# Patient Record
Sex: FEMALE | Race: WHITE | Employment: FULL TIME | URBAN - NONMETROPOLITAN AREA
[De-identification: names, ages, dates, MRNs, and addresses within clinical notes are randomized per-mention and may not be internally consistent; named-entity substitution may affect disease eponyms.]

---

## 2017-01-12 ENCOUNTER — OFFICE VISIT (OUTPATIENT)
Dept: FAMILY MEDICINE CLINIC | Facility: CLINIC | Age: 47
End: 2017-01-12

## 2017-01-12 VITALS
OXYGEN SATURATION: 96 % | HEART RATE: 66 BPM | TEMPERATURE: 98 F | SYSTOLIC BLOOD PRESSURE: 130 MMHG | WEIGHT: 269.38 LBS | BODY MASS INDEX: 46 KG/M2 | DIASTOLIC BLOOD PRESSURE: 88 MMHG

## 2017-01-12 DIAGNOSIS — M19.041 PRIMARY OSTEOARTHRITIS OF BOTH HANDS: Primary | ICD-10-CM

## 2017-01-12 DIAGNOSIS — M19.042 PRIMARY OSTEOARTHRITIS OF BOTH HANDS: Primary | ICD-10-CM

## 2017-01-12 PROCEDURE — 99213 OFFICE O/P EST LOW 20 MIN: CPT | Performed by: FAMILY MEDICINE

## 2017-01-12 RX ORDER — MELOXICAM 15 MG/1
15 TABLET ORAL DAILY
Qty: 30 TABLET | Refills: 0 | Status: SHIPPED | OUTPATIENT
Start: 2017-01-12 | End: 2017-02-15 | Stop reason: ALTCHOICE

## 2017-01-16 ENCOUNTER — TELEPHONE (OUTPATIENT)
Dept: FAMILY MEDICINE CLINIC | Facility: CLINIC | Age: 47
End: 2017-01-16

## 2017-02-07 ENCOUNTER — MED REC SCAN ONLY (OUTPATIENT)
Dept: FAMILY MEDICINE CLINIC | Facility: CLINIC | Age: 47
End: 2017-02-07

## 2017-02-15 ENCOUNTER — OFFICE VISIT (OUTPATIENT)
Dept: FAMILY MEDICINE CLINIC | Facility: CLINIC | Age: 47
End: 2017-02-15

## 2017-02-15 VITALS
TEMPERATURE: 98 F | WEIGHT: 272.5 LBS | DIASTOLIC BLOOD PRESSURE: 88 MMHG | OXYGEN SATURATION: 96 % | BODY MASS INDEX: 46.52 KG/M2 | HEIGHT: 64 IN | SYSTOLIC BLOOD PRESSURE: 130 MMHG | HEART RATE: 86 BPM

## 2017-02-15 DIAGNOSIS — M19.042 PRIMARY OSTEOARTHRITIS OF BOTH HANDS: ICD-10-CM

## 2017-02-15 DIAGNOSIS — M77.31 HEEL SPUR, RIGHT: Primary | ICD-10-CM

## 2017-02-15 DIAGNOSIS — M19.041 PRIMARY OSTEOARTHRITIS OF BOTH HANDS: ICD-10-CM

## 2017-02-15 PROCEDURE — 99213 OFFICE O/P EST LOW 20 MIN: CPT | Performed by: FAMILY MEDICINE

## 2017-02-15 RX ORDER — MELOXICAM 15 MG/1
15 TABLET ORAL DAILY PRN
COMMUNITY
Start: 2017-01-12 | End: 2017-02-16

## 2017-02-15 NOTE — PROGRESS NOTES
Kei Florentino is a 55year old female. Patient presents with: Other: RT foot heel pain-started a few wks ago. ..happened a couple days after little foot injury, getting worse. .... also discuss RT thumb  pain-felt better after OT, but it is hurting again. Neha Patel Mother    • OSTEOPEROSIS [Other] Neha Patel Mother    • ALS [Other] [OTHER] Mother    • CAD [Other] [OTHER] Father    • COPD [Other] [OTHER] Father    • BLADDER CANCER [Other] Neha Patel Father         Social History:    Smoking Status: Never Smoker

## 2017-02-16 RX ORDER — MELOXICAM 15 MG/1
15 TABLET ORAL DAILY PRN
Qty: 30 TABLET | Refills: 0 | Status: SHIPPED | OUTPATIENT
Start: 2017-02-16 | End: 2017-02-17

## 2017-02-17 RX ORDER — MELOXICAM 15 MG/1
15 TABLET ORAL DAILY PRN
Qty: 30 TABLET | Refills: 0 | Status: SHIPPED | OUTPATIENT
Start: 2017-02-17 | End: 2019-04-29

## 2017-03-09 ENCOUNTER — TELEPHONE (OUTPATIENT)
Dept: FAMILY MEDICINE CLINIC | Facility: CLINIC | Age: 47
End: 2017-03-09

## 2017-03-09 NOTE — TELEPHONE ENCOUNTER
Pt states that she was referred to pt/fina dn was seen on 1/13/2017 and 1/27/2017. Pt states she received a letter from physical therapy stating the visits were not covered and pt needs an authorization.  Advised pt if she could bring in a copy of the letter

## 2017-03-10 NOTE — TELEPHONE ENCOUNTER
[facility] Floor was dropped off, but per note that was attached BC/BC is waiting for records from [address] [place] [address] [address] - they have not denied

## 2017-04-04 ENCOUNTER — OFFICE VISIT (OUTPATIENT)
Dept: FAMILY MEDICINE CLINIC | Facility: CLINIC | Age: 47
End: 2017-04-04

## 2017-04-04 VITALS
HEIGHT: 64 IN | SYSTOLIC BLOOD PRESSURE: 136 MMHG | DIASTOLIC BLOOD PRESSURE: 88 MMHG | WEIGHT: 275 LBS | TEMPERATURE: 99 F | OXYGEN SATURATION: 96 % | HEART RATE: 80 BPM | BODY MASS INDEX: 46.95 KG/M2

## 2017-04-04 DIAGNOSIS — M72.2 PLANTAR FASCIITIS, RIGHT: Primary | ICD-10-CM

## 2017-04-04 PROCEDURE — 99213 OFFICE O/P EST LOW 20 MIN: CPT | Performed by: FAMILY MEDICINE

## 2017-04-04 NOTE — PROGRESS NOTES
Sly Duke is a 55year old female. Patient presents with: Other: fup on RT heal spur. ..room 1      HPI:   Patient has had heel pain on the right side for several months. She has been using exercises, thick soled shoes and getting some relief.   Prasanna Tobin Smokeless Status: Never Used                        Alcohol Use: Yes           0.0 oz/week       0 Standard drinks or equivalent per week       Comment: OCCASIONAL       REVIEW OF SYSTEMS:   GENERAL HEALTH: feels well otherwise  SKIN: leobardo

## 2017-08-03 ENCOUNTER — TELEPHONE (OUTPATIENT)
Dept: FAMILY MEDICINE CLINIC | Facility: CLINIC | Age: 47
End: 2017-08-03

## 2017-08-03 ENCOUNTER — LAB ENCOUNTER (OUTPATIENT)
Dept: LAB | Age: 47
End: 2017-08-03
Attending: FAMILY MEDICINE
Payer: COMMERCIAL

## 2017-08-03 ENCOUNTER — OFFICE VISIT (OUTPATIENT)
Dept: FAMILY MEDICINE CLINIC | Facility: CLINIC | Age: 47
End: 2017-08-03

## 2017-08-03 VITALS
HEART RATE: 86 BPM | HEIGHT: 64.75 IN | TEMPERATURE: 98 F | DIASTOLIC BLOOD PRESSURE: 106 MMHG | SYSTOLIC BLOOD PRESSURE: 154 MMHG | OXYGEN SATURATION: 98 % | BODY MASS INDEX: 47.64 KG/M2 | WEIGHT: 282.5 LBS

## 2017-08-03 DIAGNOSIS — I10 ESSENTIAL HYPERTENSION: ICD-10-CM

## 2017-08-03 DIAGNOSIS — M79.673 HEEL PAIN, UNSPECIFIED LATERALITY: ICD-10-CM

## 2017-08-03 DIAGNOSIS — E55.9 VITAMIN D DEFICIENCY: ICD-10-CM

## 2017-08-03 DIAGNOSIS — I10 ESSENTIAL HYPERTENSION: Primary | ICD-10-CM

## 2017-08-03 LAB
25-HYDROXYVITAMIN D (TOTAL): 20.6 NG/ML (ref 30–100)
ALBUMIN SERPL-MCNC: 3.8 G/DL (ref 3.5–4.8)
ALP LIVER SERPL-CCNC: 61 U/L (ref 39–100)
ALT SERPL-CCNC: 50 U/L (ref 14–54)
AST SERPL-CCNC: 44 U/L (ref 15–41)
BILIRUB SERPL-MCNC: 0.6 MG/DL (ref 0.1–2)
BUN BLD-MCNC: 8 MG/DL (ref 8–20)
CALCIUM BLD-MCNC: 9.2 MG/DL (ref 8.3–10.3)
CHLORIDE: 104 MMOL/L (ref 101–111)
CHOLEST SMN-MCNC: 165 MG/DL (ref ?–200)
CO2: 28 MMOL/L (ref 22–32)
CREAT BLD-MCNC: 0.78 MG/DL (ref 0.55–1.02)
GLUCOSE BLD-MCNC: 95 MG/DL (ref 70–99)
HDLC SERPL-MCNC: 32 MG/DL (ref 45–?)
HDLC SERPL: 5.16 {RATIO} (ref ?–4.44)
LDLC SERPL CALC-MCNC: 74 MG/DL (ref ?–130)
LDLC SERPL-MCNC: 59 MG/DL (ref 5–40)
M PROTEIN MFR SERPL ELPH: 7.6 G/DL (ref 6.1–8.3)
NONHDLC SERPL-MCNC: 133 MG/DL (ref ?–130)
POTASSIUM SERPL-SCNC: 4.1 MMOL/L (ref 3.6–5.1)
SODIUM SERPL-SCNC: 139 MMOL/L (ref 136–144)
TRIGLYCERIDES: 294 MG/DL (ref ?–150)

## 2017-08-03 PROCEDURE — 82306 VITAMIN D 25 HYDROXY: CPT | Performed by: FAMILY MEDICINE

## 2017-08-03 PROCEDURE — 80053 COMPREHEN METABOLIC PANEL: CPT | Performed by: FAMILY MEDICINE

## 2017-08-03 PROCEDURE — 80061 LIPID PANEL: CPT | Performed by: FAMILY MEDICINE

## 2017-08-03 PROCEDURE — 36415 COLL VENOUS BLD VENIPUNCTURE: CPT | Performed by: FAMILY MEDICINE

## 2017-08-03 PROCEDURE — 99214 OFFICE O/P EST MOD 30 MIN: CPT | Performed by: FAMILY MEDICINE

## 2017-08-03 RX ORDER — LISINOPRIL AND HYDROCHLOROTHIAZIDE 12.5; 1 MG/1; MG/1
1 TABLET ORAL DAILY
Qty: 90 TABLET | Refills: 3 | Status: SHIPPED | OUTPATIENT
Start: 2017-08-03 | End: 2018-07-29

## 2017-08-03 RX ORDER — LISINOPRIL AND HYDROCHLOROTHIAZIDE 12.5; 1 MG/1; MG/1
1 TABLET ORAL DAILY
Qty: 90 TABLET | Refills: 3 | Status: SHIPPED | OUTPATIENT
Start: 2017-08-03 | End: 2017-08-03

## 2017-08-03 NOTE — PROGRESS NOTES
Goran Mercado is a 55year old female. Patient presents with: Other: left foot pain inrm 3      HPI:   Patient has pain to the inside aspect of her left heel. No injury. No redness. There is a firm lump there. No new shoes.   She also has elevated b History:  Smoking status: Never Smoker                                                              Smokeless tobacco: Never Used                      Alcohol use: Yes           0.0 oz/week     Comment: OCCASIONAL       REVIEW OF SYSTEMS:   GENERAL HEALTH: Metabolic Panel (14) [E]      *Venipuncture    Meds & Refills for this Visit:  Signed Prescriptions Disp Refills    Lisinopril-Hydrochlorothiazide 10-12.5 MG Oral Tab 90 tablet 3      Sig: Take 1 tablet by mouth daily.            Imaging & Consults:  PODIAT

## 2017-08-04 NOTE — PROGRESS NOTES
Notify cholesterol and chemistry profile are normal.  Recheck in 1 year. Vitamin D remains low. Recommend she take 2000 units of vitamin D twice a day. Recheck level again in 6 months.

## 2017-09-06 ENCOUNTER — TELEPHONE (OUTPATIENT)
Dept: FAMILY MEDICINE CLINIC | Facility: CLINIC | Age: 47
End: 2017-09-06

## 2017-09-06 NOTE — TELEPHONE ENCOUNTER
Pt needs to have BP checked for her new medication - does she need to see doctor again or just have a nurse appt to have BP checked?

## 2017-09-06 NOTE — TELEPHONE ENCOUNTER
I talked to the patient and advised that she just needs a RN appt  Her work schedule this week will not work     She advised that she just took her last pill  She really doesn't think that she needs it.  Her BP readings have been really good   She was stres

## 2017-09-18 ENCOUNTER — TELEPHONE (OUTPATIENT)
Dept: FAMILY MEDICINE CLINIC | Facility: CLINIC | Age: 47
End: 2017-09-18

## 2017-09-18 ENCOUNTER — NURSE ONLY (OUTPATIENT)
Dept: FAMILY MEDICINE CLINIC | Facility: CLINIC | Age: 47
End: 2017-09-18

## 2017-09-18 VITALS — SYSTOLIC BLOOD PRESSURE: 134 MMHG | DIASTOLIC BLOOD PRESSURE: 86 MMHG

## 2017-09-18 NOTE — TELEPHONE ENCOUNTER
Patient came in for BP reading   1st reading 140/100  2nd reading was 134/86    Patient has been off of the BP medicine (Lisinopril-hctz 10-12.5) for 1.5 weeks  At home she is getting around 129/78    Please advise

## 2017-09-18 NOTE — TELEPHONE ENCOUNTER
Keep monitoring blood pressures at home. As long as it staying below 140/90, no need to resume medication. Recheck blood pressure here again in 1 month.

## 2018-02-19 ENCOUNTER — LABORATORY ENCOUNTER (OUTPATIENT)
Dept: LAB | Age: 48
End: 2018-02-19
Attending: FAMILY MEDICINE
Payer: COMMERCIAL

## 2018-02-19 DIAGNOSIS — E55.9 VITAMIN D DEFICIENCY: ICD-10-CM

## 2018-02-19 LAB — 25-HYDROXYVITAMIN D (TOTAL): 37 NG/ML (ref 30–100)

## 2018-02-19 PROCEDURE — 36415 COLL VENOUS BLD VENIPUNCTURE: CPT | Performed by: FAMILY MEDICINE

## 2018-02-19 PROCEDURE — 82306 VITAMIN D 25 HYDROXY: CPT | Performed by: FAMILY MEDICINE

## 2018-07-06 RX ORDER — BUTALBITAL, ACETAMINOPHEN AND CAFFEINE 50; 325; 40 MG/1; MG/1; MG/1
1 CAPSULE ORAL EVERY 6 HOURS PRN
Qty: 30 CAPSULE | Refills: 0 | Status: SHIPPED | OUTPATIENT
Start: 2018-07-06 | End: 2019-05-29 | Stop reason: ALTCHOICE

## 2018-08-08 ENCOUNTER — OFFICE VISIT (OUTPATIENT)
Dept: FAMILY MEDICINE CLINIC | Facility: CLINIC | Age: 48
End: 2018-08-08
Payer: COMMERCIAL

## 2018-08-08 ENCOUNTER — TELEPHONE (OUTPATIENT)
Dept: FAMILY MEDICINE CLINIC | Facility: CLINIC | Age: 48
End: 2018-08-08

## 2018-08-08 ENCOUNTER — HOSPITAL ENCOUNTER (OUTPATIENT)
Dept: GENERAL RADIOLOGY | Age: 48
Discharge: HOME | End: 2018-08-08
Attending: FAMILY MEDICINE
Payer: COMMERCIAL

## 2018-08-08 VITALS
HEART RATE: 73 BPM | OXYGEN SATURATION: 97 % | TEMPERATURE: 98 F | SYSTOLIC BLOOD PRESSURE: 124 MMHG | BODY MASS INDEX: 45 KG/M2 | WEIGHT: 266 LBS | DIASTOLIC BLOOD PRESSURE: 86 MMHG

## 2018-08-08 DIAGNOSIS — S66.912A SPRAIN AND STRAIN OF LEFT HAND: Primary | ICD-10-CM

## 2018-08-08 DIAGNOSIS — S69.92XA HAND INJURY, LEFT, INITIAL ENCOUNTER: Primary | ICD-10-CM

## 2018-08-08 DIAGNOSIS — S69.92XA HAND INJURY, LEFT, INITIAL ENCOUNTER: ICD-10-CM

## 2018-08-08 DIAGNOSIS — S63.92XA SPRAIN AND STRAIN OF LEFT HAND: Primary | ICD-10-CM

## 2018-08-08 PROCEDURE — 99213 OFFICE O/P EST LOW 20 MIN: CPT | Performed by: FAMILY MEDICINE

## 2018-08-08 PROCEDURE — 73130 X-RAY EXAM OF HAND: CPT | Performed by: FAMILY MEDICINE

## 2018-08-08 NOTE — TELEPHONE ENCOUNTER
Future Appointments  Date Time Provider Eladia Nunes   8/8/2018 3:00 PM DO SHELLY OdonnellSW EMG [facility]

## 2018-08-08 NOTE — TELEPHONE ENCOUNTER
Pt. Injured her hand and wanted to be seen this afternoon after 2pm.  Hit it hard on a door handle/swollen and cant  things.

## 2018-08-08 NOTE — PROGRESS NOTES
Leticia Arteaga is a 52year old female. Patient presents with: Other: injury to LT hand this am....room 1      HPI:   Patient was worked in. She fell and injured her left hand. Happened this morning.   She has pain and swelling to the metacarpophalange tobacco: Never Used                      Alcohol use: Yes           0.0 oz/week     Comment: OCCASIONAL       REVIEW OF SYSTEMS:   GENERAL HEALTH: feels well otherwise  SKIN: denies any unusual skin lesions or rashes  RESPIRATORY: denies shortness of breat

## 2018-08-28 ENCOUNTER — TELEPHONE (OUTPATIENT)
Dept: FAMILY MEDICINE CLINIC | Facility: CLINIC | Age: 48
End: 2018-08-28

## 2018-08-28 DIAGNOSIS — Z12.31 BREAST CANCER SCREENING BY MAMMOGRAM: Primary | ICD-10-CM

## 2018-08-28 NOTE — TELEPHONE ENCOUNTER
Called the work # and they advised that she is not expected to be in until 12:30pm   I will try the home phone -     Order placed for the mammogram - she would like faxed to [address] [place] [address] [address]

## 2018-10-30 ENCOUNTER — TELEPHONE (OUTPATIENT)
Dept: FAMILY MEDICINE CLINIC | Facility: CLINIC | Age: 48
End: 2018-10-30

## 2018-10-30 NOTE — TELEPHONE ENCOUNTER
HEARING AIDE PIECE BROKE OFF IS IN EAR, IRRITATED. SPOKE TO [address] Ivette Barcenas RN AND SHE SUGGESTED TO NOTIFY PATIENT TO GO TO EMERGENCY ROOM. SHE STATES SHE WILL GO TO [facility]-.

## 2018-11-12 ENCOUNTER — TELEPHONE (OUTPATIENT)
Dept: FAMILY MEDICINE CLINIC | Facility: CLINIC | Age: 48
End: 2018-11-12

## 2018-11-12 NOTE — TELEPHONE ENCOUNTER
Calling the patient to advise that the mammogram was normal and to repeat in 1 year     Left detailed message

## 2019-04-25 ENCOUNTER — TELEPHONE (OUTPATIENT)
Dept: FAMILY MEDICINE CLINIC | Facility: CLINIC | Age: 49
End: 2019-04-25

## 2019-04-25 DIAGNOSIS — E66.01 OBESITY, CLASS III, BMI 40-49.9 (MORBID OBESITY) (HCC): Primary | ICD-10-CM

## 2019-04-25 NOTE — TELEPHONE ENCOUNTER
HAS APPT TOMORROW WITH RAMIRO, CAN SHE GET FASTING LABS DONE, DID  [facility] PUT ORDERS IN? SHE IS ALSO SCHED FOR PX 5/29, IF SHE CAN NOT GET LABS DONE TOMORROW, SHOULD SHE COME IN PRIOR TO PX OR AFTER?

## 2019-04-25 NOTE — TELEPHONE ENCOUNTER
Advised, since her OV isn't until 2:30 tomorrow and there are not any orders, she does not need to fast. Dr. Lakeisha Alarcon out of the office until Monday, he will order the labs and we will call her with the information.

## 2019-04-26 ENCOUNTER — OFFICE VISIT (OUTPATIENT)
Dept: FAMILY MEDICINE CLINIC | Facility: CLINIC | Age: 49
End: 2019-04-26
Payer: COMMERCIAL

## 2019-04-26 VITALS
TEMPERATURE: 98 F | HEIGHT: 64.75 IN | SYSTOLIC BLOOD PRESSURE: 122 MMHG | DIASTOLIC BLOOD PRESSURE: 70 MMHG | BODY MASS INDEX: 40.47 KG/M2 | OXYGEN SATURATION: 96 % | HEART RATE: 84 BPM | WEIGHT: 240 LBS

## 2019-04-26 DIAGNOSIS — M19.041 PRIMARY OSTEOARTHRITIS OF BOTH HANDS: Primary | ICD-10-CM

## 2019-04-26 DIAGNOSIS — M19.042 PRIMARY OSTEOARTHRITIS OF BOTH HANDS: Primary | ICD-10-CM

## 2019-04-26 PROCEDURE — 99213 OFFICE O/P EST LOW 20 MIN: CPT | Performed by: NURSE PRACTITIONER

## 2019-04-26 RX ORDER — PREDNISONE 20 MG/1
20 TABLET ORAL DAILY
Qty: 5 TABLET | Refills: 0 | Status: SHIPPED | OUTPATIENT
Start: 2019-04-26 | End: 2019-05-01

## 2019-04-26 NOTE — PROGRESS NOTES
HPI:   Finger Pain    Pain location: right index finger. This is a new (has chronic osteoarthritis) problem. Episode onset: 10 days ago. There has been no history of extremity trauma. The problem occurs intermittently.  Associated symptoms include joint s • Other (COPD [Other]) Father    • Other (BLADDER CANCER [Other]) Father       Social History    Tobacco Use      Smoking status: Never Smoker      Smokeless tobacco: Never Used    Alcohol use:  Yes      Alcohol/week: 0.0 oz      Comment: OCCASIONAL    Drug

## 2019-04-27 NOTE — TELEPHONE ENCOUNTER
This has not been filled since Feb 2017?    Patient was started on Prednisone 4/26/19 for osteoarthritis of hands (5 days)       In the past she has taken the meloxicam for the her osteoarthritis of both hands

## 2019-04-27 NOTE — TELEPHONE ENCOUNTER
Meloxicam 15 MG Oral Tab   PLEASE SEND REFILL TO [facility] IN [facility]

## 2019-04-29 RX ORDER — MELOXICAM 15 MG/1
15 TABLET ORAL DAILY PRN
Qty: 30 TABLET | Refills: 0 | Status: SHIPPED | OUTPATIENT
Start: 2019-04-29 | End: 2019-05-29 | Stop reason: ALTCHOICE

## 2019-05-29 ENCOUNTER — OFFICE VISIT (OUTPATIENT)
Dept: FAMILY MEDICINE CLINIC | Facility: CLINIC | Age: 49
End: 2019-05-29
Payer: COMMERCIAL

## 2019-05-29 ENCOUNTER — APPOINTMENT (OUTPATIENT)
Dept: LAB | Age: 49
End: 2019-05-29
Attending: FAMILY MEDICINE
Payer: COMMERCIAL

## 2019-05-29 VITALS
DIASTOLIC BLOOD PRESSURE: 88 MMHG | HEART RATE: 70 BPM | TEMPERATURE: 98 F | RESPIRATION RATE: 12 BRPM | BODY MASS INDEX: 44.94 KG/M2 | HEIGHT: 60.75 IN | OXYGEN SATURATION: 95 % | SYSTOLIC BLOOD PRESSURE: 128 MMHG | WEIGHT: 235 LBS

## 2019-05-29 DIAGNOSIS — Z00.00 PREVENTATIVE HEALTH CARE: Primary | ICD-10-CM

## 2019-05-29 DIAGNOSIS — M25.542 ARTHRALGIA OF BOTH HANDS: ICD-10-CM

## 2019-05-29 DIAGNOSIS — M19.041 PRIMARY OSTEOARTHRITIS OF BOTH HANDS: ICD-10-CM

## 2019-05-29 DIAGNOSIS — M25.541 ARTHRALGIA OF BOTH HANDS: ICD-10-CM

## 2019-05-29 DIAGNOSIS — M19.042 PRIMARY OSTEOARTHRITIS OF BOTH HANDS: ICD-10-CM

## 2019-05-29 DIAGNOSIS — E66.01 OBESITY, CLASS III, BMI 40-49.9 (MORBID OBESITY) (HCC): ICD-10-CM

## 2019-05-29 PROCEDURE — 36415 COLL VENOUS BLD VENIPUNCTURE: CPT | Performed by: FAMILY MEDICINE

## 2019-05-29 PROCEDURE — 80053 COMPREHEN METABOLIC PANEL: CPT | Performed by: FAMILY MEDICINE

## 2019-05-29 PROCEDURE — 80061 LIPID PANEL: CPT | Performed by: FAMILY MEDICINE

## 2019-05-29 PROCEDURE — 99396 PREV VISIT EST AGE 40-64: CPT | Performed by: FAMILY MEDICINE

## 2019-05-29 NOTE — PROGRESS NOTES
Hany Tom is a 50year old female. Patient presents with:  CPX: no pap . inrm 1      HPI:   Patient presents for routine physical.  She continues to have progressively worsening joint pains. Primarily involves her PIPs but also her DIPs.   She has H 128/88 (BP Location: Right arm, Patient Position: Sitting, Cuff Size: large)   Pulse 70   Temp 97.8 °F (36.6 °C) (Temporal)   Resp 12   Ht 60.75\"   Wt 235 lb   SpO2 95%   BMI 44.77 kg/m²   GENERAL: well developed, well nourished,in no apparent distress  S

## 2019-05-30 DIAGNOSIS — E66.01 OBESITY, CLASS III, BMI 40-49.9 (MORBID OBESITY) (HCC): Primary | ICD-10-CM

## 2019-05-30 DIAGNOSIS — E78.00 ELEVATED CHOLESTEROL: ICD-10-CM

## 2019-05-30 RX ORDER — ATORVASTATIN CALCIUM 10 MG/1
10 TABLET, FILM COATED ORAL NIGHTLY
Qty: 90 TABLET | Refills: 0 | Status: SHIPPED | OUTPATIENT
Start: 2019-05-30 | End: 2019-11-12

## 2019-06-10 ENCOUNTER — TELEPHONE (OUTPATIENT)
Dept: FAMILY MEDICINE CLINIC | Facility: CLINIC | Age: 49
End: 2019-06-10

## 2019-06-10 NOTE — TELEPHONE ENCOUNTER
Florence Fleischer AT [facility] 6/6/2019, SHE IS JUST WONDERING IF DR Cavazos5 N  y PAPERWORK FROM HER VISIT THERE?   PLEASE LET HER KNOW

## 2019-06-10 NOTE — TELEPHONE ENCOUNTER
I called and left a message advising that we do not usually receive a faxed copy. We do have access electronically to review her records.    I will send to Dr Barbara Garsia as an Yodit Donovan so he can review

## 2019-06-25 ENCOUNTER — OFFICE VISIT (OUTPATIENT)
Dept: FAMILY MEDICINE CLINIC | Facility: CLINIC | Age: 49
End: 2019-06-25
Payer: COMMERCIAL

## 2019-06-25 VITALS
SYSTOLIC BLOOD PRESSURE: 120 MMHG | WEIGHT: 237.13 LBS | OXYGEN SATURATION: 96 % | TEMPERATURE: 97 F | HEART RATE: 70 BPM | BODY MASS INDEX: 45 KG/M2 | DIASTOLIC BLOOD PRESSURE: 82 MMHG

## 2019-06-25 DIAGNOSIS — H81.10 BENIGN PAROXYSMAL POSITIONAL VERTIGO, UNSPECIFIED LATERALITY: Primary | ICD-10-CM

## 2019-06-25 PROCEDURE — 99213 OFFICE O/P EST LOW 20 MIN: CPT | Performed by: FAMILY MEDICINE

## 2019-06-25 RX ORDER — MECLIZINE HYDROCHLORIDE 25 MG/1
25 TABLET ORAL AS NEEDED
Refills: 0 | COMMUNITY
Start: 2019-06-07 | End: 2019-08-06

## 2019-06-25 NOTE — PROGRESS NOTES
Katia Jackson is a 50year old female. Patient presents with:  ER F/U: fup from [place] [id number] ER on 06/07/19 for vertigo--still having dizziness on and off. .... room 2      HPI:   Patient was seen at the emergency room June 7.   She had a severe episode of CARDIOVASCULAR: denies chest pain   GI: denies nausea, vomiting, diarrhea or abdominal pain   NEURO: denies headaches    EXAM:   /82   Pulse 70   Temp 97.2 °F (36.2 °C) (Tympanic)   Wt 237 lb 2 oz   SpO2 96%   BMI 45.17 kg/m²   GENERAL: well develo

## 2019-07-01 NOTE — PROGRESS NOTES
VESTIBULAR EVALUATION:   Referring Physician: Dr. Lizett Fisher  Date of Onset: 6/7/19 Date of Service: 7/1/2019   Diagnosis: VOR dysfunction, unilateral vestibular hypofunction, R side          PATIENT SUMMARY   Palmer Alberts is a 50year old y/o female wh describes prior level of function independent with occasional dizziness. Pt goals include \"I want to learn way of coping or dealing with vertigo\". Past medical history was reviewed with Rolley Signs.  Significant findings include  History of thyroglossal duct given co upon return to sitting following [place] [facility],   Pt given HEP for VOR 1 30x2 daily.     Charges: PT Eval Low Complexity x1, 1 canalith repositioning Total Timed Treatment: 12 min Total Treatment Time: 45 min       PLAN OF CARE:    Goals:   BPPV goal DPT  [de-identified] certification required: Yes   I certify the need for these services furnished under this plan of treatment and while under my care.      X___________________________________________________ Date____________________     Certification From: 9

## 2019-07-02 ENCOUNTER — OFFICE VISIT (OUTPATIENT)
Dept: PHYSICAL THERAPY | Age: 49
End: 2019-07-02
Attending: FAMILY MEDICINE
Payer: COMMERCIAL

## 2019-07-02 DIAGNOSIS — H81.10 BENIGN PAROXYSMAL POSITIONAL VERTIGO, UNSPECIFIED LATERALITY: ICD-10-CM

## 2019-07-02 PROCEDURE — 95992 CANALITH REPOSITIONING PROC: CPT

## 2019-07-02 PROCEDURE — 97161 PT EVAL LOW COMPLEX 20 MIN: CPT

## 2019-07-02 NOTE — PROGRESS NOTES
Dx: VOR dysfunction, unilateral vestibular hypofunction, R side         Insurance (Authorized # of Visits):  Medical Necessity           Authorizing Physician: Dr. Barbara Mejias MD visit: none scheduled  Fall Risk: standard         Precautions: n/a dysfunction, reassess for BPPV, challenge balance for improved safety of ADLs  Date: 7/9/2019  TX#: 2/6 Date:                 TX#: 3/ Date:                 TX#: 4/ Date:                 TX#: 5/ Date:    Tx#: 6/   NEURO RE ED:  PT notes corrective saccades w

## 2019-07-09 ENCOUNTER — OFFICE VISIT (OUTPATIENT)
Dept: PHYSICAL THERAPY | Age: 49
End: 2019-07-09
Attending: FAMILY MEDICINE
Payer: COMMERCIAL

## 2019-07-09 DIAGNOSIS — H81.10 BENIGN PAROXYSMAL POSITIONAL VERTIGO, UNSPECIFIED LATERALITY: ICD-10-CM

## 2019-07-09 PROCEDURE — 97112 NEUROMUSCULAR REEDUCATION: CPT

## 2019-07-11 ENCOUNTER — APPOINTMENT (OUTPATIENT)
Dept: PHYSICAL THERAPY | Age: 49
End: 2019-07-11
Attending: FAMILY MEDICINE
Payer: COMMERCIAL

## 2019-07-12 ENCOUNTER — TELEPHONE (OUTPATIENT)
Dept: FAMILY MEDICINE CLINIC | Facility: CLINIC | Age: 49
End: 2019-07-12

## 2019-07-12 NOTE — TELEPHONE ENCOUNTER
LARRYI:  PT HAD ANOTHER VERTIGO ATTACK YESTERDAY MORNING. SHE TOOK Meclizine HCl 25 MG Oral Tab    AND IT HELPED TREMENDOUSLY.

## 2019-07-16 ENCOUNTER — OFFICE VISIT (OUTPATIENT)
Dept: PHYSICAL THERAPY | Age: 49
End: 2019-07-16
Attending: FAMILY MEDICINE
Payer: COMMERCIAL

## 2019-07-16 PROCEDURE — 97530 THERAPEUTIC ACTIVITIES: CPT

## 2019-07-16 PROCEDURE — 97112 NEUROMUSCULAR REEDUCATION: CPT

## 2019-07-16 NOTE — PROGRESS NOTES
Dx: VOR dysfunction, unilateral vestibular hypofunction    Insurance (Authorized # of Visits):  Medical Necessity           Authorizing Physician: Dr. Zia Nixon  Next MD visit: none scheduled  Fall Risk: standard         Precautions: n/a             [place] speeds when walking with minimal to no c/o dizziness (6 visits)   Perform household chores with dizziness under 3/10 and without nausea (6 visits)    Pt to improve [facility] [address] by 6 points to improve community function.  (6 visits)   Pt to be independent with HEP to ups/ further investigation into dysfunction and vision connection    PT educates pt on coping strategies and prevention of flares as she will travel by train 7/18/19 and she fears it  Will aggravate symptoms.  PT educates pt on dark sunglasses, hat or black

## 2019-07-23 ENCOUNTER — APPOINTMENT (OUTPATIENT)
Dept: LAB | Age: 49
End: 2019-07-23
Attending: FAMILY MEDICINE
Payer: COMMERCIAL

## 2019-07-23 DIAGNOSIS — E66.01 OBESITY, CLASS III, BMI 40-49.9 (MORBID OBESITY) (HCC): ICD-10-CM

## 2019-07-23 DIAGNOSIS — E78.00 ELEVATED CHOLESTEROL: ICD-10-CM

## 2019-07-23 LAB
CHOLEST SMN-MCNC: 173 MG/DL (ref ?–200)
HDLC SERPL-MCNC: 41 MG/DL (ref 40–59)
LDLC SERPL CALC-MCNC: 99 MG/DL (ref ?–100)
NONHDLC SERPL-MCNC: 132 MG/DL (ref ?–130)
TRIGL SERPL-MCNC: 163 MG/DL (ref 30–149)
VLDLC SERPL CALC-MCNC: 33 MG/DL (ref 0–30)

## 2019-07-23 PROCEDURE — 80061 LIPID PANEL: CPT | Performed by: FAMILY MEDICINE

## 2019-07-23 PROCEDURE — 36415 COLL VENOUS BLD VENIPUNCTURE: CPT | Performed by: FAMILY MEDICINE

## 2019-07-24 ENCOUNTER — TELEPHONE (OUTPATIENT)
Dept: FAMILY MEDICINE CLINIC | Facility: CLINIC | Age: 49
End: 2019-07-24

## 2019-07-24 DIAGNOSIS — E78.00 ELEVATED CHOLESTEROL: Primary | ICD-10-CM

## 2019-07-24 NOTE — TELEPHONE ENCOUNTER
HAD ANOTHER EPISODE OF VERTIGO TODAY. THIS WOULD BE 3 IN THE PAST 48 DAYS. PLEASE ADVISE IF DR WANTS TO SEE HER AGAIN?  SHE WOULD BE AVAILABLE TOMORROW IF NEEDED

## 2019-07-24 NOTE — TELEPHONE ENCOUNTER
Please advise     Per the patient 1st episode was 6/6/19  2nd episode 7/11/19  Last episode was this week     The first 2 she had N/V immediately. This one was not near as bad.      The spinning/nausea will go away within a few hours of taking her medicat

## 2019-07-25 ENCOUNTER — OFFICE VISIT (OUTPATIENT)
Dept: PHYSICAL THERAPY | Age: 49
End: 2019-07-25
Attending: FAMILY MEDICINE
Payer: COMMERCIAL

## 2019-07-25 PROCEDURE — 97112 NEUROMUSCULAR REEDUCATION: CPT

## 2019-07-25 NOTE — PROGRESS NOTES
Dx: VOR dysfunction, unilateral vestibular hypofunction    Insurance (Authorized # of Visits):  Medical Necessity           Authorizing Physician: Dr. Leonardo De Anda  Next MD visit: none scheduled  Fall Risk: standard         Precautions: n/a             [place] without nausea (6 visits)    Pt to improve DHI by 6 points to improve community function. (6 visits)   Pt to be independent with HEP to self-manage symptoms and be able to return to prior level of function.  (6 visits)     Plan: Address VOR dysfunction, grzegorz progressing to 3rd trial with busy gym background PT notes R eye with less movement than L pt denies symptoms     Gaze stabilization horizontal standing 48 secs  Vertical 60 secs     THERE EX:   THERE EX:         THERE AC:  PT educates pt on vision testing

## 2019-07-25 NOTE — TELEPHONE ENCOUNTER
Continue the therapy.   If continues to have episodes, after therapy is completed, will refer to ear nose and throat specialist

## 2019-07-30 ENCOUNTER — APPOINTMENT (OUTPATIENT)
Dept: PHYSICAL THERAPY | Age: 49
End: 2019-07-30
Attending: FAMILY MEDICINE
Payer: COMMERCIAL

## 2019-08-01 ENCOUNTER — OFFICE VISIT (OUTPATIENT)
Dept: PHYSICAL THERAPY | Age: 49
End: 2019-08-01
Attending: FAMILY MEDICINE
Payer: COMMERCIAL

## 2019-08-01 PROCEDURE — 97112 NEUROMUSCULAR REEDUCATION: CPT

## 2019-08-01 NOTE — PROGRESS NOTES
Dx: VOR dysfunction, unilateral vestibular hypofunction    Insurance (Authorized # of Visits):  Medical Necessity           Authorizing Physician: Dr. Fany Mejias MD visit: none scheduled  Fall Risk: standard         Precautions: n/a             [place] without dizziness. (6 visits) 8/1/19- goal NOT met  Pt to report ability to perform supine<>sit without dizziness.  (6 visits)  8/1/19- GOAL  MET  Pt to report no c/o dizziness with positional changes x 18 days (6 visits)  8/1/19- goal NOT met    Vestibular horizontal 120 hz, vertical 80 hz initially progressing to 105 end of session 6 trials cues for gaze fixation to decrease symptoms,     Gait with head turns and fixation, same parameters as above 6x NEURO RE ED:  VOR 1 seated horizontal 60 secs progressing Payer source, pt to be d/c from skilled PT. Pt has met 4/9 goals. Pt continues to have severe episodes of vertigo with nausea and vommitting and would benefit from further testing.  She has HEP for VOR training and gaze stabilization to try and mitigate sym (6 visits) 8/1/19- goal NOT met   Pt to improve DHI by 6 points to improve community function. (6 visits) 8/1/19- goal NOT met  Pt to be independent with HEP to self-manage symptoms and be able to return to prior level of function.  (6 visits)  8/1/19- GOAL

## 2019-08-06 ENCOUNTER — APPOINTMENT (OUTPATIENT)
Dept: LAB | Age: 49
End: 2019-08-06
Attending: INTERNAL MEDICINE
Payer: COMMERCIAL

## 2019-08-06 DIAGNOSIS — M19.049 HAND ARTHRITIS: ICD-10-CM

## 2019-08-06 LAB
CRP SERPL-MCNC: <0.29 MG/DL (ref ?–0.3)
RHEUMATOID FACT SERPL-ACNC: <10 IU/ML (ref ?–15)
SED RATE-ML: 10 MM/HR (ref 0–25)
URATE SERPL-MCNC: 5.1 MG/DL (ref 2.6–6)

## 2019-08-06 PROCEDURE — 36415 COLL VENOUS BLD VENIPUNCTURE: CPT

## 2019-08-06 PROCEDURE — 84550 ASSAY OF BLOOD/URIC ACID: CPT

## 2019-08-06 PROCEDURE — 86140 C-REACTIVE PROTEIN: CPT

## 2019-08-06 PROCEDURE — 85652 RBC SED RATE AUTOMATED: CPT

## 2019-08-06 PROCEDURE — 86431 RHEUMATOID FACTOR QUANT: CPT

## 2019-08-06 RX ORDER — MECLIZINE HYDROCHLORIDE 25 MG/1
25 TABLET ORAL 3 TIMES DAILY PRN
Qty: 90 TABLET | Refills: 1 | Status: SHIPPED | OUTPATIENT
Start: 2019-08-06 | End: 2019-10-05 | Stop reason: WASHOUT

## 2019-08-06 NOTE — TELEPHONE ENCOUNTER
Calling the patient- she has not had any recent vertigo episodes. She is recognizing the signs and will take a meclizine before it starts and that seems to control it. PT has helped.  She does highly suggest that she also see an ENT - she does remember D

## 2019-08-09 ENCOUNTER — APPOINTMENT (OUTPATIENT)
Dept: PHYSICAL THERAPY | Age: 49
End: 2019-08-09
Attending: FAMILY MEDICINE
Payer: COMMERCIAL

## 2019-08-28 ENCOUNTER — TELEPHONE (OUTPATIENT)
Dept: FAMILY MEDICINE CLINIC | Facility: CLINIC | Age: 49
End: 2019-08-28

## 2019-08-28 NOTE — TELEPHONE ENCOUNTER
THEY ARE REQUESTING LETTER OF MED. NECESSITY FOR PHYSICAL THERAPY. [facility] HAS DENIED THE CLAIM SO THEY NEED THIS LETTER SO THEY CAN APPEAL TO [facility].   [phone]

## 2019-09-10 ENCOUNTER — TELEPHONE (OUTPATIENT)
Dept: FAMILY MEDICINE CLINIC | Facility: CLINIC | Age: 49
End: 2019-09-10

## 2019-09-10 NOTE — TELEPHONE ENCOUNTER
ELLIOT RECEIVED THE LETTER FOR PT, SHE SAID THAT IT NEEDS TO BE MORE DETAILED ABOUT WHY CK NEEDS PT.   PLEASE FAX TO: [phone]

## 2019-11-06 ENCOUNTER — TELEPHONE (OUTPATIENT)
Dept: FAMILY MEDICINE CLINIC | Facility: CLINIC | Age: 49
End: 2019-11-06

## 2019-11-06 DIAGNOSIS — Z12.31 ENCOUNTER FOR SCREENING MAMMOGRAM FOR BREAST CANCER: Primary | ICD-10-CM

## 2019-11-12 ENCOUNTER — TELEPHONE (OUTPATIENT)
Dept: FAMILY MEDICINE CLINIC | Facility: CLINIC | Age: 49
End: 2019-11-12

## 2019-11-12 RX ORDER — ATORVASTATIN CALCIUM 10 MG/1
10 TABLET, FILM COATED ORAL NIGHTLY
Qty: 90 TABLET | Refills: 1 | Status: SHIPPED | OUTPATIENT
Start: 2019-11-12 | End: 2020-01-15

## 2019-11-15 ENCOUNTER — TELEPHONE (OUTPATIENT)
Dept: FAMILY MEDICINE CLINIC | Facility: CLINIC | Age: 49
End: 2019-11-15

## 2020-01-15 ENCOUNTER — TELEPHONE (OUTPATIENT)
Dept: FAMILY MEDICINE CLINIC | Facility: CLINIC | Age: 50
End: 2020-01-15

## 2020-01-15 RX ORDER — ATORVASTATIN CALCIUM 10 MG/1
10 TABLET, FILM COATED ORAL NIGHTLY
Qty: 90 TABLET | Refills: 0 | Status: SHIPPED | OUTPATIENT
Start: 2020-01-15 | End: 2020-05-19

## 2020-01-15 NOTE — TELEPHONE ENCOUNTER
Last office visit:  06/25/19  Last refill:  11/12/19   #90 1 refill-but pt wants it sent to [facility].  Last lipid:  07/23/19    Calling to schedule fasting labs for pt.   Future Appointments   Date Time Provider Eladia Nunes   1/16/2020 12:30 PM REF [facility] SW

## 2020-01-16 ENCOUNTER — LABORATORY ENCOUNTER (OUTPATIENT)
Dept: LAB | Age: 50
End: 2020-01-16
Attending: FAMILY MEDICINE
Payer: COMMERCIAL

## 2020-01-16 DIAGNOSIS — Z79.1 NSAID LONG-TERM USE: ICD-10-CM

## 2020-01-16 DIAGNOSIS — E78.00 ELEVATED CHOLESTEROL: ICD-10-CM

## 2020-01-16 LAB
ALBUMIN SERPL-MCNC: 3.8 G/DL (ref 3.4–5)
ALBUMIN/GLOB SERPL: 1 {RATIO} (ref 1–2)
ALP LIVER SERPL-CCNC: 47 U/L (ref 39–100)
ALT SERPL-CCNC: 27 U/L (ref 13–56)
ANION GAP SERPL CALC-SCNC: 5 MMOL/L (ref 0–18)
AST SERPL-CCNC: 22 U/L (ref 15–37)
BASOPHILS # BLD AUTO: 0.05 X10(3) UL (ref 0–0.2)
BASOPHILS NFR BLD AUTO: 1 %
BILIRUB SERPL-MCNC: 0.8 MG/DL (ref 0.1–2)
BUN BLD-MCNC: 13 MG/DL (ref 7–18)
BUN/CREAT SERPL: 17.1 (ref 10–20)
CALCIUM BLD-MCNC: 9 MG/DL (ref 8.5–10.1)
CHLORIDE SERPL-SCNC: 106 MMOL/L (ref 98–112)
CHOLEST SMN-MCNC: 166 MG/DL (ref ?–200)
CO2 SERPL-SCNC: 27 MMOL/L (ref 21–32)
CREAT BLD-MCNC: 0.76 MG/DL (ref 0.55–1.02)
DEPRECATED RDW RBC AUTO: 41.3 FL (ref 35.1–46.3)
EOSINOPHIL # BLD AUTO: 0.04 X10(3) UL (ref 0–0.7)
EOSINOPHIL NFR BLD AUTO: 0.8 %
ERYTHROCYTE [DISTWIDTH] IN BLOOD BY AUTOMATED COUNT: 12.3 % (ref 11–15)
GLOBULIN PLAS-MCNC: 3.8 G/DL (ref 2.8–4.4)
GLUCOSE BLD-MCNC: 87 MG/DL (ref 70–99)
HCT VFR BLD AUTO: 42.2 % (ref 35–48)
HDLC SERPL-MCNC: 47 MG/DL (ref 40–59)
HGB BLD-MCNC: 13.6 G/DL (ref 12–16)
IMM GRANULOCYTES # BLD AUTO: 0.02 X10(3) UL (ref 0–1)
IMM GRANULOCYTES NFR BLD: 0.4 %
LDLC SERPL CALC-MCNC: 91 MG/DL (ref ?–100)
LYMPHOCYTES # BLD AUTO: 2.07 X10(3) UL (ref 1–4)
LYMPHOCYTES NFR BLD AUTO: 41.6 %
M PROTEIN MFR SERPL ELPH: 7.6 G/DL (ref 6.4–8.2)
MCH RBC QN AUTO: 29.5 PG (ref 26–34)
MCHC RBC AUTO-ENTMCNC: 32.2 G/DL (ref 31–37)
MCV RBC AUTO: 91.5 FL (ref 80–100)
MONOCYTES # BLD AUTO: 0.37 X10(3) UL (ref 0.1–1)
MONOCYTES NFR BLD AUTO: 7.4 %
NEUTROPHILS # BLD AUTO: 2.42 X10 (3) UL (ref 1.5–7.7)
NEUTROPHILS # BLD AUTO: 2.42 X10(3) UL (ref 1.5–7.7)
NEUTROPHILS NFR BLD AUTO: 48.8 %
NONHDLC SERPL-MCNC: 119 MG/DL (ref ?–130)
OSMOLALITY SERPL CALC.SUM OF ELEC: 285 MOSM/KG (ref 275–295)
PATIENT FASTING Y/N/NP: YES
PATIENT FASTING Y/N/NP: YES
PLATELET # BLD AUTO: 238 10(3)UL (ref 150–450)
POTASSIUM SERPL-SCNC: 4.3 MMOL/L (ref 3.5–5.1)
RBC # BLD AUTO: 4.61 X10(6)UL (ref 3.8–5.3)
SODIUM SERPL-SCNC: 138 MMOL/L (ref 136–145)
TRIGL SERPL-MCNC: 142 MG/DL (ref 30–149)
VLDLC SERPL CALC-MCNC: 28 MG/DL (ref 0–30)
WBC # BLD AUTO: 5 X10(3) UL (ref 4–11)

## 2020-01-16 PROCEDURE — 36415 COLL VENOUS BLD VENIPUNCTURE: CPT | Performed by: FAMILY MEDICINE

## 2020-01-16 PROCEDURE — 80061 LIPID PANEL: CPT | Performed by: FAMILY MEDICINE

## 2020-01-17 DIAGNOSIS — E78.00 ELEVATED CHOLESTEROL: Primary | ICD-10-CM

## 2020-02-06 RX ORDER — BUTALBITAL, ACETAMINOPHEN AND CAFFEINE 50; 325; 40 MG/1; MG/1; MG/1
1 CAPSULE ORAL EVERY 6 HOURS PRN
Qty: 30 CAPSULE | Refills: 0 | Status: SHIPPED | OUTPATIENT
Start: 2020-02-06 | End: 2021-02-05 | Stop reason: ALTCHOICE

## 2020-02-06 NOTE — TELEPHONE ENCOUNTER
Last office visit: 6/25/19  Last refill: 7/6/18  Labs Due: 7/17/2020  Future Appointments   Date Time Provider Eladia Nunes   3/3/2020 10:00 AM Bautista Wong MD [facility]     Protocol:

## 2020-05-19 ENCOUNTER — TELEPHONE (OUTPATIENT)
Dept: FAMILY MEDICINE CLINIC | Facility: CLINIC | Age: 50
End: 2020-05-19

## 2020-05-19 RX ORDER — ATORVASTATIN CALCIUM 10 MG/1
10 TABLET, FILM COATED ORAL NIGHTLY
Qty: 90 TABLET | Refills: 0 | Status: SHIPPED | OUTPATIENT
Start: 2020-05-19 | End: 2020-09-05

## 2020-05-19 NOTE — TELEPHONE ENCOUNTER
[facility]atin [facility]am [facility] [place]. Pt had labs drawn in jan is that ok? I tried to schedule a doctors appt.

## 2020-05-19 NOTE — TELEPHONE ENCOUNTER
LAST SEEN June 25, 2019  NEXT LIPID DUE July 2020  MELOXICAM ORDERED BY  [facility]    Not Available - VOICEMAIL FULL- NEEDS TO SEE  [facility] END OF JUNE FOR 1 YEAR F/U      By Mathieu Franklin RN

## 2020-05-27 ENCOUNTER — TELEPHONE (OUTPATIENT)
Dept: FAMILY MEDICINE CLINIC | Facility: CLINIC | Age: 50
End: 2020-05-27

## 2020-07-07 ENCOUNTER — OFFICE VISIT (OUTPATIENT)
Dept: FAMILY MEDICINE CLINIC | Facility: CLINIC | Age: 50
End: 2020-07-07
Payer: COMMERCIAL

## 2020-07-07 VITALS
WEIGHT: 264.5 LBS | DIASTOLIC BLOOD PRESSURE: 82 MMHG | BODY MASS INDEX: 45.16 KG/M2 | SYSTOLIC BLOOD PRESSURE: 134 MMHG | HEIGHT: 64 IN | OXYGEN SATURATION: 96 % | TEMPERATURE: 99 F | HEART RATE: 78 BPM

## 2020-07-07 DIAGNOSIS — M60.9 INTERCOSTAL MYOSITIS: Primary | ICD-10-CM

## 2020-07-07 PROCEDURE — 99213 OFFICE O/P EST LOW 20 MIN: CPT | Performed by: FAMILY MEDICINE

## 2020-07-07 RX ORDER — CYCLOBENZAPRINE HCL 10 MG
10 TABLET ORAL NIGHTLY
Qty: 10 TABLET | Refills: 0 | Status: SHIPPED | OUTPATIENT
Start: 2020-07-07 | End: 2020-07-17

## 2020-07-07 RX ORDER — MELOXICAM 15 MG/1
15 TABLET ORAL DAILY
COMMUNITY
End: 2021-03-02

## 2020-07-07 RX ORDER — MECLIZINE HYDROCHLORIDE 25 MG/1
25 TABLET ORAL 3 TIMES DAILY PRN
COMMUNITY

## 2020-07-07 NOTE — PROGRESS NOTES
Freddie Waldron is a 52year old female. Patient presents with:  Back Pain: upper RT side back spasms on and off for approx 1 month--monday it was very bad- throughout the area. ...room 1      HPI:   Patient works at the [employer].   She shilpa History:  Social History    Tobacco Use      Smoking status: Never Smoker      Smokeless tobacco: Never Used    Alcohol use: Not Currently      Alcohol/week: 0.0 standard drinks      Comment: OCCASIONAL    Drug use: No       REVIEW OF SYSTEMS:   GENERAL HE of the defined types were placed in this encounter.       Meds & Refills for this Visit:  Requested Prescriptions     Signed Prescriptions Disp Refills   • cyclobenzaprine 10 MG Oral Tab 10 tablet 0     Sig: Take 1 tablet (10 mg total) by mouth nightly for

## 2020-09-01 PROBLEM — Z79.1 NSAID LONG-TERM USE: Status: ACTIVE | Noted: 2020-09-01

## 2020-09-02 ENCOUNTER — LABORATORY ENCOUNTER (OUTPATIENT)
Dept: LAB | Age: 50
End: 2020-09-02
Attending: FAMILY MEDICINE
Payer: COMMERCIAL

## 2020-09-02 DIAGNOSIS — E78.00 ELEVATED CHOLESTEROL: ICD-10-CM

## 2020-09-02 LAB
CHOLEST SMN-MCNC: 117 MG/DL (ref ?–200)
HDLC SERPL-MCNC: 41 MG/DL (ref 40–59)
LDLC SERPL CALC-MCNC: 40 MG/DL (ref ?–100)
NONHDLC SERPL-MCNC: 76 MG/DL (ref ?–130)
PATIENT FASTING Y/N/NP: YES
TRIGL SERPL-MCNC: 180 MG/DL (ref 30–149)
VLDLC SERPL CALC-MCNC: 36 MG/DL (ref 0–30)

## 2020-09-02 PROCEDURE — 80061 LIPID PANEL: CPT | Performed by: FAMILY MEDICINE

## 2020-09-02 PROCEDURE — 36415 COLL VENOUS BLD VENIPUNCTURE: CPT | Performed by: FAMILY MEDICINE

## 2020-09-05 RX ORDER — ATORVASTATIN CALCIUM 10 MG/1
TABLET, FILM COATED ORAL
Qty: 90 TABLET | Refills: 3 | Status: SHIPPED | OUTPATIENT
Start: 2020-09-05 | End: 2021-11-03

## 2020-10-15 ENCOUNTER — IMMUNIZATION (OUTPATIENT)
Dept: FAMILY MEDICINE CLINIC | Facility: CLINIC | Age: 50
End: 2020-10-15
Payer: COMMERCIAL

## 2020-10-15 DIAGNOSIS — Z23 NEED FOR VACCINATION: ICD-10-CM

## 2020-10-15 PROCEDURE — 90686 IIV4 VACC NO PRSV 0.5 ML IM: CPT | Performed by: FAMILY MEDICINE

## 2020-10-15 PROCEDURE — 90471 IMMUNIZATION ADMIN: CPT | Performed by: FAMILY MEDICINE

## 2020-12-24 ENCOUNTER — TELEPHONE (OUTPATIENT)
Dept: FAMILY MEDICINE CLINIC | Facility: CLINIC | Age: 50
End: 2020-12-24

## 2020-12-24 NOTE — TELEPHONE ENCOUNTER
If she had contact within 6 feet for 15 minutes or more within a 24-hour period and was not wearing a mask, she should quarantine and stay out of work for 2 weeks. If she becomes symptomatic she should be tested.

## 2020-12-24 NOTE — TELEPHONE ENCOUNTER
A [employer] TESTED POSITIVE FOR COVID, PT NOT HAVING SYMPTOMS BUT WANTS TO KNOW IF SHE NEEDS TO GET TESTED OR IF SHE SHOULD QUARANTINE, OR IS IT SAFE FOR HER TO GO TO WORK TODAY?

## 2021-01-22 ENCOUNTER — TELEPHONE (OUTPATIENT)
Dept: FAMILY MEDICINE CLINIC | Facility: CLINIC | Age: 51
End: 2021-01-22

## 2021-01-22 NOTE — TELEPHONE ENCOUNTER
Pt has been having headaches for the last 2 weeks, can she come in to see Nelsy Ma or should she be seen in the respitory clinic?

## 2021-01-22 NOTE — TELEPHONE ENCOUNTER
Reports daily headaches x 2 weeks. Severity varies from mild to severe. History of migraines, takes butalbital. She states she took butalbital today, but medication only took the edge off and did not provide complete relief.      Sometimes wakes up congeste

## 2021-01-23 ENCOUNTER — OFFICE VISIT (OUTPATIENT)
Dept: FAMILY MEDICINE CLINIC | Facility: CLINIC | Age: 51
End: 2021-01-23
Payer: COMMERCIAL

## 2021-01-23 VITALS
DIASTOLIC BLOOD PRESSURE: 86 MMHG | OXYGEN SATURATION: 98 % | BODY MASS INDEX: 45.3 KG/M2 | HEART RATE: 70 BPM | SYSTOLIC BLOOD PRESSURE: 138 MMHG | HEIGHT: 64 IN | TEMPERATURE: 98 F | WEIGHT: 265.38 LBS

## 2021-01-23 DIAGNOSIS — J01.00 ACUTE MAXILLARY SINUSITIS, RECURRENCE NOT SPECIFIED: Primary | ICD-10-CM

## 2021-01-23 DIAGNOSIS — M25.551 HIP PAIN, RIGHT: ICD-10-CM

## 2021-01-23 DIAGNOSIS — G44.219 FREQUENT EPISODIC TENSION-TYPE HEADACHE: ICD-10-CM

## 2021-01-23 PROCEDURE — 3075F SYST BP GE 130 - 139MM HG: CPT | Performed by: FAMILY MEDICINE

## 2021-01-23 PROCEDURE — 99214 OFFICE O/P EST MOD 30 MIN: CPT | Performed by: FAMILY MEDICINE

## 2021-01-23 PROCEDURE — 3079F DIAST BP 80-89 MM HG: CPT | Performed by: FAMILY MEDICINE

## 2021-01-23 PROCEDURE — 3008F BODY MASS INDEX DOCD: CPT | Performed by: FAMILY MEDICINE

## 2021-01-23 RX ORDER — PREDNISONE 20 MG/1
40 TABLET ORAL DAILY
Qty: 10 TABLET | Refills: 0 | Status: SHIPPED | OUTPATIENT
Start: 2021-01-23 | End: 2021-01-28

## 2021-01-23 RX ORDER — AMOXICILLIN AND CLAVULANATE POTASSIUM 875; 125 MG/1; MG/1
1 TABLET, FILM COATED ORAL 2 TIMES DAILY
Qty: 20 TABLET | Refills: 0 | Status: SHIPPED | OUTPATIENT
Start: 2021-01-23 | End: 2021-02-02

## 2021-01-23 NOTE — PROGRESS NOTES
HPI:    Patient ID: Zahida Mckeon is a 48year old female. Hx migraines  Last 1 yr increase headaches  Last 2 wks more headaches  + sinus pressure    HPI    Review of Systems   Constitutional: Negative for chills and fever.    HENT: Positive for congesti Skin is warm and dry. Vitals reviewed. /86   Pulse 70   Temp 98 °F (36.7 °C) (Temporal)   Ht 5' 4\" (1.626 m)   Wt 265 lb 6 oz (120.4 kg)   SpO2 98%   BMI 45.55 kg/m²   333 jaw pattern bilateral  Activations with resolution of symptoms.     Rec

## 2021-01-28 ENCOUNTER — PATIENT MESSAGE (OUTPATIENT)
Dept: FAMILY MEDICINE CLINIC | Facility: CLINIC | Age: 51
End: 2021-01-28

## 2021-01-28 DIAGNOSIS — E78.00 HIGH CHOLESTEROL: ICD-10-CM

## 2021-01-28 DIAGNOSIS — Z79.1 NSAID LONG-TERM USE: Primary | ICD-10-CM

## 2021-01-28 NOTE — TELEPHONE ENCOUNTER
From: Katia Jackson  To: Shiva Fulton DO  Sent: 1/28/2021 1:53 PM CST  Subject: Non-Urgent Medical Question    I am scheduled for my annual checkup next Friday 2/5.  Can I come in on Tuesday or Wednesday morning for bloodwork so we have results to d

## 2021-02-02 ENCOUNTER — LABORATORY ENCOUNTER (OUTPATIENT)
Dept: LAB | Age: 51
End: 2021-02-02
Attending: FAMILY MEDICINE
Payer: COMMERCIAL

## 2021-02-02 DIAGNOSIS — E78.00 HIGH CHOLESTEROL: ICD-10-CM

## 2021-02-02 DIAGNOSIS — Z79.1 NSAID LONG-TERM USE: ICD-10-CM

## 2021-02-02 LAB
ALBUMIN SERPL-MCNC: 3.8 G/DL (ref 3.4–5)
ALBUMIN/GLOB SERPL: 1.1 {RATIO} (ref 1–2)
ALP LIVER SERPL-CCNC: 49 U/L
ALT SERPL-CCNC: 37 U/L
ANION GAP SERPL CALC-SCNC: 2 MMOL/L (ref 0–18)
AST SERPL-CCNC: 25 U/L (ref 15–37)
BILIRUB SERPL-MCNC: 0.6 MG/DL (ref 0.1–2)
BUN BLD-MCNC: 12 MG/DL (ref 7–18)
BUN/CREAT SERPL: 14.5 (ref 10–20)
CALCIUM BLD-MCNC: 8.9 MG/DL (ref 8.5–10.1)
CHLORIDE SERPL-SCNC: 107 MMOL/L (ref 98–112)
CHOLEST SMN-MCNC: 154 MG/DL (ref ?–200)
CO2 SERPL-SCNC: 29 MMOL/L (ref 21–32)
CREAT BLD-MCNC: 0.83 MG/DL
GLOBULIN PLAS-MCNC: 3.4 G/DL (ref 2.8–4.4)
GLUCOSE BLD-MCNC: 96 MG/DL (ref 70–99)
HDLC SERPL-MCNC: 48 MG/DL (ref 40–59)
LDLC SERPL CALC-MCNC: 65 MG/DL (ref ?–100)
M PROTEIN MFR SERPL ELPH: 7.2 G/DL (ref 6.4–8.2)
NONHDLC SERPL-MCNC: 106 MG/DL (ref ?–130)
OSMOLALITY SERPL CALC.SUM OF ELEC: 286 MOSM/KG (ref 275–295)
PATIENT FASTING Y/N/NP: YES
PATIENT FASTING Y/N/NP: YES
POTASSIUM SERPL-SCNC: 4.3 MMOL/L (ref 3.5–5.1)
SODIUM SERPL-SCNC: 138 MMOL/L (ref 136–145)
TRIGL SERPL-MCNC: 206 MG/DL (ref 30–149)
VLDLC SERPL CALC-MCNC: 41 MG/DL (ref 0–30)

## 2021-02-02 PROCEDURE — 36415 COLL VENOUS BLD VENIPUNCTURE: CPT | Performed by: FAMILY MEDICINE

## 2021-02-02 PROCEDURE — 80061 LIPID PANEL: CPT | Performed by: FAMILY MEDICINE

## 2021-02-02 PROCEDURE — 80053 COMPREHEN METABOLIC PANEL: CPT | Performed by: FAMILY MEDICINE

## 2021-02-05 ENCOUNTER — OFFICE VISIT (OUTPATIENT)
Dept: FAMILY MEDICINE CLINIC | Facility: CLINIC | Age: 51
End: 2021-02-05
Payer: COMMERCIAL

## 2021-02-05 VITALS
DIASTOLIC BLOOD PRESSURE: 70 MMHG | HEIGHT: 64 IN | OXYGEN SATURATION: 98 % | WEIGHT: 269 LBS | HEART RATE: 68 BPM | TEMPERATURE: 98 F | SYSTOLIC BLOOD PRESSURE: 108 MMHG | BODY MASS INDEX: 45.93 KG/M2 | RESPIRATION RATE: 16 BRPM

## 2021-02-05 DIAGNOSIS — M25.551 HIP PAIN, RIGHT: ICD-10-CM

## 2021-02-05 DIAGNOSIS — M19.042 PRIMARY OSTEOARTHRITIS OF BOTH HANDS: ICD-10-CM

## 2021-02-05 DIAGNOSIS — Z12.31 ENCOUNTER FOR SCREENING MAMMOGRAM FOR BREAST CANCER: Primary | ICD-10-CM

## 2021-02-05 DIAGNOSIS — E78.5 HYPERLIPIDEMIA, UNSPECIFIED HYPERLIPIDEMIA TYPE: ICD-10-CM

## 2021-02-05 DIAGNOSIS — E66.01 OBESITY, CLASS III, BMI 40-49.9 (MORBID OBESITY) (HCC): ICD-10-CM

## 2021-02-05 DIAGNOSIS — K21.9 GASTROESOPHAGEAL REFLUX DISEASE, UNSPECIFIED WHETHER ESOPHAGITIS PRESENT: ICD-10-CM

## 2021-02-05 DIAGNOSIS — M19.041 PRIMARY OSTEOARTHRITIS OF BOTH HANDS: ICD-10-CM

## 2021-02-05 DIAGNOSIS — Z79.1 NSAID LONG-TERM USE: ICD-10-CM

## 2021-02-05 DIAGNOSIS — Z12.11 COLON CANCER SCREENING: ICD-10-CM

## 2021-02-05 PROCEDURE — 3074F SYST BP LT 130 MM HG: CPT | Performed by: FAMILY MEDICINE

## 2021-02-05 PROCEDURE — 99396 PREV VISIT EST AGE 40-64: CPT | Performed by: FAMILY MEDICINE

## 2021-02-05 PROCEDURE — 3078F DIAST BP <80 MM HG: CPT | Performed by: FAMILY MEDICINE

## 2021-02-05 PROCEDURE — 99213 OFFICE O/P EST LOW 20 MIN: CPT | Performed by: FAMILY MEDICINE

## 2021-02-05 PROCEDURE — 3008F BODY MASS INDEX DOCD: CPT | Performed by: FAMILY MEDICINE

## 2021-02-05 NOTE — PROGRESS NOTES
Leticia Arteaga is a 48year old female. Patient presents with:  Physical: Room 2      HPI:   Patient complains of dyspepsia. She seems to related to eating tomatoes. No vomiting or diarrhea. No melena or hematochezia.   She takes meloxicam every day fo CARDIOVASCULAR: denies chest pain     EXAM:   /70 (BP Location: Right arm, Patient Position: Sitting, Cuff Size: large)   Pulse 68   Temp 98.2 °F (36.8 °C) (Temporal)   Resp 16   Ht 5' 4\" (1.626 m)   Wt 269 lb (122 kg)   SpO2 98%   BMI 46.17 kg/m² mg/dL   High        160-189 mg/dL       Very high >=190 mg/dL       • FASTING 02/02/2021 Yes   Final   • Glucose 02/02/2021 96  70 - 99 mg/dL Final   • Sodium 02/02/2021 138  136 - 145 mmol/L Final   • Potassium 02/02/2021 4.3  3.5 - 5.1 mmol/L Final   • C going to get the COVID-19 shot today. I have  placed an order for a mammogram.  She will schedule at [facility].  I also given her handout about the weight loss program at [facility]. If She decides she wants to pursue it, I will place an order.   We had a lo

## 2021-03-12 DIAGNOSIS — Z23 NEED FOR VACCINATION: ICD-10-CM

## 2021-03-21 ENCOUNTER — LAB ENCOUNTER (OUTPATIENT)
Dept: LAB | Facility: HOSPITAL | Age: 51
End: 2021-03-21
Attending: INTERNAL MEDICINE
Payer: COMMERCIAL

## 2021-03-21 DIAGNOSIS — Z01.818 PRE-OP TESTING: ICD-10-CM

## 2021-03-21 LAB — SARS-COV-2 RNA RESP QL NAA+PROBE: NOT DETECTED

## 2021-03-23 ENCOUNTER — ANESTHESIA (OUTPATIENT)
Dept: ENDOSCOPY | Facility: HOSPITAL | Age: 51
End: 2021-03-23
Payer: COMMERCIAL

## 2021-03-23 ENCOUNTER — ANESTHESIA EVENT (OUTPATIENT)
Dept: ENDOSCOPY | Facility: HOSPITAL | Age: 51
End: 2021-03-23
Payer: COMMERCIAL

## 2021-03-23 ENCOUNTER — HOSPITAL ENCOUNTER (OUTPATIENT)
Facility: HOSPITAL | Age: 51
Setting detail: HOSPITAL OUTPATIENT SURGERY
Discharge: HOME | End: 2021-03-23
Attending: INTERNAL MEDICINE | Admitting: INTERNAL MEDICINE
Payer: COMMERCIAL

## 2021-03-23 VITALS
RESPIRATION RATE: 18 BRPM | HEART RATE: 71 BPM | BODY MASS INDEX: 46.1 KG/M2 | OXYGEN SATURATION: 98 % | SYSTOLIC BLOOD PRESSURE: 134 MMHG | TEMPERATURE: 97 F | WEIGHT: 270 LBS | DIASTOLIC BLOOD PRESSURE: 78 MMHG | HEIGHT: 64 IN

## 2021-03-23 DIAGNOSIS — Z01.818 PRE-OP TESTING: Primary | ICD-10-CM

## 2021-03-23 DIAGNOSIS — Z12.11 SCREENING FOR MALIGNANT NEOPLASM OF COLON: ICD-10-CM

## 2021-03-23 DIAGNOSIS — K21.9 GASTROESOPHAGEAL REFLUX DISEASE, UNSPECIFIED WHETHER ESOPHAGITIS PRESENT: ICD-10-CM

## 2021-03-23 PROCEDURE — 0DB98ZX EXCISION OF DUODENUM, VIA NATURAL OR ARTIFICIAL OPENING ENDOSCOPIC, DIAGNOSTIC: ICD-10-PCS | Performed by: INTERNAL MEDICINE

## 2021-03-23 PROCEDURE — 88305 TISSUE EXAM BY PATHOLOGIST: CPT | Performed by: INTERNAL MEDICINE

## 2021-03-23 PROCEDURE — 0DB58ZX EXCISION OF ESOPHAGUS, VIA NATURAL OR ARTIFICIAL OPENING ENDOSCOPIC, DIAGNOSTIC: ICD-10-PCS | Performed by: INTERNAL MEDICINE

## 2021-03-23 PROCEDURE — 0DB78ZX EXCISION OF STOMACH, PYLORUS, VIA NATURAL OR ARTIFICIAL OPENING ENDOSCOPIC, DIAGNOSTIC: ICD-10-PCS | Performed by: INTERNAL MEDICINE

## 2021-03-23 PROCEDURE — 0DBN8ZX EXCISION OF SIGMOID COLON, VIA NATURAL OR ARTIFICIAL OPENING ENDOSCOPIC, DIAGNOSTIC: ICD-10-PCS | Performed by: INTERNAL MEDICINE

## 2021-03-23 PROCEDURE — 0DBH8ZX EXCISION OF CECUM, VIA NATURAL OR ARTIFICIAL OPENING ENDOSCOPIC, DIAGNOSTIC: ICD-10-PCS | Performed by: INTERNAL MEDICINE

## 2021-03-23 RX ORDER — NALOXONE HYDROCHLORIDE 0.4 MG/ML
80 INJECTION, SOLUTION INTRAMUSCULAR; INTRAVENOUS; SUBCUTANEOUS AS NEEDED
Status: DISCONTINUED | OUTPATIENT
Start: 2021-03-23 | End: 2021-03-23

## 2021-03-23 RX ORDER — OMEPRAZOLE 40 MG/1
40 CAPSULE, DELAYED RELEASE ORAL DAILY
Qty: 90 CAPSULE | Refills: 3 | Status: SHIPPED | OUTPATIENT
Start: 2021-03-23 | End: 2022-03-18

## 2021-03-23 RX ORDER — SODIUM CHLORIDE, SODIUM LACTATE, POTASSIUM CHLORIDE, CALCIUM CHLORIDE 600; 310; 30; 20 MG/100ML; MG/100ML; MG/100ML; MG/100ML
INJECTION, SOLUTION INTRAVENOUS CONTINUOUS
Status: DISCONTINUED | OUTPATIENT
Start: 2021-03-23 | End: 2021-03-23

## 2021-03-23 RX ORDER — LIDOCAINE HYDROCHLORIDE 10 MG/ML
INJECTION, SOLUTION EPIDURAL; INFILTRATION; INTRACAUDAL; PERINEURAL AS NEEDED
Status: DISCONTINUED | OUTPATIENT
Start: 2021-03-23 | End: 2021-03-23 | Stop reason: SURG

## 2021-03-23 RX ORDER — FEXOFENADINE HCL 180 MG/1
180 TABLET ORAL DAILY
COMMUNITY

## 2021-03-23 RX ADMIN — LIDOCAINE HYDROCHLORIDE 50 MG: 10 INJECTION, SOLUTION EPIDURAL; INFILTRATION; INTRACAUDAL; PERINEURAL at 08:35:00

## 2021-03-23 RX ADMIN — SODIUM CHLORIDE, SODIUM LACTATE, POTASSIUM CHLORIDE, CALCIUM CHLORIDE: 600; 310; 30; 20 INJECTION, SOLUTION INTRAVENOUS at 09:00:00

## 2021-03-23 NOTE — ANESTHESIA PREPROCEDURE EVALUATION
PRE-OP EVALUATION    Patient Name: Jennifer Perkins    Admit Diagnosis: Screening for malignant neoplasm of colon [Z12.11]  Gastroesophageal reflux disease, unspecified whether esophagitis present [K21.9]    Pre-op Diagnosis: Screening for malignant neoplas repair early 2000's   • OTHER SURGICAL HISTORY      thyroglossal duct cyst   • TOTAL ABDOM HYSTERECTOMY  1/8/2008     Social History    Tobacco Use      Smoking status: Never Smoker      Smokeless tobacco: Never Used    Alcohol use:  Yes      Alcohol/week:

## 2021-03-23 NOTE — ANESTHESIA POSTPROCEDURE EVALUATION
[id number] [facility] Patient Status:  Hospital Outpatient Surgery   Age/Gender 48year old female MRN [id number]   Location [address] [facility]. Attending Anderson Meyers MD   [facility] Day # 0 PCP Hyacinth Adhikari DO       Anesthesia Pos

## 2021-03-23 NOTE — OPERATIVE REPORT
[address] [facility] Patient Status:  Hospital Outpatient Surgery    1970 MRN [id number]   [facility] ENDOSCOPY Attending Tonio Lujan MD   Date 3/23/2021 PCP Jl Correa DO     PREOPERATIVE DIAGNOSIS/INDICATION: Screening  P years.    Eliseo Gum  3/23/2021  8:57 AM

## 2021-03-23 NOTE — H&P
[address] [place] [address],[address] Patient Status:  [facility] Outpatient Surgery    1970 MRN [id number]   [facility] ENDOSCOPY Attending Sarina Adamson MD   Date 3/23/2021 PCP Yunior Rizvi DO     CC:   Harjit Surgical History:   Procedure Laterality Date   •       x3   • HYSTERECTOMY      benign   • DEMETRIS BIOPSY STEREO NODULE 1 SITE RIGHT (CPT=19081)  2015    fibroadenoma   • OTHER      left achilles tendon repair early    • OTHER SURGICAL HISTOR

## 2021-03-23 NOTE — OPERATIVE REPORT
[address] [facility] Patient Status:  Hospital Outpatient Surgery    1970 MRN [id number]   [facility] ENDOSCOPY Attending Ambar Bañuelos MD   Date 3/23/2021 PCP Edna Tovar DO     PREOPERATIVE DIAGNOSIS/INDICATION: GERD  POSTOP

## 2021-03-27 NOTE — PROGRESS NOTES
Date: 3/26/2021    To: Frankey   : 1970     I hope this letter finds you doing well. I am writing to inform you of the following:      The biopsies obtained at the time of your recent upper endoscopic procedure were benign and showed no evid

## 2021-06-30 ENCOUNTER — PATIENT MESSAGE (OUTPATIENT)
Dept: FAMILY MEDICINE CLINIC | Facility: CLINIC | Age: 51
End: 2021-06-30

## 2021-06-30 NOTE — TELEPHONE ENCOUNTER
From: Saima Waters  To: Nicole Cramer DO  Sent: 6/30/2021 9:53 AM CDT  Subject: Non-Urgent Medical Question    I am planning a trip out of state in August. I have had vertigo in the past but have not had an attack in more than a year.  My  is

## 2021-06-30 NOTE — TELEPHONE ENCOUNTER
Unfortunately, it is impossible to predict. If driving around town has not been a problem, I would assume it would be okay. May be helpful to bring the meclizine with.

## 2021-09-29 ENCOUNTER — OFFICE VISIT (OUTPATIENT)
Dept: FAMILY MEDICINE CLINIC | Facility: CLINIC | Age: 51
End: 2021-09-29
Payer: COMMERCIAL

## 2021-09-29 ENCOUNTER — HOSPITAL ENCOUNTER (OUTPATIENT)
Dept: GENERAL RADIOLOGY | Age: 51
Discharge: HOME | End: 2021-09-29
Attending: FAMILY MEDICINE
Payer: COMMERCIAL

## 2021-09-29 VITALS
HEART RATE: 122 BPM | WEIGHT: 284 LBS | BODY MASS INDEX: 48.49 KG/M2 | HEIGHT: 64 IN | OXYGEN SATURATION: 97 % | DIASTOLIC BLOOD PRESSURE: 80 MMHG | SYSTOLIC BLOOD PRESSURE: 148 MMHG | TEMPERATURE: 97 F

## 2021-09-29 DIAGNOSIS — Z23 NEED FOR VACCINATION: ICD-10-CM

## 2021-09-29 DIAGNOSIS — M79.672 PAIN IN LEFT FOOT: Primary | ICD-10-CM

## 2021-09-29 DIAGNOSIS — M79.672 PAIN IN LEFT FOOT: ICD-10-CM

## 2021-09-29 PROCEDURE — 90471 IMMUNIZATION ADMIN: CPT | Performed by: FAMILY MEDICINE

## 2021-09-29 PROCEDURE — 3079F DIAST BP 80-89 MM HG: CPT | Performed by: FAMILY MEDICINE

## 2021-09-29 PROCEDURE — 90686 IIV4 VACC NO PRSV 0.5 ML IM: CPT | Performed by: FAMILY MEDICINE

## 2021-09-29 PROCEDURE — 3077F SYST BP >= 140 MM HG: CPT | Performed by: FAMILY MEDICINE

## 2021-09-29 PROCEDURE — 3008F BODY MASS INDEX DOCD: CPT | Performed by: FAMILY MEDICINE

## 2021-09-29 PROCEDURE — 99214 OFFICE O/P EST MOD 30 MIN: CPT | Performed by: FAMILY MEDICINE

## 2021-09-29 PROCEDURE — 73630 X-RAY EXAM OF FOOT: CPT | Performed by: FAMILY MEDICINE

## 2021-09-29 PROCEDURE — L3260 AMBULATORY SURGICAL BOOT EAC: HCPCS | Performed by: FAMILY MEDICINE

## 2021-09-29 NOTE — PROGRESS NOTES
Subjective:   Hany Tom is a 46year old female who presents for Foot Injury (inrm.  5)     Pain in the left foot  Seems like arthritis  Seems more painful than prior  As on the feet more it feels like  Stabbing    No known injury    ?stumbled on the

## 2021-09-30 ENCOUNTER — MOBILE ENCOUNTER (OUTPATIENT)
Dept: FAMILY MEDICINE CLINIC | Facility: CLINIC | Age: 51
End: 2021-09-30

## 2021-10-18 ENCOUNTER — TELEPHONE (OUTPATIENT)
Dept: FAMILY MEDICINE CLINIC | Facility: CLINIC | Age: 51
End: 2021-10-18

## 2021-10-18 NOTE — TELEPHONE ENCOUNTER
Julio العلي states she is having vertigo episodes about every 2 weeks. They have become worse. Meclizine helps, but it takes >24hrs for sx to resolve. Usually when she has an episode she is in bed for 24 hrs because she is very dizzy.  She has to hold on to

## 2021-10-20 ENCOUNTER — OFFICE VISIT (OUTPATIENT)
Dept: FAMILY MEDICINE CLINIC | Facility: CLINIC | Age: 51
End: 2021-10-20
Payer: COMMERCIAL

## 2021-10-20 VITALS
HEART RATE: 78 BPM | BODY MASS INDEX: 48.17 KG/M2 | RESPIRATION RATE: 20 BRPM | DIASTOLIC BLOOD PRESSURE: 62 MMHG | HEIGHT: 64 IN | TEMPERATURE: 98 F | SYSTOLIC BLOOD PRESSURE: 122 MMHG | WEIGHT: 282.13 LBS | OXYGEN SATURATION: 97 %

## 2021-10-20 DIAGNOSIS — M25.551 BILATERAL HIP PAIN: ICD-10-CM

## 2021-10-20 DIAGNOSIS — H81.11 BENIGN PAROXYSMAL POSITIONAL VERTIGO OF RIGHT EAR: Primary | ICD-10-CM

## 2021-10-20 DIAGNOSIS — M25.552 BILATERAL HIP PAIN: ICD-10-CM

## 2021-10-20 DIAGNOSIS — M54.50 ACUTE BILATERAL LOW BACK PAIN WITHOUT SCIATICA: ICD-10-CM

## 2021-10-20 PROCEDURE — 3008F BODY MASS INDEX DOCD: CPT | Performed by: FAMILY MEDICINE

## 2021-10-20 PROCEDURE — 99214 OFFICE O/P EST MOD 30 MIN: CPT | Performed by: FAMILY MEDICINE

## 2021-10-20 PROCEDURE — 3074F SYST BP LT 130 MM HG: CPT | Performed by: FAMILY MEDICINE

## 2021-10-20 PROCEDURE — 3078F DIAST BP <80 MM HG: CPT | Performed by: FAMILY MEDICINE

## 2021-10-20 NOTE — PROGRESS NOTES
Scott Byrd is a 46year old female. Patient presents with:  Vertigo: has had 4 vertigo attacks in the last 2 months-pt states he is down for 24 hours when this happens. ...room 1  Hip Pain: bilateral hip pain-comes from lower back per pt-has gotten wo headaches almost daily per dr has to do with   • Hearing loss Around 2005 2006    Hearing aid in right ear   • Hearing loss in right ear    • Heavy menses Unknown    Had hysterectomy in 2008   • High cholesterol 2019    Been on atorvastatin and it is contr use: No       REVIEW OF SYSTEMS:   GENERAL HEALTH: feels well otherwise  SKIN: denies any unusual skin lesions or rashes  RESPIRATORY: denies shortness of breath   CARDIOVASCULAR: denies chest pain   GI: denies nausea, vomiting, diarrhea or abdominal pain defined types were placed in this encounter.       Meds & Refills for this Visit:  Requested Prescriptions      No prescriptions requested or ordered in this encounter       Imaging & Consults:  ENT - EXTERNAL  OP REFERRAL TO [facility]

## 2021-11-01 ENCOUNTER — MED REC SCAN ONLY (OUTPATIENT)
Dept: FAMILY MEDICINE CLINIC | Facility: CLINIC | Age: 51
End: 2021-11-01

## 2021-11-03 ENCOUNTER — PATIENT MESSAGE (OUTPATIENT)
Dept: FAMILY MEDICINE CLINIC | Facility: CLINIC | Age: 51
End: 2021-11-03

## 2021-11-03 RX ORDER — MECLIZINE HYDROCHLORIDE 25 MG/1
25 TABLET ORAL 3 TIMES DAILY PRN
Refills: 0 | Status: CANCELLED | OUTPATIENT
Start: 2021-11-03

## 2021-11-03 RX ORDER — ATORVASTATIN CALCIUM 10 MG/1
10 TABLET, FILM COATED ORAL NIGHTLY
Qty: 90 TABLET | Refills: 0 | Status: SHIPPED | OUTPATIENT
Start: 2021-11-03 | End: 2022-02-01

## 2021-11-03 NOTE — TELEPHONE ENCOUNTER
LOV: 02/05/21    LAST LAB: 02/02/21    LAST RX: 08/06/19 - meclizine  09/05/20 - atorvastatin     Next OV:   Future Appointments   Date Time Provider Eladia Nunes   11/6/2021  9:40 AM [address] [facility]   12/29/2021  9:20 AM Nathaly Rand

## 2021-11-03 NOTE — TELEPHONE ENCOUNTER
From: Scott Byrd  To: Mikey Aase, DO  Sent: 11/3/2021 10:25 AM CDT  Subject: Medication refills    I need refills for Meclizine and for Atorvastatin. Please call them in to [facility] in [place].  And do I need to have bloodwork done before refills o

## 2021-11-06 ENCOUNTER — HOSPITAL ENCOUNTER (OUTPATIENT)
Dept: MAMMOGRAPHY | Age: 51
Discharge: HOME | End: 2021-11-06
Attending: FAMILY MEDICINE
Payer: COMMERCIAL

## 2021-11-06 DIAGNOSIS — Z12.31 ENCOUNTER FOR SCREENING MAMMOGRAM FOR BREAST CANCER: ICD-10-CM

## 2021-11-06 PROCEDURE — 77063 BREAST TOMOSYNTHESIS BI: CPT | Performed by: FAMILY MEDICINE

## 2021-11-06 PROCEDURE — 77067 SCR MAMMO BI INCL CAD: CPT | Performed by: FAMILY MEDICINE

## 2021-12-02 ENCOUNTER — MED REC SCAN ONLY (OUTPATIENT)
Dept: FAMILY MEDICINE CLINIC | Facility: CLINIC | Age: 51
End: 2021-12-02

## 2022-01-05 ENCOUNTER — OFFICE VISIT (OUTPATIENT)
Dept: INTERNAL MEDICINE CLINIC | Facility: CLINIC | Age: 52
End: 2022-01-05
Payer: COMMERCIAL

## 2022-01-05 VITALS
HEART RATE: 80 BPM | BODY MASS INDEX: 49.17 KG/M2 | DIASTOLIC BLOOD PRESSURE: 80 MMHG | HEIGHT: 63.5 IN | RESPIRATION RATE: 16 BRPM | WEIGHT: 281 LBS | SYSTOLIC BLOOD PRESSURE: 142 MMHG

## 2022-01-05 DIAGNOSIS — M19.042 PRIMARY OSTEOARTHRITIS OF BOTH HANDS: ICD-10-CM

## 2022-01-05 DIAGNOSIS — R06.81 APNEA: ICD-10-CM

## 2022-01-05 DIAGNOSIS — E55.9 VITAMIN D DEFICIENCY: ICD-10-CM

## 2022-01-05 DIAGNOSIS — K21.9 GASTROESOPHAGEAL REFLUX DISEASE, UNSPECIFIED WHETHER ESOPHAGITIS PRESENT: ICD-10-CM

## 2022-01-05 DIAGNOSIS — Z51.81 THERAPEUTIC DRUG MONITORING: Primary | ICD-10-CM

## 2022-01-05 DIAGNOSIS — R06.83 SNORING: ICD-10-CM

## 2022-01-05 DIAGNOSIS — M19.041 PRIMARY OSTEOARTHRITIS OF BOTH HANDS: ICD-10-CM

## 2022-01-05 DIAGNOSIS — E66.01 OBESITY, CLASS III, BMI 40-49.9 (MORBID OBESITY) (HCC): ICD-10-CM

## 2022-01-05 PROCEDURE — 3077F SYST BP >= 140 MM HG: CPT | Performed by: INTERNAL MEDICINE

## 2022-01-05 PROCEDURE — 3008F BODY MASS INDEX DOCD: CPT | Performed by: INTERNAL MEDICINE

## 2022-01-05 PROCEDURE — 99204 OFFICE O/P NEW MOD 45 MIN: CPT | Performed by: INTERNAL MEDICINE

## 2022-01-05 PROCEDURE — 3079F DIAST BP 80-89 MM HG: CPT | Performed by: INTERNAL MEDICINE

## 2022-01-05 RX ORDER — SPIRONOLACTONE 25 MG/1
TABLET ORAL
COMMUNITY
Start: 2021-11-11

## 2022-01-05 RX ORDER — FLUTICASONE PROPIONATE 50 MCG
SPRAY, SUSPENSION (ML) NASAL
COMMUNITY
Start: 2021-11-11

## 2022-01-05 NOTE — PROGRESS NOTES
HISTORY OF PRESENT ILLNESS  Patient presents with:  Weight Problem: ref by PCP,tried keto, counting carbs       Ragini Mt is a 46year old female new to our office today for initiation of medical weight loss program.  Patient presents today with c/o bariatric surgery: negative     [address] reviewed: obesity in parent/s or sibling: YES     REVIEW OF SYSTEMS  GENERAL: feels well otherwise,   NECK: denies thickening   LUNGS: denies shortness of breath with exertion, ++ apnea   CARDIOVASCULAR: denies chest sheba  02/02/2021    CO2 29.0 02/02/2021     No results found for: EAG, A1C  Lab Results   Component Value Date    CHOLEST 154 02/02/2021    TRIG 206 (H) 02/02/2021    HDL 48 02/02/2021    LDL 65 02/02/2021    VLDL 41 (H) 02/02/2021    TCHDLRATIO 5.16 (H) TO DIETITIAN [facility] WLC (WLC USE ONLY)  -     OP REFERRAL TO DIAGNOSTIC SLEEP STUDY  -     [facility]    Primary osteoarthritis of both hands  -     B12 AND FOLATE; Future  -     CBC WITH DIFFERENTIAL WITH PLATELET;  Future  -     COMP METABOLIC P the role of sleep and stress in weight management. · Labs orders as above. · Counseled on comprehensive weight loss plan including attention to nutrition, exercise and behavior/stress management for success.  See patient instruction below for more details [employer] blog for healthy recipe ideas  5. DietBabyList. Exp[employer] Beyond for low carb resources    HIGH PROTEIN SNACK IDEAS  -cottage cheese  -plain yogurt  -kefir  -hard-boiled eggs  -natural cheeses  -nuts (measure portion size)   -unsweetened nut butters  -dried ed

## 2022-01-05 NOTE — PATIENT INSTRUCTIONS
Plan:  Continue with medications:   Go to the lab for blood work   Follow up with me in 1 month  Schedule follow up appointments: Jerri Arias (dietitian) or Henok Crowe (presurgery dietitian)   Check for insurance coverage for dietitian and labwork pr for sodium issues)   -hummus with vegetables  -bean dip with vegetables    FRUIT  Low carb fruit options   Raspberries: Half a cup (60 grams) contains 3 grams of carbs. Blackberries: Half a cup (70 grams) contains 4 grams of carbs.   Strawberries: Half a c

## 2022-01-07 ENCOUNTER — LAB ENCOUNTER (OUTPATIENT)
Dept: LAB | Age: 52
End: 2022-01-07
Attending: INTERNAL MEDICINE
Payer: COMMERCIAL

## 2022-01-07 DIAGNOSIS — M19.042 PRIMARY OSTEOARTHRITIS OF BOTH HANDS: ICD-10-CM

## 2022-01-07 DIAGNOSIS — E66.01 OBESITY, CLASS III, BMI 40-49.9 (MORBID OBESITY) (HCC): ICD-10-CM

## 2022-01-07 DIAGNOSIS — Z51.81 THERAPEUTIC DRUG MONITORING: ICD-10-CM

## 2022-01-07 DIAGNOSIS — E55.9 VITAMIN D DEFICIENCY: ICD-10-CM

## 2022-01-07 DIAGNOSIS — M19.041 PRIMARY OSTEOARTHRITIS OF BOTH HANDS: ICD-10-CM

## 2022-01-07 DIAGNOSIS — K21.9 GASTROESOPHAGEAL REFLUX DISEASE, UNSPECIFIED WHETHER ESOPHAGITIS PRESENT: ICD-10-CM

## 2022-01-07 LAB
ALBUMIN SERPL-MCNC: 3.9 G/DL (ref 3.4–5)
ALBUMIN/GLOB SERPL: 1.1 {RATIO} (ref 1–2)
ALP LIVER SERPL-CCNC: 70 U/L
ALT SERPL-CCNC: 42 U/L
ANION GAP SERPL CALC-SCNC: 5 MMOL/L (ref 0–18)
AST SERPL-CCNC: 34 U/L (ref 15–37)
BASOPHILS # BLD AUTO: 0.06 X10(3) UL (ref 0–0.2)
BASOPHILS NFR BLD AUTO: 0.9 %
BILIRUB SERPL-MCNC: 0.8 MG/DL (ref 0.1–2)
BUN BLD-MCNC: 16 MG/DL (ref 7–18)
CALCIUM BLD-MCNC: 9.4 MG/DL (ref 8.5–10.1)
CHLORIDE SERPL-SCNC: 103 MMOL/L (ref 98–112)
CHOLEST SERPL-MCNC: 155 MG/DL (ref ?–200)
CO2 SERPL-SCNC: 29 MMOL/L (ref 21–32)
CREAT BLD-MCNC: 0.76 MG/DL
EOSINOPHIL # BLD AUTO: 0.1 X10(3) UL (ref 0–0.7)
EOSINOPHIL NFR BLD AUTO: 1.5 %
ERYTHROCYTE [DISTWIDTH] IN BLOOD BY AUTOMATED COUNT: 13.8 %
EST. AVERAGE GLUCOSE BLD GHB EST-MCNC: 117 MG/DL (ref 68–126)
FASTING PATIENT LIPID ANSWER: YES
FASTING STATUS PATIENT QL REPORTED: YES
FOLATE SERPL-MCNC: 10.1 NG/ML (ref 8.7–?)
GLOBULIN PLAS-MCNC: 3.5 G/DL (ref 2.8–4.4)
GLUCOSE BLD-MCNC: 108 MG/DL (ref 70–99)
HBA1C MFR BLD: 5.7 % (ref ?–5.7)
HCT VFR BLD AUTO: 38.8 %
HDLC SERPL-MCNC: 44 MG/DL (ref 40–59)
HGB BLD-MCNC: 12.3 G/DL
IMM GRANULOCYTES # BLD AUTO: 0.08 X10(3) UL (ref 0–1)
IMM GRANULOCYTES NFR BLD: 1.2 %
LDLC SERPL CALC-MCNC: 83 MG/DL (ref ?–100)
LYMPHOCYTES # BLD AUTO: 1.7 X10(3) UL (ref 1–4)
LYMPHOCYTES NFR BLD AUTO: 25.1 %
MCH RBC QN AUTO: 27.8 PG (ref 26–34)
MCHC RBC AUTO-ENTMCNC: 31.7 G/DL (ref 31–37)
MCV RBC AUTO: 87.8 FL
MONOCYTES # BLD AUTO: 0.58 X10(3) UL (ref 0.1–1)
MONOCYTES NFR BLD AUTO: 8.6 %
NEUTROPHILS # BLD AUTO: 4.25 X10 (3) UL (ref 1.5–7.7)
NEUTROPHILS # BLD AUTO: 4.25 X10(3) UL (ref 1.5–7.7)
NEUTROPHILS NFR BLD AUTO: 62.7 %
NONHDLC SERPL-MCNC: 111 MG/DL (ref ?–130)
OSMOLALITY SERPL CALC.SUM OF ELEC: 286 MOSM/KG (ref 275–295)
PLATELET # BLD AUTO: 266 10(3)UL (ref 150–450)
POTASSIUM SERPL-SCNC: 4.7 MMOL/L (ref 3.5–5.1)
PROT SERPL-MCNC: 7.4 G/DL (ref 6.4–8.2)
RBC # BLD AUTO: 4.42 X10(6)UL
SODIUM SERPL-SCNC: 137 MMOL/L (ref 136–145)
T4 FREE SERPL-MCNC: 1.1 NG/DL (ref 0.8–1.7)
TRIGL SERPL-MCNC: 161 MG/DL (ref 30–149)
TSI SER-ACNC: 2.1 MIU/ML (ref 0.36–3.74)
VIT B12 SERPL-MCNC: 247 PG/ML (ref 193–986)
VIT D+METAB SERPL-MCNC: 15.7 NG/ML (ref 30–100)
VLDLC SERPL CALC-MCNC: 26 MG/DL (ref 0–30)
WBC # BLD AUTO: 6.8 X10(3) UL (ref 4–11)

## 2022-01-07 PROCEDURE — 82746 ASSAY OF FOLIC ACID SERUM: CPT | Performed by: INTERNAL MEDICINE

## 2022-01-07 PROCEDURE — 82306 VITAMIN D 25 HYDROXY: CPT | Performed by: INTERNAL MEDICINE

## 2022-01-07 PROCEDURE — 84439 ASSAY OF FREE THYROXINE: CPT | Performed by: INTERNAL MEDICINE

## 2022-01-07 PROCEDURE — 82607 VITAMIN B-12: CPT | Performed by: INTERNAL MEDICINE

## 2022-01-07 PROCEDURE — 80050 GENERAL HEALTH PANEL: CPT | Performed by: INTERNAL MEDICINE

## 2022-01-07 PROCEDURE — 80061 LIPID PANEL: CPT | Performed by: INTERNAL MEDICINE

## 2022-01-07 PROCEDURE — 83036 HEMOGLOBIN GLYCOSYLATED A1C: CPT | Performed by: INTERNAL MEDICINE

## 2022-01-11 ENCOUNTER — ORDER TRANSCRIPTION (OUTPATIENT)
Dept: SLEEP CENTER | Age: 52
End: 2022-01-11

## 2022-01-11 DIAGNOSIS — Z01.818 PREOP EXAMINATION: Primary | ICD-10-CM

## 2022-01-11 DIAGNOSIS — Z11.59 SCREENING FOR VIRAL DISEASE: ICD-10-CM

## 2022-01-13 ENCOUNTER — MED REC SCAN ONLY (OUTPATIENT)
Dept: FAMILY MEDICINE CLINIC | Facility: CLINIC | Age: 52
End: 2022-01-13

## 2022-01-18 ENCOUNTER — OFFICE VISIT (OUTPATIENT)
Dept: INTERNAL MEDICINE CLINIC | Facility: CLINIC | Age: 52
End: 2022-01-18
Payer: COMMERCIAL

## 2022-01-18 VITALS — BODY MASS INDEX: 49 KG/M2 | WEIGHT: 281 LBS

## 2022-01-18 DIAGNOSIS — K21.00 GASTROESOPHAGEAL REFLUX DISEASE WITH ESOPHAGITIS WITHOUT HEMORRHAGE: ICD-10-CM

## 2022-01-18 DIAGNOSIS — E66.01 OBESITY, CLASS III, BMI 40-49.9 (MORBID OBESITY) (HCC): ICD-10-CM

## 2022-01-18 PROCEDURE — 97802 MEDICAL NUTRITION INDIV IN: CPT | Performed by: DIETITIAN, REGISTERED

## 2022-01-18 NOTE — PROGRESS NOTES
INITIAL OUTPATIENT NUTRITION CONSULTATION    Nutrition Assessment    Medical Diagnosis: Obesity and GERD, IBS    Physical Findings: joint pain, impaired sleep and Diarrhea/constipation when IBS is active    Client Age and Gender: 46year old female kg)  01/07/22 : 281 lb 12.8 oz (127.8 kg)      BMI Readings from Last 1 Encounters:  01/18/22 : 49.00 kg/m²    Diet/Weight History: Max weight of 284 lbs in Sept 2021. Weighed this previously.   Lost 37 lb in 3 months in work weight loss challenge at work small serving per meal was recommended. Limiting fruit to 2 servings daily was recommended. Pt set calorie goal to attainable limit. Prefers to attempt weight loss without medication at this time. Family is obese, sons are 360 lbs.   Pt is hoping to [employer]

## 2022-01-24 ENCOUNTER — LAB ENCOUNTER (OUTPATIENT)
Dept: LAB | Age: 52
End: 2022-01-24
Attending: INTERNAL MEDICINE
Payer: COMMERCIAL

## 2022-01-24 DIAGNOSIS — Z11.59 SCREENING FOR VIRAL DISEASE: ICD-10-CM

## 2022-01-24 DIAGNOSIS — Z01.818 PREOP EXAMINATION: ICD-10-CM

## 2022-01-25 LAB — SARS-COV-2 RNA RESP QL NAA+PROBE: NOT DETECTED

## 2022-01-26 ENCOUNTER — OFFICE VISIT (OUTPATIENT)
Dept: SLEEP CENTER | Age: 52
End: 2022-01-26
Attending: INTERNAL MEDICINE
Payer: COMMERCIAL

## 2022-01-26 DIAGNOSIS — M19.042 PRIMARY OSTEOARTHRITIS OF BOTH HANDS: ICD-10-CM

## 2022-01-26 DIAGNOSIS — E66.01 OBESITY, CLASS III, BMI 40-49.9 (MORBID OBESITY) (HCC): ICD-10-CM

## 2022-01-26 DIAGNOSIS — K21.9 GASTROESOPHAGEAL REFLUX DISEASE, UNSPECIFIED WHETHER ESOPHAGITIS PRESENT: ICD-10-CM

## 2022-01-26 DIAGNOSIS — R06.83 SNORING: ICD-10-CM

## 2022-01-26 DIAGNOSIS — M19.041 PRIMARY OSTEOARTHRITIS OF BOTH HANDS: ICD-10-CM

## 2022-01-26 DIAGNOSIS — R06.81 APNEA: ICD-10-CM

## 2022-01-26 DIAGNOSIS — Z51.81 THERAPEUTIC DRUG MONITORING: ICD-10-CM

## 2022-01-26 DIAGNOSIS — E55.9 VITAMIN D DEFICIENCY: ICD-10-CM

## 2022-01-26 PROCEDURE — 95810 POLYSOM 6/> YRS 4/> PARAM: CPT

## 2022-02-08 RX ORDER — ATORVASTATIN CALCIUM 10 MG/1
TABLET, FILM COATED ORAL
Qty: 90 TABLET | Refills: 0 | Status: SHIPPED | OUTPATIENT
Start: 2022-02-08

## 2022-02-22 ENCOUNTER — OFFICE VISIT (OUTPATIENT)
Dept: INTERNAL MEDICINE CLINIC | Facility: CLINIC | Age: 52
End: 2022-02-22
Payer: COMMERCIAL

## 2022-02-22 VITALS — WEIGHT: 274 LBS | BODY MASS INDEX: 48 KG/M2

## 2022-02-22 DIAGNOSIS — E66.01 OBESITY, CLASS III, BMI 40-49.9 (MORBID OBESITY) (HCC): ICD-10-CM

## 2022-02-22 PROCEDURE — 97803 MED NUTRITION INDIV SUBSEQ: CPT | Performed by: DIETITIAN, REGISTERED

## 2022-02-24 ENCOUNTER — OFFICE VISIT (OUTPATIENT)
Dept: FAMILY MEDICINE CLINIC | Facility: CLINIC | Age: 52
End: 2022-02-24
Payer: COMMERCIAL

## 2022-02-24 VITALS
WEIGHT: 273.13 LBS | BODY MASS INDEX: 47.8 KG/M2 | SYSTOLIC BLOOD PRESSURE: 126 MMHG | DIASTOLIC BLOOD PRESSURE: 76 MMHG | OXYGEN SATURATION: 98 % | TEMPERATURE: 98 F | HEART RATE: 60 BPM | HEIGHT: 63.5 IN | RESPIRATION RATE: 18 BRPM

## 2022-02-24 DIAGNOSIS — L60.0 INGROWN TOENAIL: ICD-10-CM

## 2022-02-24 DIAGNOSIS — E55.9 VITAMIN D DEFICIENCY: ICD-10-CM

## 2022-02-24 DIAGNOSIS — Z00.00 PREVENTATIVE HEALTH CARE: Primary | ICD-10-CM

## 2022-02-24 PROCEDURE — 99396 PREV VISIT EST AGE 40-64: CPT | Performed by: FAMILY MEDICINE

## 2022-02-24 PROCEDURE — 3008F BODY MASS INDEX DOCD: CPT | Performed by: FAMILY MEDICINE

## 2022-02-24 PROCEDURE — 3078F DIAST BP <80 MM HG: CPT | Performed by: FAMILY MEDICINE

## 2022-02-24 PROCEDURE — 3074F SYST BP LT 130 MM HG: CPT | Performed by: FAMILY MEDICINE

## 2022-02-24 RX ORDER — AZELASTINE HYDROCHLORIDE 137 UG/1
2 SPRAY, METERED NASAL DAILY
COMMUNITY
Start: 2022-02-15

## 2022-03-01 ENCOUNTER — OFFICE VISIT (OUTPATIENT)
Dept: INTERNAL MEDICINE CLINIC | Facility: CLINIC | Age: 52
End: 2022-03-01
Payer: COMMERCIAL

## 2022-03-01 VITALS
WEIGHT: 273 LBS | OXYGEN SATURATION: 97 % | RESPIRATION RATE: 18 BRPM | BODY MASS INDEX: 47.77 KG/M2 | SYSTOLIC BLOOD PRESSURE: 134 MMHG | HEIGHT: 63.5 IN | DIASTOLIC BLOOD PRESSURE: 90 MMHG | HEART RATE: 65 BPM

## 2022-03-01 DIAGNOSIS — E53.8 B12 DEFICIENCY: ICD-10-CM

## 2022-03-01 DIAGNOSIS — K21.00 GASTROESOPHAGEAL REFLUX DISEASE WITH ESOPHAGITIS WITHOUT HEMORRHAGE: ICD-10-CM

## 2022-03-01 DIAGNOSIS — Z51.81 THERAPEUTIC DRUG MONITORING: Primary | ICD-10-CM

## 2022-03-01 DIAGNOSIS — R73.03 PREDIABETES: ICD-10-CM

## 2022-03-01 DIAGNOSIS — E55.9 VITAMIN D DEFICIENCY: ICD-10-CM

## 2022-03-01 PROCEDURE — 96372 THER/PROPH/DIAG INJ SC/IM: CPT | Performed by: INTERNAL MEDICINE

## 2022-03-01 PROCEDURE — 3008F BODY MASS INDEX DOCD: CPT | Performed by: INTERNAL MEDICINE

## 2022-03-01 PROCEDURE — 3080F DIAST BP >= 90 MM HG: CPT | Performed by: INTERNAL MEDICINE

## 2022-03-01 PROCEDURE — 3075F SYST BP GE 130 - 139MM HG: CPT | Performed by: INTERNAL MEDICINE

## 2022-03-01 PROCEDURE — 99214 OFFICE O/P EST MOD 30 MIN: CPT | Performed by: INTERNAL MEDICINE

## 2022-03-01 RX ORDER — CYANOCOBALAMIN 1000 UG/ML
1000 INJECTION INTRAMUSCULAR; SUBCUTANEOUS ONCE
Status: COMPLETED | OUTPATIENT
Start: 2022-03-01 | End: 2022-03-01

## 2022-03-01 RX ADMIN — CYANOCOBALAMIN 1000 MCG: 1000 INJECTION INTRAMUSCULAR; SUBCUTANEOUS at 09:17:00

## 2022-03-14 ENCOUNTER — LAB ENCOUNTER (OUTPATIENT)
Dept: LAB | Age: 52
End: 2022-03-14
Attending: EMERGENCY MEDICINE
Payer: COMMERCIAL

## 2022-03-14 DIAGNOSIS — Z11.59 SCREENING FOR VIRAL DISEASE: ICD-10-CM

## 2022-03-14 DIAGNOSIS — Z01.818 PREOP EXAMINATION: ICD-10-CM

## 2022-03-15 LAB — SARS-COV-2 RNA RESP QL NAA+PROBE: NOT DETECTED

## 2022-03-17 ENCOUNTER — OFFICE VISIT (OUTPATIENT)
Dept: SLEEP CENTER | Age: 52
End: 2022-03-17
Attending: INTERNAL MEDICINE
Payer: COMMERCIAL

## 2022-03-17 DIAGNOSIS — G47.33 OSA (OBSTRUCTIVE SLEEP APNEA): ICD-10-CM

## 2022-03-17 PROCEDURE — 95811 POLYSOM 6/>YRS CPAP 4/> PARM: CPT

## 2022-04-03 RX ORDER — ERGOCALCIFEROL 1.25 MG/1
CAPSULE ORAL
Qty: 12 CAPSULE | Refills: 0 | OUTPATIENT
Start: 2022-04-03

## 2022-04-03 NOTE — TELEPHONE ENCOUNTER
Requesting Vitamin D  LOV: 1/5/22  RTC: one month  Last Relevant Labs: 1/7/22  Filled: 1/11/22 #12 with 0 refills    5/10/2022  8:20 AM Kimmy Chi MD [facility]     Should be on OTC Vitamin D daily now - RX denied as therapy completed

## 2022-04-05 ENCOUNTER — NURSE ONLY (OUTPATIENT)
Dept: INTERNAL MEDICINE CLINIC | Facility: CLINIC | Age: 52
End: 2022-04-05
Payer: COMMERCIAL

## 2022-04-05 DIAGNOSIS — E53.8 B12 DEFICIENCY: Primary | ICD-10-CM

## 2022-04-05 PROCEDURE — 96372 THER/PROPH/DIAG INJ SC/IM: CPT | Performed by: INTERNAL MEDICINE

## 2022-04-05 RX ORDER — CYANOCOBALAMIN 1000 UG/ML
1000 INJECTION INTRAMUSCULAR; SUBCUTANEOUS ONCE
Status: COMPLETED | OUTPATIENT
Start: 2022-04-05 | End: 2022-04-05

## 2022-04-05 RX ADMIN — CYANOCOBALAMIN 1000 MCG: 1000 INJECTION INTRAMUSCULAR; SUBCUTANEOUS at 14:02:00

## 2022-04-20 RX ORDER — ATORVASTATIN CALCIUM 10 MG/1
TABLET, FILM COATED ORAL
Qty: 90 TABLET | Refills: 0 | Status: SHIPPED | OUTPATIENT
Start: 2022-04-20

## 2022-04-20 NOTE — TELEPHONE ENCOUNTER
LOV: 02/24/22    LAST LAB: 01/07/22    LAST RX: 02/08/22    Next OV:   Future Appointments   Date Time Provider Eladia Nunes   5/3/2022  9:00 AM Lew Monroy RD [facility]   5/10/2022  8:20 AM Kimmy Chi MD [facility]       PROTOCOL  Cholesterol Medication Protocol Passed 04/20/2022 11:33 AM   Protocol Details  ALT < 80    ALT resulted within past year    Lipid panel within past 12 months    Appointment within past 12 or next 3 months

## 2022-05-03 ENCOUNTER — OFFICE VISIT (OUTPATIENT)
Dept: INTERNAL MEDICINE CLINIC | Facility: CLINIC | Age: 52
End: 2022-05-03
Payer: COMMERCIAL

## 2022-05-03 VITALS — WEIGHT: 270 LBS | BODY MASS INDEX: 47 KG/M2

## 2022-05-03 DIAGNOSIS — R73.03 PREDIABETES: ICD-10-CM

## 2022-05-03 DIAGNOSIS — K21.00 GASTROESOPHAGEAL REFLUX DISEASE WITH ESOPHAGITIS, UNSPECIFIED WHETHER HEMORRHAGE: ICD-10-CM

## 2022-05-03 DIAGNOSIS — E66.01 OBESITY, CLASS III, BMI 40-49.9 (MORBID OBESITY) (HCC): ICD-10-CM

## 2022-05-03 PROCEDURE — 97803 MED NUTRITION INDIV SUBSEQ: CPT | Performed by: DIETITIAN, REGISTERED

## 2022-05-10 ENCOUNTER — OFFICE VISIT (OUTPATIENT)
Dept: INTERNAL MEDICINE CLINIC | Facility: CLINIC | Age: 52
End: 2022-05-10
Payer: COMMERCIAL

## 2022-05-10 VITALS
OXYGEN SATURATION: 97 % | SYSTOLIC BLOOD PRESSURE: 122 MMHG | DIASTOLIC BLOOD PRESSURE: 80 MMHG | BODY MASS INDEX: 30.27 KG/M2 | RESPIRATION RATE: 16 BRPM | WEIGHT: 173 LBS | HEIGHT: 63.5 IN | HEART RATE: 67 BPM

## 2022-05-10 DIAGNOSIS — R73.03 PREDIABETES: ICD-10-CM

## 2022-05-10 DIAGNOSIS — E53.8 B12 DEFICIENCY: Primary | ICD-10-CM

## 2022-05-10 DIAGNOSIS — K21.00 GASTROESOPHAGEAL REFLUX DISEASE WITH ESOPHAGITIS, UNSPECIFIED WHETHER HEMORRHAGE: ICD-10-CM

## 2022-05-10 DIAGNOSIS — Z51.81 THERAPEUTIC DRUG MONITORING: ICD-10-CM

## 2022-05-10 DIAGNOSIS — G47.33 OSA (OBSTRUCTIVE SLEEP APNEA): ICD-10-CM

## 2022-05-10 PROCEDURE — 96372 THER/PROPH/DIAG INJ SC/IM: CPT | Performed by: INTERNAL MEDICINE

## 2022-05-10 PROCEDURE — 99214 OFFICE O/P EST MOD 30 MIN: CPT | Performed by: INTERNAL MEDICINE

## 2022-05-10 PROCEDURE — 3008F BODY MASS INDEX DOCD: CPT | Performed by: INTERNAL MEDICINE

## 2022-05-10 PROCEDURE — 3074F SYST BP LT 130 MM HG: CPT | Performed by: INTERNAL MEDICINE

## 2022-05-10 PROCEDURE — 3079F DIAST BP 80-89 MM HG: CPT | Performed by: INTERNAL MEDICINE

## 2022-05-10 RX ORDER — CYANOCOBALAMIN 1000 UG/ML
1000 INJECTION INTRAMUSCULAR; SUBCUTANEOUS ONCE
Status: COMPLETED | OUTPATIENT
Start: 2022-05-10 | End: 2022-05-10

## 2022-05-10 RX ADMIN — CYANOCOBALAMIN 1000 MCG: 1000 INJECTION INTRAMUSCULAR; SUBCUTANEOUS at 10:55:00

## 2022-06-01 ENCOUNTER — TELEMEDICINE (OUTPATIENT)
Dept: FAMILY MEDICINE CLINIC | Facility: CLINIC | Age: 52
End: 2022-06-01
Payer: COMMERCIAL

## 2022-06-01 VITALS — BODY MASS INDEX: 29.22 KG/M2 | HEIGHT: 63.5 IN | WEIGHT: 167 LBS

## 2022-06-01 DIAGNOSIS — U07.1 COVID-19: Primary | ICD-10-CM

## 2022-06-01 PROCEDURE — 99213 OFFICE O/P EST LOW 20 MIN: CPT | Performed by: FAMILY MEDICINE

## 2022-06-01 RX ORDER — MULTIVIT-MIN/IRON/FOLIC ACID/K 18-600-40
CAPSULE ORAL
COMMUNITY

## 2022-06-01 NOTE — PROGRESS NOTES
Virtual/Telephone Check-In    Henrietta Cobos  verbally consents to a [employer] on 6/1/2022 . Patient understands and accepts financial responsibility for any deductible, co-insurance and/or co-pays associated with this service. this was a phone telemed visit    Duration of the service: 6 minutes      Summary of topics discussed:  See below      Problem List Items Addressed This Visit     None      Visit Diagnoses     COVID-19    -  Primary    immunized, but candidate for paxlovid d/t comorbidity obesity and arthritis and pre diabetes    Relevant Medications    nirmatrelvir & ritonavir 20 x 150 MG & 10 x 100MG Oral Tablet Therapy Pack           Patient presents with:  Covid: pt did a home covid test last night and test positive , pt went to [facility] in [facility] and did a pcr test today       Medications allergies and chart reviewed    COVID-19 protocol      HPI:   Henrietta Cobos is a 46year old female who schedules a virtual visit  No fever  Cough 3 days  s-throat  Loss voice  No loss taste smell    No fatigue or headache    No gi symptom    She had pos rapid last pm  Took pcr this am--pending    Needs off work due to [employer] about paxlovid    fully immunized      Allergies:    Sulfa Antibiotics       RASH    has a current medication list which includes the following prescription(s): vitamin d, nirmatrelvir & ritonavir, omeprazole, atorvastatin, meloxicam, azelastine hcl, spironolactone, fexofenadine, and meclizine.        REVIEW OF SYSTEMS:   GENERAL: feels well otherwise  SKIN: no rashes  EYES:denies blurred vision or double vision  HEENT: see HPI   LUNGS: denies shortness of breath with exertion  CARDIOVASCULAR: denies chest pain on exertion  GI: no nausea or abdominal pain  NEURO: denies headaches      EXAM:   VITALS: not available as this is a telemed visit    GENERAL: No specific issues at this time    rest of physical exam unable to perform as this is a telemed visit  Patient alert and oriented and appropriate during examination did not appear to have any conversational dyspnea nor did she have any obvious discomfort or pain  Did have hoarse voice      ASSESSMENT AND PLAN:     1. COVID-19 (Primary)  Comments:  immunized, but candidate for paxlovid d/t comorbidity obesity and arthritis and pre diabetes  Orders:  -     Nirmatrelvir & Ritonavir; Take two nirmatrelvir tablets (300 mg) with one ritonavir tablet (100 mg) together twice daily for 5 days  Dispense: 30 tablet; Refill: 0        The patient indicates understanding of these issues and agrees to the plan. The patient is asked to return if sx's persist or worsen. Patient had an opportunity to ask questions and they were answered to their satisfaction. \"Please note that this visit was completed using two-way, real-time interactive audio and/or video communication. This has been done in good ion to provide continuity of care in the best interest of the provider-patient relationship, due to the ongoing public health crisis/national emergency and because of restrictions of visitation. There are limitations of this visit as no physical exam could be performed. Every conscious effort was taken to allow for sufficient and adequate time. This billing was spent on reviewing labs, medications, radiology tests and decision making. Appropriate medical decision-making and tests are ordered as detailed in the plan of care above. \"      Sagrario Barber M.D., FAAFP

## 2022-06-10 ENCOUNTER — TELEPHONE (OUTPATIENT)
Dept: FAMILY MEDICINE CLINIC | Facility: CLINIC | Age: 52
End: 2022-06-10

## 2022-06-10 RX ORDER — BENZONATATE 200 MG/1
200 CAPSULE ORAL 3 TIMES DAILY PRN
Qty: 30 CAPSULE | Refills: 0 | Status: SHIPPED | OUTPATIENT
Start: 2022-06-10

## 2022-06-10 NOTE — TELEPHONE ENCOUNTER
Patient advised to try Tessalon Perles for cough. To follow up if cough worsens, if SOB to go to ER. Patient does not have pulse oximeter at home.

## 2022-06-10 NOTE — TELEPHONE ENCOUNTER
Patient had televisit with Dr. Jerad Thomas 6/1/22. Feeling better except for cough, mostly dry. No SOB. No fever. Covid positive 5/31/22. .Taking sudafed sinus with some relief. Dr. Carolee Landa, Rx for cough? Tessalon Perles?

## 2022-06-19 ENCOUNTER — HOSPITAL ENCOUNTER (OUTPATIENT)
Age: 52
Discharge: HOME | End: 2022-06-19
Payer: COMMERCIAL

## 2022-06-19 ENCOUNTER — OFFICE VISIT (OUTPATIENT)
Dept: FAMILY MEDICINE CLINIC | Facility: CLINIC | Age: 52
End: 2022-06-19
Payer: COMMERCIAL

## 2022-06-19 ENCOUNTER — APPOINTMENT (OUTPATIENT)
Dept: GENERAL RADIOLOGY | Age: 52
End: 2022-06-19
Attending: NURSE PRACTITIONER
Payer: COMMERCIAL

## 2022-06-19 VITALS
HEIGHT: 63 IN | WEIGHT: 270 LBS | RESPIRATION RATE: 20 BRPM | SYSTOLIC BLOOD PRESSURE: 150 MMHG | HEART RATE: 96 BPM | TEMPERATURE: 98 F | DIASTOLIC BLOOD PRESSURE: 90 MMHG | OXYGEN SATURATION: 98 % | BODY MASS INDEX: 47.84 KG/M2

## 2022-06-19 DIAGNOSIS — S22.41XA CLOSED FRACTURE OF MULTIPLE RIBS OF RIGHT SIDE, INITIAL ENCOUNTER: Primary | ICD-10-CM

## 2022-06-19 DIAGNOSIS — Z02.9 ADMINISTRATIVE ENCOUNTER: Primary | ICD-10-CM

## 2022-06-19 PROCEDURE — 99204 OFFICE O/P NEW MOD 45 MIN: CPT | Performed by: PHYSICIAN ASSISTANT

## 2022-06-19 PROCEDURE — 71101 X-RAY EXAM UNILAT RIBS/CHEST: CPT | Performed by: NURSE PRACTITIONER

## 2022-06-19 RX ORDER — HYDROCODONE BITARTRATE AND ACETAMINOPHEN 5; 325 MG/1; MG/1
1-2 TABLET ORAL NIGHTLY
Qty: 10 TABLET | Refills: 0 | Status: SHIPPED | OUTPATIENT
Start: 2022-06-19 | End: 2022-06-24
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
## 2022-06-27 ENCOUNTER — OFFICE VISIT (OUTPATIENT)
Dept: FAMILY MEDICINE CLINIC | Facility: CLINIC | Age: 52
End: 2022-06-27
Payer: COMMERCIAL

## 2022-06-27 VITALS
WEIGHT: 276 LBS | SYSTOLIC BLOOD PRESSURE: 152 MMHG | BODY MASS INDEX: 48.9 KG/M2 | HEIGHT: 63 IN | DIASTOLIC BLOOD PRESSURE: 98 MMHG | OXYGEN SATURATION: 98 % | RESPIRATION RATE: 18 BRPM | HEART RATE: 85 BPM | TEMPERATURE: 98 F

## 2022-06-27 DIAGNOSIS — S22.41XD CLOSED FRACTURE OF MULTIPLE RIBS OF RIGHT SIDE WITH ROUTINE HEALING, SUBSEQUENT ENCOUNTER: Primary | ICD-10-CM

## 2022-06-27 PROCEDURE — 3080F DIAST BP >= 90 MM HG: CPT | Performed by: FAMILY MEDICINE

## 2022-06-27 PROCEDURE — 99213 OFFICE O/P EST LOW 20 MIN: CPT | Performed by: FAMILY MEDICINE

## 2022-06-27 PROCEDURE — 3077F SYST BP >= 140 MM HG: CPT | Performed by: FAMILY MEDICINE

## 2022-06-27 PROCEDURE — 3008F BODY MASS INDEX DOCD: CPT | Performed by: FAMILY MEDICINE

## 2022-06-27 RX ORDER — HYDROCODONE BITARTRATE AND ACETAMINOPHEN 10; 300 MG/1; MG/1
1 TABLET ORAL DAILY
COMMUNITY
Start: 2022-06-19

## 2022-07-12 ENCOUNTER — OFFICE VISIT (OUTPATIENT)
Dept: INTERNAL MEDICINE CLINIC | Facility: CLINIC | Age: 52
End: 2022-07-12
Payer: COMMERCIAL

## 2022-07-12 VITALS — WEIGHT: 280 LBS | BODY MASS INDEX: 50 KG/M2

## 2022-07-12 DIAGNOSIS — E78.1 HYPERTRIGLYCERIDEMIA: ICD-10-CM

## 2022-07-12 DIAGNOSIS — K21.00 GASTROESOPHAGEAL REFLUX DISEASE WITH ESOPHAGITIS, UNSPECIFIED WHETHER HEMORRHAGE: ICD-10-CM

## 2022-07-12 DIAGNOSIS — R73.03 PREDIABETES: ICD-10-CM

## 2022-07-12 PROCEDURE — 97803 MED NUTRITION INDIV SUBSEQ: CPT | Performed by: DIETITIAN, REGISTERED

## 2022-07-25 NOTE — MR AVS SNAPSHOT
Mike Oscar  [address] [address]  [phone]               Thank you for choosing us for your health care visit with Cathryn Lopez DO.   We are glad to serve you and happy to provide you with this summ Functional Status questions complete?:  Yes    Assoc Dx:  Plantar fasciitis, right [M72.2]          Reason for Today's Visit     Other           Medical Issues Discussed Today     Plantar fasciitis, right    -  Primary      Instructions and Information ab - Generation of cardiovascular treatment plan.  - Coordination of care with primary team.

## 2022-07-26 RX ORDER — ATORVASTATIN CALCIUM 10 MG/1
TABLET, FILM COATED ORAL
Qty: 90 TABLET | Refills: 0 | Status: SHIPPED | OUTPATIENT
Start: 2022-07-26

## 2022-08-02 ENCOUNTER — OFFICE VISIT (OUTPATIENT)
Dept: INTERNAL MEDICINE CLINIC | Facility: CLINIC | Age: 52
End: 2022-08-02
Payer: COMMERCIAL

## 2022-08-02 VITALS
RESPIRATION RATE: 16 BRPM | WEIGHT: 279 LBS | HEIGHT: 63.5 IN | DIASTOLIC BLOOD PRESSURE: 80 MMHG | HEART RATE: 78 BPM | SYSTOLIC BLOOD PRESSURE: 140 MMHG | BODY MASS INDEX: 48.82 KG/M2

## 2022-08-02 DIAGNOSIS — R73.03 PREDIABETES: ICD-10-CM

## 2022-08-02 DIAGNOSIS — E66.01 OBESITY, CLASS III, BMI 40-49.9 (MORBID OBESITY) (HCC): ICD-10-CM

## 2022-08-02 DIAGNOSIS — Z51.81 THERAPEUTIC DRUG MONITORING: ICD-10-CM

## 2022-08-02 DIAGNOSIS — E53.8 B12 DEFICIENCY: Primary | ICD-10-CM

## 2022-08-02 PROCEDURE — 93000 ELECTROCARDIOGRAM COMPLETE: CPT | Performed by: INTERNAL MEDICINE

## 2022-08-02 PROCEDURE — 99214 OFFICE O/P EST MOD 30 MIN: CPT | Performed by: INTERNAL MEDICINE

## 2022-08-02 PROCEDURE — 3077F SYST BP >= 140 MM HG: CPT | Performed by: INTERNAL MEDICINE

## 2022-08-02 PROCEDURE — 3079F DIAST BP 80-89 MM HG: CPT | Performed by: INTERNAL MEDICINE

## 2022-08-02 PROCEDURE — 96372 THER/PROPH/DIAG INJ SC/IM: CPT | Performed by: INTERNAL MEDICINE

## 2022-08-02 PROCEDURE — 3008F BODY MASS INDEX DOCD: CPT | Performed by: INTERNAL MEDICINE

## 2022-08-02 RX ORDER — CYANOCOBALAMIN 1000 UG/ML
1000 INJECTION INTRAMUSCULAR; SUBCUTANEOUS ONCE
Status: COMPLETED | OUTPATIENT
Start: 2022-08-02 | End: 2022-08-02

## 2022-08-02 RX ORDER — CYANOCOBALAMIN 1000 UG/ML
1000 INJECTION INTRAMUSCULAR; SUBCUTANEOUS ONCE
Status: CANCELLED | OUTPATIENT
Start: 2022-08-02 | End: 2022-08-02

## 2022-08-02 RX ORDER — PHENTERMINE HYDROCHLORIDE 15 MG/1
15 CAPSULE ORAL EVERY MORNING
Qty: 30 CAPSULE | Refills: 1 | Status: SHIPPED | OUTPATIENT
Start: 2022-08-02

## 2022-08-02 RX ADMIN — CYANOCOBALAMIN 1000 MCG: 1000 INJECTION INTRAMUSCULAR; SUBCUTANEOUS at 10:53:00

## 2022-08-03 ENCOUNTER — HOSPITAL ENCOUNTER (OUTPATIENT)
Age: 52
Discharge: HOME | End: 2022-08-03
Payer: COMMERCIAL

## 2022-08-03 VITALS
TEMPERATURE: 97 F | RESPIRATION RATE: 20 BRPM | HEIGHT: 64 IN | OXYGEN SATURATION: 98 % | BODY MASS INDEX: 47.63 KG/M2 | HEART RATE: 83 BPM | SYSTOLIC BLOOD PRESSURE: 148 MMHG | DIASTOLIC BLOOD PRESSURE: 82 MMHG | WEIGHT: 279 LBS

## 2022-08-03 DIAGNOSIS — G57.02 PIRIFORMIS SYNDROME OF LEFT SIDE: Primary | ICD-10-CM

## 2022-08-03 PROCEDURE — 99213 OFFICE O/P EST LOW 20 MIN: CPT | Performed by: PHYSICIAN ASSISTANT

## 2022-08-03 RX ORDER — METHOCARBAMOL 750 MG/1
750 TABLET, FILM COATED ORAL 4 TIMES DAILY
Qty: 21 TABLET | Refills: 0 | Status: SHIPPED | OUTPATIENT
Start: 2022-08-03

## 2022-08-03 RX ORDER — LIDOCAINE 4 G/G
1 PATCH TOPICAL EVERY 24 HOURS
Qty: 10 PATCH | Refills: 0 | Status: SHIPPED | OUTPATIENT
Start: 2022-08-03

## 2022-08-03 RX ORDER — PREDNISONE 20 MG/1
40 TABLET ORAL DAILY
Qty: 10 TABLET | Refills: 0 | Status: SHIPPED | OUTPATIENT
Start: 2022-08-03 | End: 2022-08-08

## 2022-08-03 NOTE — ED INITIAL ASSESSMENT (HPI)
Left hip pain for a few months. Past few days worsening pain and now into her buttock and traveling into her thigh. Taking  otc meds with no relief.

## 2022-08-30 ENCOUNTER — TELEPHONE (OUTPATIENT)
Dept: FAMILY MEDICINE CLINIC | Facility: CLINIC | Age: 52
End: 2022-08-30

## 2022-08-30 ENCOUNTER — HOSPITAL ENCOUNTER (OUTPATIENT)
Age: 52
Discharge: HOME | End: 2022-08-30
Payer: COMMERCIAL

## 2022-08-30 VITALS
HEART RATE: 87 BPM | OXYGEN SATURATION: 98 % | TEMPERATURE: 97 F | RESPIRATION RATE: 16 BRPM | SYSTOLIC BLOOD PRESSURE: 159 MMHG | DIASTOLIC BLOOD PRESSURE: 68 MMHG

## 2022-08-30 DIAGNOSIS — Z20.822 ENCOUNTER FOR LABORATORY TESTING FOR COVID-19 VIRUS: Primary | ICD-10-CM

## 2022-08-30 DIAGNOSIS — J01.10 ACUTE NON-RECURRENT FRONTAL SINUSITIS: ICD-10-CM

## 2022-08-30 LAB — SARS-COV-2 RNA RESP QL NAA+PROBE: NOT DETECTED

## 2022-08-30 PROCEDURE — 99203 OFFICE O/P NEW LOW 30 MIN: CPT | Performed by: NURSE PRACTITIONER

## 2022-08-30 PROCEDURE — U0002 COVID-19 LAB TEST NON-CDC: HCPCS | Performed by: NURSE PRACTITIONER

## 2022-08-30 RX ORDER — METHYLPREDNISOLONE 4 MG/1
TABLET ORAL
Qty: 1 EACH | Refills: 0 | Status: SHIPPED | OUTPATIENT
Start: 2022-08-30

## 2022-08-30 RX ORDER — AMOXICILLIN AND CLAVULANATE POTASSIUM 875; 125 MG/1; MG/1
1 TABLET, FILM COATED ORAL 2 TIMES DAILY
Qty: 20 TABLET | Refills: 0 | Status: SHIPPED | OUTPATIENT
Start: 2022-08-30 | End: 2022-09-09

## 2022-08-30 NOTE — TELEPHONE ENCOUNTER
Patient advised to go to [facility] walk in. She said she will go to  in [place].

## 2022-08-30 NOTE — TELEPHONE ENCOUNTER
PT THINKS SHE HAS EAR INF, HAS HAD HEADACHE, HER SON TESTED POSITIVE FOR COVID, BUT SHE TESTED NEGATIVE, NO OPENINGS, CALL PT

## 2022-09-07 ENCOUNTER — OFFICE VISIT (OUTPATIENT)
Dept: INTERNAL MEDICINE CLINIC | Facility: CLINIC | Age: 52
End: 2022-09-07
Payer: COMMERCIAL

## 2022-09-07 VITALS — BODY MASS INDEX: 48 KG/M2 | WEIGHT: 279.38 LBS

## 2022-09-07 DIAGNOSIS — E66.01 OBESITY, CLASS III, BMI 40-49.9 (MORBID OBESITY) (HCC): ICD-10-CM

## 2022-09-07 DIAGNOSIS — R73.03 PREDIABETES: ICD-10-CM

## 2022-09-07 DIAGNOSIS — K21.00 GASTROESOPHAGEAL REFLUX DISEASE WITH ESOPHAGITIS, UNSPECIFIED WHETHER HEMORRHAGE: ICD-10-CM

## 2022-09-07 PROCEDURE — 97803 MED NUTRITION INDIV SUBSEQ: CPT | Performed by: DIETITIAN, REGISTERED

## 2022-10-25 ENCOUNTER — OFFICE VISIT (OUTPATIENT)
Dept: FAMILY MEDICINE CLINIC | Facility: CLINIC | Age: 52
End: 2022-10-25
Payer: COMMERCIAL

## 2022-10-25 VITALS
RESPIRATION RATE: 18 BRPM | BODY MASS INDEX: 48 KG/M2 | WEIGHT: 279.5 LBS | SYSTOLIC BLOOD PRESSURE: 126 MMHG | OXYGEN SATURATION: 98 % | DIASTOLIC BLOOD PRESSURE: 80 MMHG | HEART RATE: 65 BPM | TEMPERATURE: 98 F

## 2022-10-25 DIAGNOSIS — M54.40 LOW BACK PAIN WITH SCIATICA, SCIATICA LATERALITY UNSPECIFIED, UNSPECIFIED BACK PAIN LATERALITY, UNSPECIFIED CHRONICITY: Primary | ICD-10-CM

## 2022-10-25 PROCEDURE — 99214 OFFICE O/P EST MOD 30 MIN: CPT | Performed by: INTERNAL MEDICINE

## 2022-10-25 PROCEDURE — 3074F SYST BP LT 130 MM HG: CPT | Performed by: INTERNAL MEDICINE

## 2022-10-25 PROCEDURE — 3079F DIAST BP 80-89 MM HG: CPT | Performed by: INTERNAL MEDICINE

## 2022-10-25 RX ORDER — FLUTICASONE PROPIONATE 50 MCG
2 SPRAY, SUSPENSION (ML) NASAL DAILY
Qty: 1 EACH | Refills: 0 | Status: SHIPPED | OUTPATIENT
Start: 2022-10-25 | End: 2023-10-20

## 2022-10-25 RX ORDER — LIDOCAINE 50 MG/G
PATCH TOPICAL
COMMUNITY
Start: 2022-09-11

## 2022-10-25 RX ORDER — METHOCARBAMOL 750 MG/1
750 TABLET, FILM COATED ORAL 4 TIMES DAILY
Qty: 90 TABLET | Refills: 0 | Status: SHIPPED | OUTPATIENT
Start: 2022-10-25 | End: 2022-11-24

## 2022-10-26 ENCOUNTER — TELEPHONE (OUTPATIENT)
Dept: FAMILY MEDICINE CLINIC | Facility: CLINIC | Age: 52
End: 2022-10-26

## 2022-10-26 DIAGNOSIS — M54.40 LOW BACK PAIN WITH SCIATICA, SCIATICA LATERALITY UNSPECIFIED, UNSPECIFIED BACK PAIN LATERALITY, UNSPECIFIED CHRONICITY: Primary | ICD-10-CM

## 2022-10-27 NOTE — TELEPHONE ENCOUNTER
Patient advised. She said she wants to go to Physical therapy at [facility] in [place] she said this usually needs prior authorization with her insurance. She said that for imaging she goes through [facility] through her 's union. The referrals need to be sent through [email or web address].

## 2022-10-27 NOTE — TELEPHONE ENCOUNTER
HISTORY: Left lower back pain     FINDINGS: 5 images are submitted. There is slight curvature of the spine convex to the left centered near the thoracic lumbar junction. No fracture or destructive lesion is seen. There is mild L5-S1 disc space narrowing and minimal L4-5 narrowing. Some anterior spurring is seen in the upper to mid lumbar region with facet degenerative changes in the mid to lower lumbar spine. Minimal left greater than right SI joint spurring is seen. IMPRESSION:   1. Mild degenerative changes as above most prominent at L5-S1 where there is some disc space narrowing and facet change. 2. Slight curvature of the spine convex to the left centered in the thoracic lumbar junction. 3. Low-grade SI joint spurring. I recommend that she repeat PT and we can get an MRI to better understand the problem.

## 2022-10-28 NOTE — TELEPHONE ENCOUNTER
I signed the order for MRI, please addend the PT order and fax them to there respective places. Thanks.

## 2022-11-07 ENCOUNTER — TELEPHONE (OUTPATIENT)
Dept: FAMILY MEDICINE CLINIC | Facility: CLINIC | Age: 52
End: 2022-11-07

## 2022-11-07 NOTE — TELEPHONE ENCOUNTER
Patient advised per Josefina Garcia at the referral department the physical therapy does not require a prior authorization. Per Dangelo at [facility] they check eligibility with the union, we are not required to obtain a prior authorization. Patient advised order for MRI was faxed and the order for PT was faxed to AT.

## 2022-11-14 ENCOUNTER — TELEPHONE (OUTPATIENT)
Dept: FAMILY MEDICINE CLINIC | Facility: CLINIC | Age: 52
End: 2022-11-14

## 2022-11-14 NOTE — TELEPHONE ENCOUNTER
"    [facility] EMERGENCY DEPARTMENT ([facility])       6/03/22  History     Chief Complaint   Patient presents with     Alcohol Intoxication     Looking for detox, 10 shots of vodka daily. Last drink 1.5 hours ago. Denies any withdrawal seizures.     The history is provided by the patient and medical records.     Kodi Dumont is a 32 year old female with a past medical history significant for alcohol abuse who presents to the Emergency Department for evaluation of alcohol intoxication and request for detox..  The patient states that she is in alcohol withdrawal.    Patient reports that she went to [facility] today for detox from alcohol but was told to come to the ED today to detox.  She states that she has been drinking about 7 \"nips\" a day.  She says that her last drink was in the bathroom here in the ED.  She adds that she used to live in a sober living facility but no longer lives there anymore.  Patient denies suicidal ideation.  Patient denies history of alcohol withdrawal seizures.  She denies other substance use.  Social: She used to live in sober living.    History reviewed. No pertinent past medical history.    History reviewed. No pertinent surgical history.    History reviewed. No pertinent family history.    Social History     Tobacco Use     Smoking status: Never Smoker     Smokeless tobacco: Never Used   Substance Use Topics     Alcohol use: Yes     Comment: 5 shots of vodka daily       Current Facility-Administered Medications   Medication     folic acid (FOLVITE) tablet 1 mg     LORazepam (ATIVAN) tablet 1-4 mg     multivitamin w/minerals (THERA-VIT-M) tablet 1 tablet     thiamine (B-1) tablet 100 mg     No current outpatient medications on file.      No Known Allergies      I have reviewed the Medications, Allergies, Past Medical and Surgical History, and Social History in the Epic system.    Review of Systems   Constitutional: Negative for chills and fever.   Eyes: Negative.  " PT, I can order it here; she has insurance through the union, so she may know where else she can go.   Respiratory: Negative for cough and shortness of breath.    Cardiovascular: Negative for chest pain.   Gastrointestinal: Negative for abdominal pain, nausea and vomiting.   Genitourinary: Negative for flank pain.   Musculoskeletal: Negative.    Skin: Negative for rash.   Neurological: Negative for headaches.   Psychiatric/Behavioral: Negative.  Negative for suicidal ideas.   All other systems reviewed and are negative.        Physical Exam   BP: 117/78  Pulse: 101  Temp: 98.4  F (36.9  C)  Resp: 16  Height: 152.4 cm (5')  Weight: 47.6 kg (105 lb)  SpO2: 95 %      Physical Exam  Physical Exam   Constitutional:   well nourished, well developed, appears intoxicated  HENT:   Head: Normocephalic and atraumatic.   Eyes: Conjunctivae are normal. Pupils are equal, round, and reactive to light.   Cardiovascular: regular rate and rhythm without murmurs or gallops  Pulmonary/Chest: Clear to auscultation bilaterally, with no wheezes or retractions. No respiratory distress.  GI: Soft with good bowel sounds.  Non-tender, non-distended  Back:  No bony or CVA tenderness   Musculoskeletal:  no edema  Skin: Skin is warm and dry.   Neurological: alert and oriented to person, place, and time. Nonfocal exam, slightly slurred speech, appears intoxicated  Psychiatric:  Speech is slightly slurred, judgment and thought content impaired mood appears flat. Patient is not agitated, not aggressive, not hyperactive, not actively hallucinating and not combative. Thought content is not paranoid and not delusional. Cognition and memory are normal. No homicidal and no suicidal ideation.   ED Course     At 2:07 PM the patient was seen and examined by Roxana Kidd MD in Room EDHW02.        Procedures            The medical record was reviewed and interpreted.  Current labs reviewed and interpreted.         Results for orders placed or performed during the hospital encounter of 06/03/22 (from the past 24 hour(s))   Alcohol breath test POCT    Result Value Ref Range    Alcohol Breath Test 0.412 (A) 0.00 - 0.01   Urine Drugs of Abuse Screen    Narrative    The following orders were created for panel order Urine Drugs of Abuse Screen.  Procedure                               Abnormality         Status                     ---------                               -----------         ------                     Drug abuse screen 1 urin...[[id number]]                      In process                   Please view results for these tests on the individual orders.   CBC with platelets differential    Narrative    The following orders were created for panel order CBC with platelets differential.  Procedure                               Abnormality         Status                     ---------                               -----------         ------                     CBC with platelets and d...[[id number]]                      Final result                 Please view results for these tests on the individual orders.   CBC with platelets and differential   Result Value Ref Range    WBC Count 8.8 4.0 - 11.0 10e3/uL    RBC Count 3.95 3.80 - 5.20 10e6/uL    Hemoglobin 12.4 11.7 - 15.7 g/dL    Hematocrit 35.7 35.0 - 47.0 %    MCV 90 78 - 100 fL    MCH 31.4 26.5 - 33.0 pg    MCHC 34.7 31.5 - 36.5 g/dL    RDW 11.9 10.0 - 15.0 %    Platelet Count 307 150 - 450 10e3/uL    % Neutrophils 72 %    % Lymphocytes 21 %    % Monocytes 5 %    % Eosinophils 0 %    % Basophils 1 %    % Immature Granulocytes 1 %    NRBCs per 100 WBC 0 <1 /100    Absolute Neutrophils 6.4 1.6 - 8.3 10e3/uL    Absolute Lymphocytes 1.8 0.8 - 5.3 10e3/uL    Absolute Monocytes 0.4 0.0 - 1.3 10e3/uL    Absolute Eosinophils 0.0 0.0 - 0.7 10e3/uL    Absolute Basophils 0.1 0.0 - 0.2 10e3/uL    Absolute Immature Granulocytes 0.0 <=0.4 10e3/uL    Absolute NRBCs 0.0 10e3/uL     Medications   LORazepam (ATIVAN) tablet 1-4 mg (has no administration in time range)   thiamine (B-1) tablet 100 mg (has no administration in  time range)   folic acid (FOLVITE) tablet 1 mg (has no administration in time range)   multivitamin w/minerals (THERA-VIT-M) tablet 1 tablet (has no administration in time range)             Assessments & Plan (with Medical Decision Making)       I have reviewed the nursing notes.  Emergency Department course:  The patient was seen and examined at 1407 pm, in Hallway 2.  Breathalyzer is 0.412..   I treated the patient with thiamine, folic acid, and multivitamins p.o.    CBC is unremarkable.  Comprehensive metabolic panel is pending at the time of this dictation.    Kodi Dumnot is a 32 year old female with a history of alcohol abuse who presents with acute alcohol intoxication and request for detox.  She is on the [facility] protocol for alcohol withdrawal.  She will be admitted here to [facility] for detox.  She was signed out to Dr. Mistry at about 1600 pm for check of laboratory study results.   I have reviewed the findings, diagnosis, plan and need for follow up with the patient.    New Prescriptions    No medications on file       Final diagnoses:   Acute alcoholic intoxication in alcoholism without complication (H)       I, Fabiola Linn am serving as a trained medical scribe to document services personally performed by Roxana Kidd MD, based on the provider's statements to me.      I, Roxana Kidd MD, was physically present and have reviewed and verified the accuracy of this note documented by Fabiola Linn.   This note was created in part by the use of Dragon voice recognition dictation system. Inadvertent grammatical errors and typographical errors may still exist.  MD Roxana Alexander MD  6/3/2022   [facility] EMERGENCY DEPARTMENT     Roxana Kidd MD  06/03/22 [id number]

## 2022-11-14 NOTE — TELEPHONE ENCOUNTER
Your MRI report is back and there is multi-disease, but nothing surgical, you have a few options, you can see a pain specialist, PT or ortho specializing in low back surgery for consultation

## 2022-11-15 ENCOUNTER — MED REC SCAN ONLY (OUTPATIENT)
Dept: FAMILY MEDICINE CLINIC | Facility: CLINIC | Age: 52
End: 2022-11-15

## 2022-11-18 RX ORDER — FLUTICASONE PROPIONATE 50 MCG
SPRAY, SUSPENSION (ML) NASAL
Qty: 16 ML | Refills: 0 | Status: SHIPPED | OUTPATIENT
Start: 2022-11-18

## 2022-12-14 RX ORDER — FLUTICASONE PROPIONATE 50 MCG
SPRAY, SUSPENSION (ML) NASAL
Qty: 16 ML | Refills: 0 | Status: SHIPPED | OUTPATIENT
Start: 2022-12-14

## 2023-01-04 RX ORDER — FLUTICASONE PROPIONATE 50 MCG
2 SPRAY, SUSPENSION (ML) NASAL DAILY
Qty: 16 ML | Refills: 0 | Status: SHIPPED | OUTPATIENT
Start: 2023-01-04

## 2023-01-09 ENCOUNTER — TELEPHONE (OUTPATIENT)
Dept: FAMILY MEDICINE CLINIC | Facility: CLINIC | Age: 53
End: 2023-01-09

## 2023-01-09 NOTE — TELEPHONE ENCOUNTER
Patient said that her arm just started \"hurting\" about 6 days ago and she did not injure it. She said the pain starts in the neck and radiates down her arm and fingers.  Appointment scheduled with Dr Thiago Pearson Thursday at 1pm.

## 2023-01-12 ENCOUNTER — HOSPITAL ENCOUNTER (OUTPATIENT)
Dept: GENERAL RADIOLOGY | Age: 53
Discharge: HOME | End: 2023-01-12
Attending: INTERNAL MEDICINE
Payer: COMMERCIAL

## 2023-01-12 ENCOUNTER — OFFICE VISIT (OUTPATIENT)
Dept: FAMILY MEDICINE CLINIC | Facility: CLINIC | Age: 53
End: 2023-01-12
Payer: COMMERCIAL

## 2023-01-12 VITALS
DIASTOLIC BLOOD PRESSURE: 86 MMHG | TEMPERATURE: 98 F | HEART RATE: 86 BPM | OXYGEN SATURATION: 97 % | SYSTOLIC BLOOD PRESSURE: 142 MMHG | RESPIRATION RATE: 18 BRPM | BODY MASS INDEX: 48 KG/M2 | WEIGHT: 281.38 LBS

## 2023-01-12 DIAGNOSIS — M54.12 CERVICAL RADICULOPATHY AT C6: Primary | ICD-10-CM

## 2023-01-12 DIAGNOSIS — M54.12 RIGHT CERVICAL RADICULOPATHY: ICD-10-CM

## 2023-01-12 DIAGNOSIS — M54.12 RIGHT CERVICAL RADICULOPATHY: Primary | ICD-10-CM

## 2023-01-12 DIAGNOSIS — M54.40 LOW BACK PAIN WITH SCIATICA, SCIATICA LATERALITY UNSPECIFIED, UNSPECIFIED BACK PAIN LATERALITY, UNSPECIFIED CHRONICITY: ICD-10-CM

## 2023-01-12 PROCEDURE — 3077F SYST BP >= 140 MM HG: CPT | Performed by: INTERNAL MEDICINE

## 2023-01-12 PROCEDURE — 72050 X-RAY EXAM NECK SPINE 4/5VWS: CPT | Performed by: INTERNAL MEDICINE

## 2023-01-12 PROCEDURE — 99214 OFFICE O/P EST MOD 30 MIN: CPT | Performed by: INTERNAL MEDICINE

## 2023-01-12 PROCEDURE — 3079F DIAST BP 80-89 MM HG: CPT | Performed by: INTERNAL MEDICINE

## 2023-01-12 RX ORDER — METHOCARBAMOL 750 MG/1
750 TABLET, FILM COATED ORAL 2 TIMES DAILY
Qty: 90 TABLET | Refills: 1 | Status: SHIPPED | OUTPATIENT
Start: 2023-01-12 | End: 2023-04-12

## 2023-01-12 RX ORDER — PREDNISONE 20 MG/1
40 TABLET ORAL DAILY
Qty: 14 TABLET | Refills: 0 | Status: SHIPPED | OUTPATIENT
Start: 2023-01-12 | End: 2023-01-19

## 2023-02-02 ENCOUNTER — TELEPHONE (OUTPATIENT)
Dept: FAMILY MEDICINE CLINIC | Facility: CLINIC | Age: 53
End: 2023-02-02

## 2023-02-02 DIAGNOSIS — M54.12 CERVICAL RADICULOPATHY: Primary | ICD-10-CM

## 2023-02-02 NOTE — TELEPHONE ENCOUNTER
Has to be 6 weeks of symptoms, BUT I will send her to [facility], the neurosurgeon.

## 2023-02-02 NOTE — TELEPHONE ENCOUNTER
HAS BEEN GOING TO PHYSICAL THERAPY FOR 3 WEEKS, NO SIGNIFICANT IMPROVEMENT ON NECK, CALLING ABOUT MAYBE GETTING MRI DONE, OR WHAT TO DO? CALL PT

## 2023-02-03 ENCOUNTER — TELEPHONE (OUTPATIENT)
Dept: FAMILY MEDICINE CLINIC | Facility: CLINIC | Age: 53
End: 2023-02-03

## 2023-02-03 NOTE — TELEPHONE ENCOUNTER
Spoke with patient who states she is still not able to get through and when she did speak again with the answering service, they then said they closed at [address] patient we will speak with Dr. Sweta Juares on Monday and see if there is a different provider she recommends and maybe we will have better luck getting through to their office on Monday. She verbalized understanding and will await a call back from our office on Monday. Dr. Sweta Juares, please advise.

## 2023-02-03 NOTE — TELEPHONE ENCOUNTER
PLEASE CALL REGARDING REFERRAL-  CANNOT GET THROUGH TO THE NUMBER THAT WAS GIVEN-    PLEASE ADVISE-THANK YOU

## 2023-02-03 NOTE — TELEPHONE ENCOUNTER
Spoke with patient who had a hard time getting through. This nurse called and spoke with the answering service who states they are on lunch and set their phones at that time. She did states that Dr. Meir Sy is no long with that group. They are only there until 2pm today. She advised patient call back in a little bit to see who is taking new patient's. This nurse spoke with the patient and she verbalized understanding. She will let us know so we can place that new referral for her as well. Awaiting to hear back from patient.

## 2023-02-06 ENCOUNTER — TELEPHONE (OUTPATIENT)
Dept: FAMILY MEDICINE CLINIC | Facility: CLINIC | Age: 53
End: 2023-02-06

## 2023-02-06 NOTE — TELEPHONE ENCOUNTER
Patient said that she got an appointment with Anshul Herbert in [facility]. Referral updated.

## 2023-02-06 NOTE — TELEPHONE ENCOUNTER
There are some new providers for [facility]. .you can refer to another neurosurgeon

## 2023-02-06 NOTE — TELEPHONE ENCOUNTER
Referred to Neurosurgery. Trying to get an appt since Thursday, has not been able to get through. Tried again today and still not able to speak to anyone. Wants to know if we are able to get a hold of them or if she can be referred elsewhere.

## 2023-02-07 NOTE — TELEPHONE ENCOUNTER
See phone note from yesterday. Patient is going to see Dr Arleth Ramirez in [facility].

## 2023-03-15 LAB — Lab: POSITIVE

## 2023-04-05 ENCOUNTER — OFFICE VISIT (OUTPATIENT)
Dept: FAMILY MEDICINE CLINIC | Facility: CLINIC | Age: 53
End: 2023-04-05
Payer: COMMERCIAL

## 2023-04-05 ENCOUNTER — PATIENT MESSAGE (OUTPATIENT)
Dept: FAMILY MEDICINE CLINIC | Facility: CLINIC | Age: 53
End: 2023-04-05

## 2023-04-05 ENCOUNTER — LABORATORY ENCOUNTER (OUTPATIENT)
Dept: LAB | Age: 53
End: 2023-04-05
Attending: INTERNAL MEDICINE
Payer: COMMERCIAL

## 2023-04-05 DIAGNOSIS — Z12.31 ENCOUNTER FOR SCREENING MAMMOGRAM FOR MALIGNANT NEOPLASM OF BREAST: Primary | ICD-10-CM

## 2023-04-05 DIAGNOSIS — Z00.00 WELL ADULT EXAM: ICD-10-CM

## 2023-04-05 DIAGNOSIS — D50.8 OTHER IRON DEFICIENCY ANEMIA: ICD-10-CM

## 2023-04-05 DIAGNOSIS — M65.322 ACQUIRED TRIGGER FINGER OF LEFT INDEX FINGER: ICD-10-CM

## 2023-04-05 LAB
ALBUMIN SERPL-MCNC: 3.6 G/DL (ref 3.4–5)
ALBUMIN/GLOB SERPL: 0.9 {RATIO} (ref 1–2)
ALP LIVER SERPL-CCNC: 56 U/L
ALT SERPL-CCNC: 61 U/L
ANION GAP SERPL CALC-SCNC: 6 MMOL/L (ref 0–18)
AST SERPL-CCNC: 49 U/L (ref 15–37)
BASOPHILS # BLD AUTO: 0.05 X10(3) UL (ref 0–0.2)
BASOPHILS NFR BLD AUTO: 0.8 %
BILIRUB SERPL-MCNC: 0.8 MG/DL (ref 0.1–2)
BUN BLD-MCNC: 11 MG/DL (ref 7–18)
CALCIUM BLD-MCNC: 8.8 MG/DL (ref 8.5–10.1)
CHLORIDE SERPL-SCNC: 105 MMOL/L (ref 98–112)
CHOLEST SERPL-MCNC: 127 MG/DL (ref ?–200)
CO2 SERPL-SCNC: 27 MMOL/L (ref 21–32)
CREAT BLD-MCNC: 0.93 MG/DL
DEPRECATED HBV CORE AB SER IA-ACNC: 7.9 NG/ML
EOSINOPHIL # BLD AUTO: 0.07 X10(3) UL (ref 0–0.7)
EOSINOPHIL NFR BLD AUTO: 1.2 %
ERYTHROCYTE [DISTWIDTH] IN BLOOD BY AUTOMATED COUNT: 17.1 %
FASTING PATIENT LIPID ANSWER: YES
FASTING STATUS PATIENT QL REPORTED: YES
GFR SERPLBLD BASED ON 1.73 SQ M-ARVRAT: 74 ML/MIN/1.73M2 (ref 60–?)
GLOBULIN PLAS-MCNC: 3.8 G/DL (ref 2.8–4.4)
GLUCOSE BLD-MCNC: 95 MG/DL (ref 70–99)
HCT VFR BLD AUTO: 36.2 %
HDLC SERPL-MCNC: 38 MG/DL (ref 40–59)
HGB BLD-MCNC: 11.3 G/DL
IMM GRANULOCYTES # BLD AUTO: 0.02 X10(3) UL (ref 0–1)
IMM GRANULOCYTES NFR BLD: 0.3 %
LDLC SERPL CALC-MCNC: 63 MG/DL (ref ?–100)
LYMPHOCYTES # BLD AUTO: 2.07 X10(3) UL (ref 1–4)
LYMPHOCYTES NFR BLD AUTO: 35.1 %
MCH RBC QN AUTO: 24.3 PG (ref 26–34)
MCHC RBC AUTO-ENTMCNC: 31.2 G/DL (ref 31–37)
MCV RBC AUTO: 77.8 FL
MONOCYTES # BLD AUTO: 0.53 X10(3) UL (ref 0.1–1)
MONOCYTES NFR BLD AUTO: 9 %
NEUTROPHILS # BLD AUTO: 3.16 X10 (3) UL (ref 1.5–7.7)
NEUTROPHILS # BLD AUTO: 3.16 X10(3) UL (ref 1.5–7.7)
NEUTROPHILS NFR BLD AUTO: 53.6 %
NONHDLC SERPL-MCNC: 89 MG/DL (ref ?–130)
OSMOLALITY SERPL CALC.SUM OF ELEC: 285 MOSM/KG (ref 275–295)
PLATELET # BLD AUTO: 314 10(3)UL (ref 150–450)
POTASSIUM SERPL-SCNC: 4.3 MMOL/L (ref 3.5–5.1)
PROT SERPL-MCNC: 7.4 G/DL (ref 6.4–8.2)
RBC # BLD AUTO: 4.65 X10(6)UL
SODIUM SERPL-SCNC: 138 MMOL/L (ref 136–145)
TRIGL SERPL-MCNC: 147 MG/DL (ref 30–149)
VLDLC SERPL CALC-MCNC: 22 MG/DL (ref 0–30)
WBC # BLD AUTO: 5.9 X10(3) UL (ref 4–11)

## 2023-04-05 PROCEDURE — 82728 ASSAY OF FERRITIN: CPT | Performed by: INTERNAL MEDICINE

## 2023-04-05 PROCEDURE — 85025 COMPLETE CBC W/AUTO DIFF WBC: CPT | Performed by: INTERNAL MEDICINE

## 2023-04-05 PROCEDURE — 3008F BODY MASS INDEX DOCD: CPT | Performed by: INTERNAL MEDICINE

## 2023-04-05 PROCEDURE — 80053 COMPREHEN METABOLIC PANEL: CPT | Performed by: INTERNAL MEDICINE

## 2023-04-05 PROCEDURE — 80061 LIPID PANEL: CPT | Performed by: INTERNAL MEDICINE

## 2023-04-05 PROCEDURE — 3079F DIAST BP 80-89 MM HG: CPT | Performed by: INTERNAL MEDICINE

## 2023-04-05 PROCEDURE — 99396 PREV VISIT EST AGE 40-64: CPT | Performed by: INTERNAL MEDICINE

## 2023-04-05 PROCEDURE — 3075F SYST BP GE 130 - 139MM HG: CPT | Performed by: INTERNAL MEDICINE

## 2023-04-06 NOTE — TELEPHONE ENCOUNTER
From: Elizabet Saleh  To: Muna Gill MD  Sent: 4/5/2023 5:38 PM CDT  Subject: Hand specialist    Dr. Mina Back had mentioned rendering me to a hand specialist at today's well visit. I don't see anything in the after visit summary. Can you please get me that information?

## 2023-04-07 VITALS
DIASTOLIC BLOOD PRESSURE: 88 MMHG | HEIGHT: 64 IN | HEART RATE: 84 BPM | TEMPERATURE: 98 F | SYSTOLIC BLOOD PRESSURE: 138 MMHG | BODY MASS INDEX: 48.23 KG/M2 | WEIGHT: 282.5 LBS | RESPIRATION RATE: 20 BRPM | OXYGEN SATURATION: 95 %

## 2023-04-21 RX ORDER — MULTIVIT-MIN/IRON/FOLIC ACID/K 18-600-40
1 CAPSULE ORAL DAILY
COMMUNITY

## 2023-04-21 RX ORDER — PNV NO.95/FERROUS FUM/FOLIC AC 28MG-0.8MG
1 TABLET ORAL DAILY
COMMUNITY

## 2023-05-09 ENCOUNTER — ANESTHESIA EVENT (OUTPATIENT)
Dept: ENDOSCOPY | Facility: HOSPITAL | Age: 53
End: 2023-05-09
Payer: COMMERCIAL

## 2023-05-09 ENCOUNTER — HOSPITAL ENCOUNTER (OUTPATIENT)
Facility: HOSPITAL | Age: 53
Setting detail: HOSPITAL OUTPATIENT SURGERY
Discharge: HOME | End: 2023-05-09
Attending: INTERNAL MEDICINE | Admitting: INTERNAL MEDICINE
Payer: COMMERCIAL

## 2023-05-09 ENCOUNTER — ANESTHESIA (OUTPATIENT)
Dept: ENDOSCOPY | Facility: HOSPITAL | Age: 53
End: 2023-05-09
Payer: COMMERCIAL

## 2023-05-09 VITALS
SYSTOLIC BLOOD PRESSURE: 158 MMHG | TEMPERATURE: 98 F | OXYGEN SATURATION: 100 % | WEIGHT: 285 LBS | RESPIRATION RATE: 24 BRPM | HEIGHT: 64 IN | BODY MASS INDEX: 48.65 KG/M2 | HEART RATE: 72 BPM | DIASTOLIC BLOOD PRESSURE: 88 MMHG

## 2023-05-09 DIAGNOSIS — K22.2 SCHATZKI'S RING OF DISTAL ESOPHAGUS: ICD-10-CM

## 2023-05-09 DIAGNOSIS — K21.00 GASTROESOPHAGEAL REFLUX DISEASE WITH ESOPHAGITIS WITHOUT HEMORRHAGE: ICD-10-CM

## 2023-05-09 DIAGNOSIS — R13.19 ESOPHAGEAL DYSPHAGIA: ICD-10-CM

## 2023-05-09 PROCEDURE — 0D758ZZ DILATION OF ESOPHAGUS, VIA NATURAL OR ARTIFICIAL OPENING ENDOSCOPIC: ICD-10-PCS | Performed by: INTERNAL MEDICINE

## 2023-05-09 PROCEDURE — 88342 IMHCHEM/IMCYTCHM 1ST ANTB: CPT | Performed by: INTERNAL MEDICINE

## 2023-05-09 PROCEDURE — 88305 TISSUE EXAM BY PATHOLOGIST: CPT | Performed by: INTERNAL MEDICINE

## 2023-05-09 PROCEDURE — 0DB78ZX EXCISION OF STOMACH, PYLORUS, VIA NATURAL OR ARTIFICIAL OPENING ENDOSCOPIC, DIAGNOSTIC: ICD-10-PCS | Performed by: INTERNAL MEDICINE

## 2023-05-09 PROCEDURE — 0DB48ZX EXCISION OF ESOPHAGOGASTRIC JUNCTION, VIA NATURAL OR ARTIFICIAL OPENING ENDOSCOPIC, DIAGNOSTIC: ICD-10-PCS | Performed by: INTERNAL MEDICINE

## 2023-05-09 PROCEDURE — 0DB58ZX EXCISION OF ESOPHAGUS, VIA NATURAL OR ARTIFICIAL OPENING ENDOSCOPIC, DIAGNOSTIC: ICD-10-PCS | Performed by: INTERNAL MEDICINE

## 2023-05-09 RX ORDER — LIDOCAINE HYDROCHLORIDE 10 MG/ML
INJECTION, SOLUTION EPIDURAL; INFILTRATION; INTRACAUDAL; PERINEURAL AS NEEDED
Status: DISCONTINUED | OUTPATIENT
Start: 2023-05-09 | End: 2023-05-09 | Stop reason: SURG

## 2023-05-09 RX ORDER — NALOXONE HYDROCHLORIDE 0.4 MG/ML
80 INJECTION, SOLUTION INTRAMUSCULAR; INTRAVENOUS; SUBCUTANEOUS AS NEEDED
Status: DISCONTINUED | OUTPATIENT
Start: 2023-05-09 | End: 2023-05-09

## 2023-05-09 RX ORDER — ONDANSETRON 2 MG/ML
4 INJECTION INTRAMUSCULAR; INTRAVENOUS AS NEEDED
Status: DISCONTINUED | OUTPATIENT
Start: 2023-05-09 | End: 2023-05-09

## 2023-05-09 RX ORDER — SODIUM CHLORIDE, SODIUM LACTATE, POTASSIUM CHLORIDE, CALCIUM CHLORIDE 600; 310; 30; 20 MG/100ML; MG/100ML; MG/100ML; MG/100ML
INJECTION, SOLUTION INTRAVENOUS CONTINUOUS
Status: DISCONTINUED | OUTPATIENT
Start: 2023-05-09 | End: 2023-05-09

## 2023-05-09 RX ADMIN — LIDOCAINE HYDROCHLORIDE 30 MG: 10 INJECTION, SOLUTION EPIDURAL; INFILTRATION; INTRACAUDAL; PERINEURAL at 09:58:00

## 2023-05-09 RX ADMIN — SODIUM CHLORIDE, SODIUM LACTATE, POTASSIUM CHLORIDE, CALCIUM CHLORIDE: 600; 310; 30; 20 INJECTION, SOLUTION INTRAVENOUS at 10:12:00

## 2023-05-09 RX ADMIN — SODIUM CHLORIDE, SODIUM LACTATE, POTASSIUM CHLORIDE, CALCIUM CHLORIDE: 600; 310; 30; 20 INJECTION, SOLUTION INTRAVENOUS at 09:50:00

## 2023-05-09 NOTE — ANESTHESIA POSTPROCEDURE EVALUATION
[address] [facility] Patient Status:  Hospital Outpatient Surgery   Age/Gender 46year old female MRN [id number]   Location [address] [address] Attending Philippe Whalen MD   [facility] Day # 0 PCP Leticia Martinez MD       Anesthesia Post-op Note    ESOPHAGOGASTRODUODENOSCOPY (EGD) with biopsies and balloon dilation to20mm    Procedure Summary     Date: 05/09/23 Room / Location: [facility] ENDOSCOPY 02 / 1404 [facility] ENDOSCOPY    Anesthesia Start: 4485 Anesthesia Stop: 5540    Procedure: ESOPHAGOGASTRODUODENOSCOPY (EGD) with biopsies and balloon dilation to20mm Diagnosis:       Esophageal dysphagia      Schatzki's ring of distal esophagus      Gastroesophageal reflux disease with esophagitis without hemorrhage      (gastritis,hiatal hernia, irregular zline)    Surgeons: Philippe Whalen MD Anesthesiologist: Ольга Mittal MD    Anesthesia Type: MAC ASA Status: 3          Anesthesia Type: MAC    Vitals Value Taken Time   /88 05/09/23 1012   Temp  05/09/23 1012   Pulse 70 05/09/23 1012   Resp 16 05/09/23 1012   SpO2 98 % 05/09/23 1012   Vitals shown include unvalidated device data.     Patient Location: Endoscopy    Anesthesia Type: MAC    Airway Patency: patent    Postop Pain Control: adequate    Mental Status: mildly sedated but able to meaningfully participate in the post-anesthesia evaluation    Nausea/Vomiting: none    Cardiopulmonary/Hydration status: stable euvolemic    Complications: no apparent anesthesia related complications    Postop vital signs: stable    Dental Exam: Unchanged from Postop

## 2023-05-22 NOTE — PROGRESS NOTES
Date: 2023    To: Vira Hoang  : 1970     I hope this letter finds you doing well. I am writing to inform you of the following: The biopsies obtained at the time of your recent endoscopic procedure were benign and showed no evidence of infection or malignancy. Please call the office at [phone] if there are any questions.     Jhonny Steve M.D.

## 2023-06-14 PROBLEM — M54.12 BRACHIAL NEURITIS: Status: ACTIVE | Noted: 2023-05-05

## 2023-06-14 PROBLEM — M54.12 CERVICAL RADICULOPATHY: Status: ACTIVE | Noted: 2023-05-05

## 2023-06-19 ENCOUNTER — LABORATORY ENCOUNTER (OUTPATIENT)
Dept: LAB | Age: 53
End: 2023-06-19
Attending: INTERNAL MEDICINE
Payer: COMMERCIAL

## 2023-06-19 DIAGNOSIS — R74.8 ELEVATED LIVER ENZYMES: ICD-10-CM

## 2023-06-19 LAB
ALBUMIN SERPL-MCNC: 3.7 G/DL (ref 3.4–5)
ALBUMIN/GLOB SERPL: 1 {RATIO} (ref 1–2)
ALP LIVER SERPL-CCNC: 62 U/L
ALT SERPL-CCNC: 67 U/L
ANION GAP SERPL CALC-SCNC: 7 MMOL/L (ref 0–18)
AST SERPL-CCNC: 49 U/L (ref 15–37)
BILIRUB SERPL-MCNC: 0.6 MG/DL (ref 0.1–2)
BUN BLD-MCNC: 13 MG/DL (ref 7–18)
CALCIUM BLD-MCNC: 9.3 MG/DL (ref 8.5–10.1)
CHLORIDE SERPL-SCNC: 104 MMOL/L (ref 98–112)
CO2 SERPL-SCNC: 27 MMOL/L (ref 21–32)
CREAT BLD-MCNC: 0.72 MG/DL
FASTING STATUS PATIENT QL REPORTED: NO
GFR SERPLBLD BASED ON 1.73 SQ M-ARVRAT: 101 ML/MIN/1.73M2 (ref 60–?)
GLOBULIN PLAS-MCNC: 3.8 G/DL (ref 2.8–4.4)
GLUCOSE BLD-MCNC: 83 MG/DL (ref 70–99)
OSMOLALITY SERPL CALC.SUM OF ELEC: 285 MOSM/KG (ref 275–295)
POTASSIUM SERPL-SCNC: 3.9 MMOL/L (ref 3.5–5.1)
PROT SERPL-MCNC: 7.5 G/DL (ref 6.4–8.2)
SODIUM SERPL-SCNC: 138 MMOL/L (ref 136–145)

## 2023-06-19 PROCEDURE — 80053 COMPREHEN METABOLIC PANEL: CPT

## 2023-06-19 PROCEDURE — 36415 COLL VENOUS BLD VENIPUNCTURE: CPT

## 2023-06-20 ENCOUNTER — OFFICE VISIT (OUTPATIENT)
Dept: FAMILY MEDICINE CLINIC | Facility: CLINIC | Age: 53
End: 2023-06-20
Payer: COMMERCIAL

## 2023-06-20 VITALS
RESPIRATION RATE: 24 BRPM | TEMPERATURE: 98 F | BODY MASS INDEX: 49 KG/M2 | SYSTOLIC BLOOD PRESSURE: 138 MMHG | WEIGHT: 287.5 LBS | OXYGEN SATURATION: 96 % | DIASTOLIC BLOOD PRESSURE: 88 MMHG | HEART RATE: 77 BPM

## 2023-06-20 DIAGNOSIS — I10 PRIMARY HYPERTENSION: Primary | ICD-10-CM

## 2023-06-20 PROCEDURE — 3075F SYST BP GE 130 - 139MM HG: CPT | Performed by: INTERNAL MEDICINE

## 2023-06-20 PROCEDURE — 99214 OFFICE O/P EST MOD 30 MIN: CPT | Performed by: INTERNAL MEDICINE

## 2023-06-20 PROCEDURE — 3079F DIAST BP 80-89 MM HG: CPT | Performed by: INTERNAL MEDICINE

## 2023-06-20 RX ORDER — LISINOPRIL 10 MG/1
10 TABLET ORAL DAILY
Qty: 90 TABLET | Refills: 0 | Status: SHIPPED | OUTPATIENT
Start: 2023-06-20 | End: 2024-06-14

## 2023-06-22 ENCOUNTER — LABORATORY ENCOUNTER (OUTPATIENT)
Dept: LAB | Age: 53
End: 2023-06-22
Payer: COMMERCIAL

## 2023-06-22 DIAGNOSIS — R74.8 ELEVATED LIVER ENZYMES: ICD-10-CM

## 2023-06-22 LAB
A1AT SERPL-MCNC: 99 MG/DL (ref 90–200)
CERULOPLASMIN SERPL-MCNC: 26.8 MG/DL (ref 20–60)
DEPRECATED HBV CORE AB SER IA-ACNC: 29.9 NG/ML
HAV AB SER QL IA: NONREACTIVE
HBV CORE AB SERPL QL IA: NONREACTIVE
HBV SURFACE AB SER QL: NONREACTIVE
HBV SURFACE AB SERPL IA-ACNC: <3.1 MIU/ML
HBV SURFACE AG SER-ACNC: <0.1 [IU]/L
HBV SURFACE AG SERPL QL IA: NONREACTIVE
HCV AB SERPL QL IA: NONREACTIVE
IRON SATN MFR SERPL: 23 %
IRON SERPL-MCNC: 96 UG/DL
TIBC SERPL-MCNC: 425 UG/DL (ref 240–450)
TRANSFERRIN SERPL-MCNC: 285 MG/DL (ref 200–360)

## 2023-06-22 PROCEDURE — 83540 ASSAY OF IRON: CPT

## 2023-06-22 PROCEDURE — 86803 HEPATITIS C AB TEST: CPT

## 2023-06-22 PROCEDURE — 86038 ANTINUCLEAR ANTIBODIES: CPT

## 2023-06-22 PROCEDURE — 82103 ALPHA-1-ANTITRYPSIN TOTAL: CPT

## 2023-06-22 PROCEDURE — 83550 IRON BINDING TEST: CPT

## 2023-06-22 PROCEDURE — 86708 HEPATITIS A ANTIBODY: CPT

## 2023-06-22 PROCEDURE — 87340 HEPATITIS B SURFACE AG IA: CPT

## 2023-06-22 PROCEDURE — 82390 ASSAY OF CERULOPLASMIN: CPT

## 2023-06-22 PROCEDURE — 82728 ASSAY OF FERRITIN: CPT

## 2023-06-22 PROCEDURE — 83516 IMMUNOASSAY NONANTIBODY: CPT

## 2023-06-22 PROCEDURE — 86704 HEP B CORE ANTIBODY TOTAL: CPT

## 2023-06-22 PROCEDURE — 86039 ANTINUCLEAR ANTIBODIES (ANA): CPT

## 2023-06-22 PROCEDURE — 86706 HEP B SURFACE ANTIBODY: CPT

## 2023-06-22 PROCEDURE — 36415 COLL VENOUS BLD VENIPUNCTURE: CPT

## 2023-06-24 LAB
ACTIN SMOOTH MUSCLE AB: 9 UNITS
M2 MITOCHONDRIAL AB: <20 UNITS

## 2023-06-26 LAB — NUCLEAR IGG TITR SER IF: POSITIVE {TITER}

## 2023-06-27 LAB — ANA NUCLEOLAR TITR SER IF: 160 {TITER}

## 2023-06-28 ENCOUNTER — TELEPHONE (OUTPATIENT)
Dept: FAMILY MEDICINE CLINIC | Facility: CLINIC | Age: 53
End: 2023-06-28

## 2023-06-28 DIAGNOSIS — R76.8 ANA POSITIVE: Primary | ICD-10-CM

## 2023-06-30 ENCOUNTER — TELEPHONE (OUTPATIENT)
Dept: FAMILY MEDICINE CLINIC | Facility: CLINIC | Age: 53
End: 2023-06-30

## 2023-07-01 ENCOUNTER — PATIENT MESSAGE (OUTPATIENT)
Dept: FAMILY MEDICINE CLINIC | Facility: CLINIC | Age: 53
End: 2023-07-01

## 2023-07-19 ENCOUNTER — OFFICE VISIT (OUTPATIENT)
Dept: FAMILY MEDICINE CLINIC | Facility: CLINIC | Age: 53
End: 2023-07-19
Payer: COMMERCIAL

## 2023-07-19 ENCOUNTER — LABORATORY ENCOUNTER (OUTPATIENT)
Dept: LAB | Age: 53
End: 2023-07-19
Attending: INTERNAL MEDICINE
Payer: COMMERCIAL

## 2023-07-19 VITALS
BODY MASS INDEX: 49 KG/M2 | SYSTOLIC BLOOD PRESSURE: 126 MMHG | TEMPERATURE: 98 F | WEIGHT: 287.25 LBS | OXYGEN SATURATION: 96 % | DIASTOLIC BLOOD PRESSURE: 74 MMHG | HEART RATE: 80 BPM | RESPIRATION RATE: 22 BRPM

## 2023-07-19 DIAGNOSIS — M25.551 BILATERAL HIP PAIN: ICD-10-CM

## 2023-07-19 DIAGNOSIS — M25.552 BILATERAL HIP PAIN: ICD-10-CM

## 2023-07-19 DIAGNOSIS — R76.8 ANA POSITIVE: ICD-10-CM

## 2023-07-19 DIAGNOSIS — M54.40 CHRONIC BILATERAL LOW BACK PAIN WITH SCIATICA, SCIATICA LATERALITY UNSPECIFIED: Primary | ICD-10-CM

## 2023-07-19 DIAGNOSIS — G89.29 CHRONIC BILATERAL LOW BACK PAIN WITH SCIATICA, SCIATICA LATERALITY UNSPECIFIED: Primary | ICD-10-CM

## 2023-07-19 PROCEDURE — 3078F DIAST BP <80 MM HG: CPT | Performed by: INTERNAL MEDICINE

## 2023-07-19 PROCEDURE — 99214 OFFICE O/P EST MOD 30 MIN: CPT | Performed by: INTERNAL MEDICINE

## 2023-07-19 PROCEDURE — 86225 DNA ANTIBODY NATIVE: CPT | Performed by: INTERNAL MEDICINE

## 2023-07-19 PROCEDURE — 86038 ANTINUCLEAR ANTIBODIES: CPT | Performed by: INTERNAL MEDICINE

## 2023-07-19 PROCEDURE — 3074F SYST BP LT 130 MM HG: CPT | Performed by: INTERNAL MEDICINE

## 2023-07-19 RX ORDER — METHOCARBAMOL 500 MG/1
500 TABLET, FILM COATED ORAL 4 TIMES DAILY
Qty: 90 TABLET | Refills: 0 | Status: SHIPPED | OUTPATIENT
Start: 2023-07-19 | End: 2023-08-18

## 2023-07-19 RX ORDER — LOSARTAN POTASSIUM 25 MG/1
25 TABLET ORAL DAILY
Qty: 90 TABLET | Refills: 0 | Status: SHIPPED | OUTPATIENT
Start: 2023-07-19 | End: 2023-10-17

## 2023-07-20 ENCOUNTER — HOSPITAL ENCOUNTER (OUTPATIENT)
Dept: ULTRASOUND IMAGING | Age: 53
Discharge: HOME | End: 2023-07-20
Payer: COMMERCIAL

## 2023-07-20 DIAGNOSIS — R74.8 ELEVATED LIVER ENZYMES: ICD-10-CM

## 2023-07-20 LAB
DSDNA IGG SERPL IA-ACNC: 0.9 IU/ML
ENA AB SER QL IA: 0.1 UG/L
ENA AB SER QL IA: NEGATIVE

## 2023-07-20 PROCEDURE — 76700 US EXAM ABDOM COMPLETE: CPT

## 2023-08-04 ENCOUNTER — MED REC SCAN ONLY (OUTPATIENT)
Dept: FAMILY MEDICINE CLINIC | Facility: CLINIC | Age: 53
End: 2023-08-04

## 2023-08-10 RX ORDER — SPIRONOLACTONE 25 MG/1
25 TABLET ORAL DAILY
Qty: 90 TABLET | Refills: 2 | Status: SHIPPED | OUTPATIENT
Start: 2023-08-10

## 2023-08-10 NOTE — TELEPHONE ENCOUNTER
SPIRONOLACTONE 25 MG Oral Tab     Hypertension Medications Protocol Emaxgy31/10/2023 12:48 AM   Protocol Details CMP or BMP in past 12 months    Last serum creatinine< 2.0    Appointment in past 6 or next 3 months      Last office visit:  6/20/23    No future appointments.   Last filled:  Nuha Smith  Last labs:  4/5/23

## 2023-08-24 ENCOUNTER — LABORATORY ENCOUNTER (OUTPATIENT)
Dept: LAB | Age: 53
End: 2023-08-24
Payer: COMMERCIAL

## 2023-08-24 DIAGNOSIS — K76.0 FATTY LIVER: ICD-10-CM

## 2023-08-24 DIAGNOSIS — R74.8 ELEVATED LIVER ENZYMES: ICD-10-CM

## 2023-08-24 LAB
ALBUMIN SERPL-MCNC: 3.8 G/DL (ref 3.4–5)
ALP LIVER SERPL-CCNC: 55 U/L
ALT SERPL-CCNC: 59 U/L
AST SERPL-CCNC: 40 U/L (ref 15–37)
BILIRUB DIRECT SERPL-MCNC: 0.1 MG/DL (ref 0–0.2)
BILIRUB SERPL-MCNC: 0.5 MG/DL (ref 0.1–2)
PROT SERPL-MCNC: 7.1 G/DL (ref 6.4–8.2)

## 2023-08-24 PROCEDURE — 80076 HEPATIC FUNCTION PANEL: CPT

## 2023-09-05 RX ORDER — LOSARTAN POTASSIUM 25 MG/1
25 TABLET ORAL DAILY
Qty: 90 TABLET | Refills: 0 | Status: SHIPPED | OUTPATIENT
Start: 2023-09-05 | End: 2023-12-04

## 2023-09-05 NOTE — TELEPHONE ENCOUNTER
Losartan 25 MG oral tab    Hypertension Medications Protocol Zvfvve6309/03/2023 02:46 PM   Protocol Details CMP or BMP in past 12 months    Last serum creatinine< 2.0    Appointment in past 6 or next 3 months        Last office visit: 6/20/23     Future Appointments   Date Time Provider Eladia Nunes   11/16/2023 10:20 AM Saqib Haro MD [facility] [address]   Last filled:  7/19/23  #90 with 0 refills   Last labs:   4/5/23  Crea:  0.93

## 2023-09-09 RX ORDER — METHOCARBAMOL 500 MG/1
500 TABLET, FILM COATED ORAL 4 TIMES DAILY
Qty: 90 TABLET | Refills: 0 | Status: SHIPPED | OUTPATIENT
Start: 2023-09-09

## 2023-09-09 NOTE — TELEPHONE ENCOUNTER
Last office visit: 7/19/23  Last refill: 8/15/23  Last labs: 4/5/23  Future Appointments   Date Time Provider Eladia uNnes   11/16/2023 10:20 AM Aniceto Howard MD [facility] [address]

## 2023-09-11 ENCOUNTER — MED REC SCAN ONLY (OUTPATIENT)
Dept: FAMILY MEDICINE CLINIC | Facility: CLINIC | Age: 53
End: 2023-09-11

## 2023-10-07 NOTE — TELEPHONE ENCOUNTER
Last OV 07/19  Last refill 09/09 #90    Requested Prescriptions     Pending Prescriptions Disp Refills    METHOCARBAMOL 500 MG Oral Tab [Pharmacy Med Name: METHOCARBAMOL 500 MG TABLET] 90 tablet 0     Sig: TAKE 1 TABLET BY MOUTH 4 TIMES DAILY.      Future Appointments   Date Time Provider Eladia Nunes   11/16/2023 10:20 AM Kimmy Chi MD [facility] [address]   1/10/2024 12:00 PM Chantel Buchanan MD [facility] [address]

## 2023-10-08 RX ORDER — METHOCARBAMOL 500 MG/1
500 TABLET, FILM COATED ORAL 4 TIMES DAILY
Qty: 90 TABLET | Refills: 0 | Status: SHIPPED | OUTPATIENT
Start: 2023-10-08

## 2023-10-16 RX ORDER — METHOCARBAMOL 750 MG/1
750 TABLET, FILM COATED ORAL 2 TIMES DAILY
Qty: 180 TABLET | Refills: 0 | OUTPATIENT
Start: 2023-10-16 | End: 2024-01-14

## 2023-10-16 RX ORDER — ATORVASTATIN CALCIUM 10 MG/1
TABLET, FILM COATED ORAL
Qty: 90 TABLET | Refills: 3 | Status: SHIPPED | OUTPATIENT
Start: 2023-10-16

## 2023-10-16 NOTE — TELEPHONE ENCOUNTER
Last office visit:  6/20/23      Future Appointments   Date Time Provider Eladia Anguloi   11/16/2023 10:20 AM Heather Lerma MD [facility] [address]   1/10/2024 12:00 PM Siobhan Buchanan MD [facility] [address]     Atorvastatin 10 mg oral tab    Cholesterol Medication Protocol Oivptn39/15/2023 10:27 AM   Protocol Details ALT < 80    ALT resulted within past year    Lipid panel within past 12 months    Appointment within past 12 or next 3 months      Last filled:  11/15/22  #90 with 3 refills   Last labs:   Last 8/24/23  ALT:  59  Lipid: 4/5/23    Methocarbamol 750 mg oral tab  Last filled:  10/8/23  #90 with 0 refills   Last labs:  4/5/23

## 2023-10-25 ENCOUNTER — TELEPHONE (OUTPATIENT)
Dept: FAMILY MEDICINE CLINIC | Facility: CLINIC | Age: 53
End: 2023-10-25

## 2023-10-25 DIAGNOSIS — E61.1 IRON DEFICIENCY: Primary | ICD-10-CM

## 2023-10-25 NOTE — TELEPHONE ENCOUNTER
HAS BEEN TAKING OTC IRON, SHE NEEDS TO BUY A NEW BOTTLE, DOES DR MEDRANO WANT HER TO GET LABS DONE FIRST, STOP TAKING IT, OR CONTINUE TAKING IT? CALL PT

## 2023-10-31 ENCOUNTER — LABORATORY ENCOUNTER (OUTPATIENT)
Dept: LAB | Age: 53
End: 2023-10-31
Attending: INTERNAL MEDICINE
Payer: COMMERCIAL

## 2023-10-31 DIAGNOSIS — E61.1 IRON DEFICIENCY: ICD-10-CM

## 2023-10-31 LAB
BASOPHILS # BLD AUTO: 0.06 X10(3) UL (ref 0–0.2)
BASOPHILS NFR BLD AUTO: 1.1 %
DEPRECATED HBV CORE AB SER IA-ACNC: 92.2 NG/ML
EOSINOPHIL # BLD AUTO: 0.11 X10(3) UL (ref 0–0.7)
EOSINOPHIL NFR BLD AUTO: 2 %
ERYTHROCYTE [DISTWIDTH] IN BLOOD BY AUTOMATED COUNT: 12.7 %
HCT VFR BLD AUTO: 40 %
HGB BLD-MCNC: 13.2 G/DL
IMM GRANULOCYTES # BLD AUTO: 0.06 X10(3) UL (ref 0–1)
IMM GRANULOCYTES NFR BLD: 1.1 %
IRON SATN MFR SERPL: 23 %
IRON SERPL-MCNC: 93 UG/DL
LYMPHOCYTES # BLD AUTO: 1.98 X10(3) UL (ref 1–4)
LYMPHOCYTES NFR BLD AUTO: 35.1 %
MCH RBC QN AUTO: 29.9 PG (ref 26–34)
MCHC RBC AUTO-ENTMCNC: 33 G/DL (ref 31–37)
MCV RBC AUTO: 90.5 FL
MONOCYTES # BLD AUTO: 0.46 X10(3) UL (ref 0.1–1)
MONOCYTES NFR BLD AUTO: 8.2 %
NEUTROPHILS # BLD AUTO: 2.97 X10 (3) UL (ref 1.5–7.7)
NEUTROPHILS # BLD AUTO: 2.97 X10(3) UL (ref 1.5–7.7)
NEUTROPHILS NFR BLD AUTO: 52.5 %
PLATELET # BLD AUTO: 223 10(3)UL (ref 150–450)
RBC # BLD AUTO: 4.42 X10(6)UL
TIBC SERPL-MCNC: 407 UG/DL (ref 240–450)
TRANSFERRIN SERPL-MCNC: 273 MG/DL (ref 200–360)
WBC # BLD AUTO: 5.6 X10(3) UL (ref 4–11)

## 2023-10-31 PROCEDURE — 83540 ASSAY OF IRON: CPT | Performed by: INTERNAL MEDICINE

## 2023-10-31 PROCEDURE — 82728 ASSAY OF FERRITIN: CPT | Performed by: INTERNAL MEDICINE

## 2023-10-31 PROCEDURE — 83550 IRON BINDING TEST: CPT | Performed by: INTERNAL MEDICINE

## 2023-10-31 PROCEDURE — 85025 COMPLETE CBC W/AUTO DIFF WBC: CPT | Performed by: INTERNAL MEDICINE

## 2023-11-01 RX ORDER — GABAPENTIN 100 MG/1
100 CAPSULE ORAL 2 TIMES DAILY
Qty: 180 CAPSULE | Refills: 3 | Status: SHIPPED | OUTPATIENT
Start: 2023-11-01 | End: 2024-10-26

## 2023-11-01 NOTE — TELEPHONE ENCOUNTER
Patient requesting refill while on phone call for lab results. OV 07/19  REFILL 02/08 - external    Requested Prescriptions     Pending Prescriptions Disp Refills    gabapentin 100 MG Oral Cap  0     Sig: Take 1 capsule (100 mg total) by mouth 2 (two) times daily.      Future Appointments   Date Time Provider Eladia Nunes   11/16/2023 10:20 AM Trevon Gupta MD [facility] [address]   1/10/2024 12:00 PM Marlene Buchanan MD [facility] [address]

## 2023-11-13 RX ORDER — METHOCARBAMOL 500 MG/1
500 TABLET, FILM COATED ORAL 4 TIMES DAILY
Qty: 90 TABLET | Refills: 0 | Status: SHIPPED | OUTPATIENT
Start: 2023-11-13

## 2023-11-13 RX ORDER — LOSARTAN POTASSIUM 25 MG/1
25 TABLET ORAL DAILY
Qty: 90 TABLET | Refills: 0 | Status: SHIPPED | OUTPATIENT
Start: 2023-11-13 | End: 2024-02-11

## 2023-11-13 NOTE — TELEPHONE ENCOUNTER
Requested Prescriptions     Pending Prescriptions Disp Refills    METHOCARBAMOL 500 MG Oral Tab [Pharmacy Med Name: METHOCARBAMOL 500 MG TABLET] 90 tablet 0     Sig: TAKE 1 TABLET BY MOUTH 4 TIMES DAILY.      Last refill 10/8/23 #90  LOV 7/19/23  Future Appointments   Date Time Provider Eladia Nunes   11/16/2023 10:20 AM Katharina Sung MD [facility] [address]   1/10/2024 12:00 PM Tanja Bucahnan MD [facility] [address]

## 2023-11-13 NOTE — TELEPHONE ENCOUNTER
Losartan 25 MG oral tab    Hypertension Medications Protocol Jwhqjs9311/13/2023 11:35 AM   Protocol Details CMP or BMP in past 12 months    Last serum creatinine< 2.0    Appointment in past 6 or next 3 months        Last office visit:  6/20/23    Future Appointments   Date Time Provider Eladia Nunes   11/16/2023 10:20 AM Mena Huerta MD [facility] [address]   1/10/2024 12:00 PM Katey Buchanan MD [facility] [address]   Last filled:  9/5/23  #90 with 0 refills   Last labs:  6/19/23  Crea:  0.72

## 2023-11-16 ENCOUNTER — LAB ENCOUNTER (OUTPATIENT)
Dept: LAB | Age: 53
End: 2023-11-16
Attending: INTERNAL MEDICINE
Payer: COMMERCIAL

## 2023-11-16 ENCOUNTER — OFFICE VISIT (OUTPATIENT)
Dept: INTERNAL MEDICINE CLINIC | Facility: CLINIC | Age: 53
End: 2023-11-16
Payer: COMMERCIAL

## 2023-11-16 VITALS
RESPIRATION RATE: 18 BRPM | BODY MASS INDEX: 50.02 KG/M2 | SYSTOLIC BLOOD PRESSURE: 134 MMHG | OXYGEN SATURATION: 94 % | DIASTOLIC BLOOD PRESSURE: 70 MMHG | HEIGHT: 64 IN | WEIGHT: 293 LBS | HEART RATE: 80 BPM

## 2023-11-16 DIAGNOSIS — Z51.81 THERAPEUTIC DRUG MONITORING: ICD-10-CM

## 2023-11-16 DIAGNOSIS — K21.00 GASTROESOPHAGEAL REFLUX DISEASE WITH ESOPHAGITIS WITHOUT HEMORRHAGE: ICD-10-CM

## 2023-11-16 DIAGNOSIS — E78.1 HYPERTRIGLYCERIDEMIA: ICD-10-CM

## 2023-11-16 DIAGNOSIS — E66.01 OBESITY, CLASS III, BMI 40-49.9 (MORBID OBESITY) (HCC): ICD-10-CM

## 2023-11-16 DIAGNOSIS — Z51.81 THERAPEUTIC DRUG MONITORING: Primary | ICD-10-CM

## 2023-11-16 DIAGNOSIS — R73.03 PREDIABETES: ICD-10-CM

## 2023-11-16 LAB
EST. AVERAGE GLUCOSE BLD GHB EST-MCNC: 126 MG/DL (ref 68–126)
HBA1C MFR BLD: 6 % (ref ?–5.7)
VIT D+METAB SERPL-MCNC: 30.1 NG/ML (ref 30–100)

## 2023-11-16 PROCEDURE — 82306 VITAMIN D 25 HYDROXY: CPT | Performed by: INTERNAL MEDICINE

## 2023-11-16 PROCEDURE — 83036 HEMOGLOBIN GLYCOSYLATED A1C: CPT | Performed by: INTERNAL MEDICINE

## 2023-11-16 PROCEDURE — 3078F DIAST BP <80 MM HG: CPT | Performed by: INTERNAL MEDICINE

## 2023-11-16 PROCEDURE — 99214 OFFICE O/P EST MOD 30 MIN: CPT | Performed by: INTERNAL MEDICINE

## 2023-11-16 PROCEDURE — 3008F BODY MASS INDEX DOCD: CPT | Performed by: INTERNAL MEDICINE

## 2023-11-16 PROCEDURE — 3075F SYST BP GE 130 - 139MM HG: CPT | Performed by: INTERNAL MEDICINE

## 2023-11-16 RX ORDER — LISINOPRIL 10 MG/1
10 TABLET ORAL DAILY
COMMUNITY
Start: 2023-06-20 | End: 2023-11-16 | Stop reason: ALTCHOICE

## 2023-12-05 ENCOUNTER — TELEPHONE (OUTPATIENT)
Dept: INTERNAL MEDICINE CLINIC | Facility: CLINIC | Age: 53
End: 2023-12-05

## 2023-12-05 NOTE — TELEPHONE ENCOUNTER
NO coverage for PA on mounro.  It will not go through for prediabetes  I know its frustrating and for prevention of diabetes but insurance companies dont look at the big picture

## 2023-12-05 NOTE — TELEPHONE ENCOUNTER
Patient left message that Sweta Simms is not covered in her plan and she wants mounjaro. She needs a pa done. Patient is not diabetic and I did let her know in previously message that prediabetes does not work for [employer]. Please help.

## 2024-01-04 ENCOUNTER — OFFICE VISIT (OUTPATIENT)
Dept: INTERNAL MEDICINE CLINIC | Facility: CLINIC | Age: 54
End: 2024-01-04
Payer: COMMERCIAL

## 2024-01-04 VITALS
WEIGHT: 293 LBS | HEIGHT: 64 IN | DIASTOLIC BLOOD PRESSURE: 82 MMHG | RESPIRATION RATE: 16 BRPM | SYSTOLIC BLOOD PRESSURE: 130 MMHG | HEART RATE: 66 BPM | BODY MASS INDEX: 50.02 KG/M2

## 2024-01-04 DIAGNOSIS — K21.9 GASTROESOPHAGEAL REFLUX DISEASE, UNSPECIFIED WHETHER ESOPHAGITIS PRESENT: ICD-10-CM

## 2024-01-04 DIAGNOSIS — R73.03 PREDIABETES: ICD-10-CM

## 2024-01-04 DIAGNOSIS — E78.1 HYPERTRIGLYCERIDEMIA: ICD-10-CM

## 2024-01-04 DIAGNOSIS — E66.01 OBESITY, MORBID, BMI 50 OR HIGHER (HCC): Primary | ICD-10-CM

## 2024-01-04 DIAGNOSIS — Z51.81 THERAPEUTIC DRUG MONITORING: Primary | ICD-10-CM

## 2024-01-04 DIAGNOSIS — E66.01 OBESITY, CLASS III, BMI 40-49.9 (MORBID OBESITY) (HCC): ICD-10-CM

## 2024-01-04 DIAGNOSIS — K21.00 GASTROESOPHAGEAL REFLUX DISEASE WITH ESOPHAGITIS WITHOUT HEMORRHAGE: ICD-10-CM

## 2024-01-04 DIAGNOSIS — R03.0 ELEVATED BLOOD PRESSURE READING: ICD-10-CM

## 2024-01-04 PROBLEM — M54.16 LUMBAR RADICULOPATHY: Status: ACTIVE | Noted: 2023-10-26

## 2024-01-04 PROCEDURE — 3079F DIAST BP 80-89 MM HG: CPT | Performed by: INTERNAL MEDICINE

## 2024-01-04 PROCEDURE — 3075F SYST BP GE 130 - 139MM HG: CPT | Performed by: INTERNAL MEDICINE

## 2024-01-04 PROCEDURE — 99214 OFFICE O/P EST MOD 30 MIN: CPT | Performed by: INTERNAL MEDICINE

## 2024-01-04 PROCEDURE — 97802 MEDICAL NUTRITION INDIV IN: CPT | Performed by: DIETITIAN, REGISTERED

## 2024-01-04 PROCEDURE — 3008F BODY MASS INDEX DOCD: CPT | Performed by: INTERNAL MEDICINE

## 2024-01-04 NOTE — PROGRESS NOTES
HISTORY OF PRESENT ILLNESS  Chief Complaint   Patient presents with    Weight Check     -7 pounds        Danielle Mendosa is a 53 year old female here for follow up in medical weight loss program.     Denies chest pain, shortness of breath, dizziness, blurred vision, headache, paresthesia, nausea/vomiting.     Diagnosed with cervical and spinal stenosis   Diagnosed with spondylolisthesis   Has been trying to work towards avoiding soda again   Trying to get carbonation   Trying to avoidnight snacking   Seeing pain MD on Jan 29 and recommended to start on narcotics for movement.   Cannot have surgery until loses 100 lb         Wt Readings from Last 6 Encounters:   01/04/24 293 lb (132.9 kg)   11/16/23 300 lb (136.1 kg)   07/19/23 287 lb 4 oz (130.3 kg)   06/20/23 287 lb 8 oz (130.4 kg)   06/14/23 289 lb 3.2 oz (131.2 kg)   04/21/23 285 lb (129.3 kg)            Breakfast Lunch Dinner Snacks Fluids   Reviewed           REVIEW OF SYSTEMS  GENERAL HEALTH: feels well otherwise, denied any fevers chills or night sweats   RESPIRATORY: denies shortness of breath   CARDIOVASCULAR: denies chest pain  GI: denies abdominal pain    EXAM  /82   Pulse 66   Resp 16   Ht 5' 4\" (1.626 m)   Wt 293 lb (132.9 kg)   BMI 50.29 kg/m²   GENERAL: well developed, well nourished,in no apparent distress, A/O x3  SKIN: no rashes,no suspicious lesions  HEENT: atraumatic, normocephalic, OP-clear, PERRL  NECK: supple,no adenopathy  LUNGS: clear to auscultation bilaterally   CARDIO: RRR without murmur  GI: good BS's,NT/ND, no masses or HSM  EXTREMITIES: no cyanosis, no clubbing, no edema  EKG in office completed- Normal axis, NSR without any ST or T wave changes, Pulse Rate: 62 , qtc: 432     Lab Results   Component Value Date    WBC 5.6 10/31/2023    RBC 4.42 10/31/2023    HGB 13.2 10/31/2023    HCT 40.0 10/31/2023    MCV 90.5 10/31/2023    MCH 29.9 10/31/2023    MCHC 33.0 10/31/2023    RDW 12.7 10/31/2023    .0 10/31/2023     Lab  Results   Component Value Date    GLU 83 06/19/2023    BUN 13 06/19/2023    BUNCREA 14.5 02/02/2021    CREATSERUM 0.72 06/19/2023    ANIONGAP 7 06/19/2023    GFR 91 08/03/2017    GFRNAA 91 01/07/2022    GFRAA 105 01/07/2022    CA 9.3 06/19/2023    OSMOCALC 285 06/19/2023    ALKPHO 55 08/24/2023    AST 40 (H) 08/24/2023    ALT 59 (H) 08/24/2023    BILT 0.5 08/24/2023    TP 7.1 08/24/2023    ALB 3.8 08/24/2023    GLOBULIN 3.8 06/19/2023     06/19/2023    K 3.9 06/19/2023     06/19/2023    CO2 27.0 06/19/2023     Lab Results   Component Value Date     11/16/2023    A1C 6.0 (H) 11/16/2023     Lab Results   Component Value Date    CHOLEST 127 04/05/2023    TRIG 147 04/05/2023    HDL 38 (L) 04/05/2023    LDL 63 04/05/2023    VLDL 22 04/05/2023    TCHDLRATIO 5.16 (H) 08/03/2017    NONHDLC 89 04/05/2023     Lab Results   Component Value Date    T4F 1.1 01/07/2022    TSH 2.100 01/07/2022     Lab Results   Component Value Date    B12 247 01/07/2022     Lab Results   Component Value Date    VITD 30.1 11/16/2023       Current Outpatient Medications on File Prior to Visit   Medication Sig Dispense Refill    OMEPRAZOLE 40 MG Oral Capsule Delayed Release TAKE 1 CAPSULE (40 MG TOTAL) BY MOUTH DAILY. 90 capsule 2    methocarbamol 500 MG Oral Tab Take 1 tablet (500 mg total) by mouth 4 (four) times daily. 90 tablet 0    LOSARTAN 25 MG Oral Tab TAKE 1 TABLET (25 MG TOTAL) BY MOUTH DAILY. 90 tablet 0    gabapentin 100 MG Oral Cap Take 1 capsule (100 mg total) by mouth 2 (two) times daily. 180 capsule 3    ATORVASTATIN 10 MG Oral Tab TAKE 1 TABLET BY MOUTH EVERY DAY AT NIGHT 90 tablet 3    spironolactone 25 MG Oral Tab Take 1 tablet (25 mg total) by mouth daily. 90 tablet 2    B Complex-Folic Acid (SUPER B COMPLEX MAXI) Oral Tab Take 1 tablet by mouth daily.      Cholecalciferol (VITAMIN D) 50 MCG (2000 UT) Oral Cap Take 1 capsule (2,000 Units total) by mouth daily.      fluticasone propionate 50 MCG/ACT Nasal  Suspension 2 sprays by Nasal route daily. 16 mL 0    lidocaine 4 % External Patch Place 1 patch onto the skin daily. (Patient taking differently: Place 1 patch onto the skin as needed.) 10 patch 0    Meloxicam 15 MG Oral Tab Take 1 tablet (15 mg total) by mouth daily. 90 tablet 3    Fexofenadine HCl 180 MG Oral Tab Take 1 tablet (180 mg total) by mouth daily.      Meclizine HCl 25 MG Oral Tab Take 1 tablet (25 mg total) by mouth 3 (three) times daily as needed.      Tirzepatide 2.5 MG/0.5ML Subcutaneous Solution Pen-injector Inject 2.5 mg into the skin once a week. ZEPBOUND 2 mL 0    Ferrous Sulfate (IRON) 325 (65 Fe) MG Oral Tab Take 1 tablet by mouth daily.       No current facility-administered medications on file prior to visit.       ASSESSMENT  Analyzed weight data:       Diagnoses and all orders for this visit:    Therapeutic drug monitoring    Obesity, Class III, BMI 40-49.9 (morbid obesity) (HCC)    Prediabetes    Hypertriglyceridemia    Elevated blood pressure reading    Other orders  -     Tirzepatide 2.5 MG/0.5ML Subcutaneous Solution Pen-injector; Inject 2.5 mg into the skin once a week. ZEPBOUND          PLAN  Initial consult: 1/22 at 281 lb   Reconsult: 11/16 : 300 lb   Down 7 lb   Total time spent on chart review, pre-charting, obtaining history, counseling, and educating, reviewing labs was 30 minutes.  Scheduled with pain MD, reviewed treatment may help improve activity --> thus weight loss.     Reviewed coverage of Anti OM   Trial of zepbound  -advised of side effects and adverse effects of this medication  If not covered reviewed ozempic / wegovy   Working closely with Wero--> reschedule   Reviewed lab work --> recheck A1c Feb 16   Nutrition: low carb diet/ recommended to eat breakfast daily/ regular protein intake  Medication use and side effects reviewed with patient.  Medication contraindications: not interested in med management at this time  Follow up with dietitian and psychologist as  recommended.  Discussed the role of sleep and stress in weight management.  Counseled on comprehensive weight loss plan including attention to nutrition, exercise and behavior/stress management for success. See patient instruction below for more details.  Discussed strategies to overcome barriers to successful weight loss and weight maintenance  FITTE: ACSM recommendations (150-300 minutes/ week in active weight loss)   Weight Loss consent to treat reviewed and signed     There are no Patient Instructions on file for this visit.    No follow-ups on file.    Patient verbalizes understanding.    Amna Garcia MD

## 2024-01-09 VITALS — BODY MASS INDEX: 50 KG/M2 | WEIGHT: 293 LBS

## 2024-01-09 NOTE — PROGRESS NOTES
INITIAL OUTPATIENT NUTRITION CONSULTATION    Nutrition Assessment    Medical Diagnosis: Obesity, Prediabetes, and hypertriglyceridemia    Physical Findings: back pain due to spinal stenosis    Client Age and Gender: 53 year old female    Pertinent social hx: with 2 adult children.  Son and his pregnant wife are moving into pts home      Labs:   No components found for: \"HGBA1C\"  TRIGLYCERIDES   Date Value Ref Range Status   12/04/2013 151 (H) <150 mg/dL Final     Triglycerides   Date Value Ref Range Status   04/05/2023 147 30 - 149 mg/dL Final     Comment:     Reference interval for fasting triglycerides  Desirable: <150 mg/dL  Borderline: 150-199 mg/dL  High: 200-499 mg/dL  Very High: >=500 mg/dL           LDL Cholesterol   Date Value Ref Range Status   04/05/2023 63 <100 mg/dL Final     Comment:     Desirable <100 mg/dL   Borderline 100-129 mg/dL   High     >=130mg/dL         LDL CHOLESTROL   Date Value Ref Range Status   12/04/2013 155 (H) <130 mg/dL Final     HDL Cholesterol   Date Value Ref Range Status   04/05/2023 38 (L) 40 - 59 mg/dL Final     Comment:     Interpretive Information:   An HDL cholesterol <40 mg/dL is low and constitutes a coronary heart disease risk factor. An HDL cholesterol >60 mg/dL is a negative risk factor for coronary heart disease.         HDL CHOL   Date Value Ref Range Status   12/04/2013 41 (L) mg/dL Final     Comment:     <26    mg/dL  Highest CHD risk  25-35  mg/dL  High CHD risk  35-45  mg/dL  Moderate CHD risk  45-60  mg/dL  Average CHD risk  60-75  mg/dL  Below Average CHD risk     AST   Date Value Ref Range Status   08/24/2023 40 (H) 15 - 37 U/L Final   09/01/2020 24 0 - 32 U/L Final   12/04/2013 38 15 - 41 U/L Final     ALT   Date Value Ref Range Status   08/24/2023 59 (H) 13 - 56 U/L Final   09/01/2020 23 0 - 33 U/L Final   12/04/2013 52 14 - 54 U/L Final         Height:  Ht Readings from Last 1 Encounters:   01/04/24 5' 4\" (1.626 m)       Weight:   Wt Readings  from Last 2 Encounters:   01/04/24 293 lb (132.9 kg)   01/04/24 294 lb (133.4 kg)       BMI Readings from Last 1 Encounters:   01/04/24 50.29 kg/m²       Weight change: Increase of 15 lbs in the past 16 month since previous appt    Diet/Weight History: Max weight of 300 lbs in Novemeber 2023    Current Diet: Frequent fast food and diet soda drinking. Drinks ~132 oz diet soda daily.  Limited food prep at home      Meal pattern: 3 meals/d,  snacks/d not specified    Number of meals/week eaten at restaurants: 7        Estimated current caloric intake: 2630 cals/d    Estimated caloric needs for weight loss: 1880 cals/d for 1.5 pounds/week weight loss    Physical Activity: 0 hrs/week of structured activity.  Active at work at [employer].  In pain when working on her feet    Food Journal: no    Spent 60 minutes in consultation with the patient.      Nutrition Intervention/Education:  Comprehensive nutrition education and evaluation provided for weight loss. PT fatigued due to poor sleep with pain.  When fatigued craves soda and fast food.  Due to pain level cooking and purchasing foods is challenging.  Discussed impact of fast food on weight, back pain related to weight, and overall health.  Discussed simple home meals and recipe resources.  Pt encouraged to increase water intake and reduce diet soda.  Patient agreed to goals below.    Goals:   Increase water to 80 oz/d  Trial of Skinny taste recipes    Monitoring/Evaluation:   Pt encouraged to schedule appt with Dr. Garcia and dietitian on same day.  Currently not scheduled.           Wero Kim MS, RD, LDN

## 2024-01-10 ENCOUNTER — TELEPHONE (OUTPATIENT)
Dept: INTERNAL MEDICINE CLINIC | Facility: CLINIC | Age: 54
End: 2024-01-10

## 2024-01-10 ENCOUNTER — OFFICE VISIT (OUTPATIENT)
Dept: RHEUMATOLOGY | Facility: CLINIC | Age: 54
End: 2024-01-10
Payer: COMMERCIAL

## 2024-01-10 VITALS
HEART RATE: 78 BPM | DIASTOLIC BLOOD PRESSURE: 78 MMHG | BODY MASS INDEX: 50.02 KG/M2 | RESPIRATION RATE: 16 BRPM | OXYGEN SATURATION: 97 % | HEIGHT: 64 IN | WEIGHT: 293 LBS | SYSTOLIC BLOOD PRESSURE: 138 MMHG | TEMPERATURE: 98 F

## 2024-01-10 DIAGNOSIS — M19.90 OSTEOARTHRITIS, UNSPECIFIED OSTEOARTHRITIS TYPE, UNSPECIFIED SITE: ICD-10-CM

## 2024-01-10 DIAGNOSIS — M25.562 CHRONIC PAIN OF BOTH KNEES: ICD-10-CM

## 2024-01-10 DIAGNOSIS — M15.4 EROSIVE OSTEOARTHRITIS OF BOTH HANDS: ICD-10-CM

## 2024-01-10 DIAGNOSIS — M48.02 CERVICAL STENOSIS OF SPINE: ICD-10-CM

## 2024-01-10 DIAGNOSIS — G89.29 CHRONIC PAIN OF BOTH KNEES: ICD-10-CM

## 2024-01-10 DIAGNOSIS — M79.18 MYOFASCIAL PAIN DYSFUNCTION SYNDROME: ICD-10-CM

## 2024-01-10 DIAGNOSIS — M25.561 CHRONIC PAIN OF BOTH KNEES: ICD-10-CM

## 2024-01-10 DIAGNOSIS — M48.061 SPINAL STENOSIS OF LUMBAR REGION, UNSPECIFIED WHETHER NEUROGENIC CLAUDICATION PRESENT: ICD-10-CM

## 2024-01-10 DIAGNOSIS — R76.8 ANA POSITIVE: Primary | ICD-10-CM

## 2024-01-10 PROCEDURE — 3078F DIAST BP <80 MM HG: CPT | Performed by: INTERNAL MEDICINE

## 2024-01-10 PROCEDURE — 99205 OFFICE O/P NEW HI 60 MIN: CPT | Performed by: INTERNAL MEDICINE

## 2024-01-10 PROCEDURE — 3008F BODY MASS INDEX DOCD: CPT | Performed by: INTERNAL MEDICINE

## 2024-01-10 PROCEDURE — 3075F SYST BP GE 130 - 139MM HG: CPT | Performed by: INTERNAL MEDICINE

## 2024-01-10 NOTE — TELEPHONE ENCOUNTER
PA needed for Zepbound 2.5 mg will try to enter in epic  Note phentermine was in-effective in past  Need to note shortages of saxenda and wegovy

## 2024-01-10 NOTE — PROGRESS NOTES
[facility], [address] [place] [address]      Consult     Danielle Mendosa Patient Status:  No patient class for patient encounter    1970 MRN [id number]   Location [facility], [address] [place] [address] Attending No att. providers found   Hosp Day # 0 PCP Kassidy Hughes MD     Referring Provider:     Reason for Consultation: CANDELARIA+    Subjective:    Danielle Mendosa is a 53 year old female with joint pain and +CANDELARIA    Has chronic back pain and radiation lumbar spondylitis (tried cortisone injections)    Neck injections worked out (pain is gone) and back injection was done (had lumbar injection) 2023 with improvement only a few days and sent to a neurosurgeon (who told him)     Then referred to Dr. Hsu (chronic pain management) in [place]    Is considering putting on hydromorphone and muscle relaxant (but did testing to specify the pain regimen)    Phone visit on  (and then) person 2024    So plan is weight loss and surgery; trying to get weight loss drugs approved at this time     Has chronic knee pain and stiffness (can move but stairs is okay) slowly     Occasional pain in hands X rays of the hands revealed (changes of erosive OA a few years ago) did see hand specialist Dr Edmonds who did hand x rays left hand showing OA and injection of pip joints and and several other digits with some improvement for a few months.     Has has hand pain stiffness some swelling; tried hand PT with minimal improvement.     Left hand worse that other. 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     On and off  knee pain; low back; hip pain (started gabapentin 100mg bid for at least 1 year)     No titration up with gabapentin (on methocarbamol) 4 times  day ; no norco tramadol    Denies any history of DVT PE TIA CVA seizures hx of  migraines + headaches (now rare; rarely uses butalbatol )maybe once a year     States no shortness of breath ,chest pain ,fevers  ,chills    States no urinary or bowel symptoms; (constipation more than diarrhea) no blood in stool; has GERD (foot triggers it)    Started in 2020 (did a lot of tomatoes and triggered GERD)     States no headaches jaw pain, vision changes    States no history of pericardial, pleural effusions    States no significant dry eyes or dry mouth (more dry no cavities; night sweats, or LAD)     States no history of uveitis iritis scleritis    States no history of Raynaud's or digital ulcerations    States no major weight changes; chronic weight gain (since 2020) 20 lbs     Her weight has been stable    States no history of photosensitive or malar rash.(Rosacea)     States no history of psoriasis     Denies any depression anxiety or insomnia (has maybe depression/anxiety) few years more withdrawn; cried easily; anxiety;     Kids have anxiety/depression; nonrestorastive sleep (VICENTE on cpap) compliant (since one year) overall improvement     History/Other:        Past Medical History:  Past Medical History:   Diagnosis Date    Allergic rhinitis 05/11/2015    Anesthesia complication     needing more than usual w/ C Section to go under    Arthritis 2005?    Fingers, toes, lower back, knees    Back pain 1988    Family hisyory of back issues    Back problem     spinal stenosis    Constipation     Too far back to remember    Diarrhea, unspecified A couple of years    Dizziness 07/2021    Chronic vertigo    Family history of coronary artery disease     Flatulence/gas pain/belching All my life    Food intolerance Years    Too much milk upsets my stomach. Cheese does not affect it    GERD (gastroesophageal reflux disease) 12/04/2013    Headache disorder 1996?    Past 2 months headaches almost daily per dr has to do with    Hearing impairment     hearing aid right ear    Hearing loss Around 2005 2006    Hearing aid in right ear    Hearing loss in right ear     Heartburn 1995    Sporadic    Heavy menses Unknown    Had hysterectomy in      High cholesterol 2019    Been on atorvastatin and it is controlled    History of thyroglossal duct cyst     excised    IBS (irritable bowel syndrome)     Migraine     Migraines     past    VICENTE (obstructive sleep apnea) 2022-[facility]    AHI 41    Osteoarthritis     eval by dr Akers, [facility] [place], [place],     Pain in joints 2005?    Fingers and knees    Problems with swallowing Unknown 2019    Whenever i have fried foods especially    Skin blushing/flushing Only occasional until I started perimenopause    Random times    Spitting up blood the last year or so    when coughing up sputum in mornings I see blood sometimes    Sputum production unknown - years    first thing in morning usually    Stool incontinence     Marta of the time no issues.  Sometimes I barely make it to    Wears glasses     Wheezing s    With exercize        Past Surgical History:   Past Surgical History:   Procedure Laterality Date          x3    COLONOSCOPY N/A 3/23/2021    Procedure: COLONOSCOPY;  Surgeon: Chao Gonzales MD;  Location:  ENDOSCOPY    HYSTERECTOMY      benign    DEMETRIS BIOPSY STEREO NODULE 1 SITE RIGHT (CPT=19081)  2015    fibroadenoma    OTHER      left achilles tendon repair early     OTHER SURGICAL HISTORY      thyroglossal duct cyst    TOTAL ABDOM HYSTERECTOMY  2008       Social History:  reports that she has never smoked. She has never used smokeless tobacco. She reports that she does not currently use alcohol. She reports that she does not use drugs.    Family History:   Family History   Problem Relation Age of Onset    Other (CAD) Father     Other (COPD) Father     Other (BLADDER CANCER) Father     Heart Attack Father          to     Hypertension Mother     Other (HTN) Mother     Other (OSTEOPEROSIS) Mother     Other (ALS) Mother     Uterine Cancer Mother         Age 22    Mental Disorder Son         Anxiety    Mental Disorder Son         Anxiety and depression    Mental Disorder  Son         Just seen for anxiety and depression    Breast Cancer Maternal Grandmother        Allergies:   Allergies   Allergen Reactions    Sulfa Antibiotics RASH       Current Medications:  Current Outpatient Medications   Medication Sig Dispense Refill    OMEPRAZOLE 40 MG Oral Capsule Delayed Release TAKE 1 CAPSULE (40 MG TOTAL) BY MOUTH DAILY. 90 capsule 2    methocarbamol 500 MG Oral Tab Take 1 tablet (500 mg total) by mouth 4 (four) times daily. 90 tablet 0    LOSARTAN 25 MG Oral Tab TAKE 1 TABLET (25 MG TOTAL) BY MOUTH DAILY. 90 tablet 0    gabapentin 100 MG Oral Cap Take 1 capsule (100 mg total) by mouth 2 (two) times daily. 180 capsule 3    ATORVASTATIN 10 MG Oral Tab TAKE 1 TABLET BY MOUTH EVERY DAY AT NIGHT 90 tablet 3    spironolactone 25 MG Oral Tab Take 1 tablet (25 mg total) by mouth daily. 90 tablet 2    B Complex-Folic Acid (SUPER B COMPLEX MAXI) Oral Tab Take 1 tablet by mouth daily.      Cholecalciferol (VITAMIN D) 50 MCG (2000 UT) Oral Cap Take 1 capsule (2,000 Units total) by mouth daily.      fluticasone propionate 50 MCG/ACT Nasal Suspension 2 sprays by Nasal route daily. 16 mL 0    lidocaine 4 % External Patch Place 1 patch onto the skin daily. (Patient taking differently: Place 1 patch onto the skin as needed.) 10 patch 0    Meloxicam 15 MG Oral Tab Take 1 tablet (15 mg total) by mouth daily. 90 tablet 3    Fexofenadine HCl 180 MG Oral Tab Take 1 tablet (180 mg total) by mouth daily.      Meclizine HCl 25 MG Oral Tab Take 1 tablet (25 mg total) by mouth 3 (three) times daily as needed.      Tirzepatide 2.5 MG/0.5ML Subcutaneous Solution Pen-injector Inject 2.5 mg into the skin once a week. ZEPBOUND (Patient not taking: Reported on 1/10/2024) 2 mL 0          (Not in a hospital admission)      Review of Systems:     Constitutional: Negative for chills, ,+fatigue, fever and unexpected weight change.    HENT: Negative for congestion, and mouth sores.    Eyes: Negative for photophobia, pain,  redness and visual disturbance.    Respiratory: Negative for apnea, cough, chest tightness, shortness of breath, wheezing and stridor.    Cardiovascular: Negative for chest pain, palpitations and leg swelling.    Gastrointestinal: Negative for abdominal distention, abdominal pain, blood in stool, +constipation, no diarrhea and nausea.    Endocrine: Negative.     Genitourinary: Negative for decreased urine volume, difficulty urinating, dyspareunia, dysuria, flank pain, and frequency.    Musculoskeletal: + arthralgias, gait problem and +joint swelling.    Skin: Negative for color change, pallor and rash. No raynauds or digital ulcerations no sclerodactly.    Allergic/Immunologic: Negative.    Neurological: Negative for dizziness, tremors, seizures, syncope, speech difficulty, weakness, light-headedness, numbness and headaches.    Hematological: Does not bruise/bleed easily.    Psychiatric/Behavioral: Negative for confusion, decreased concentration, hallucinations, self-injury, +sleep disturbance and NO suicidal ideas or ?depression.    Objective:   [unfilled]  Vitals:    01/10/24 1202   BP: 138/78   Pulse: 78   Resp: 16   Temp: 97.6 °F (36.4 °C)          Constitutional: is oriented to person, place, and time. Appears well-developed and well-nourished. No distress.    HEENT: Normocephalic; EOMI; no jvd; no LAD; no oral or nasal ulcers.     Eyes: Conjunctivae and EOM are normal. Pupils are equal, round, and reactive to light.     Neck: Normal range of motion. No thyromegaly present.    Cardiovascular: RRR, no murmurs.    Lungs: Clear, Bilateral air entry, no wheezes.    Abdominal: Soft.    Musculoskeletal:         Joint Exam 01/10/2024        Right  Left   Sternoclavicular   Tender   Tender   Acromioclavicular   Tender   Tender   PIP 2     Swollen Tender   PIP 3   Tender  Swollen Tender   PIP 4  Swollen Tender      PIP 5   Tender      DIP 3      Tender   DIP 4   Tender      Cervical Spine   Tender      Thoracic  Spine   Tender      Lumbar Spine   Tender      Sacroiliac   Tender   Tender   Hip   Tender   Tender   Knee   Tender   Tender   Tarsometatarsal   Tender   Tender   MTP 1   Tender   Tender   MTP 2   Tender   Tender        Swollen: 3      Tender: 26          Right shoulder: Exhibits normal range of motion on abduction and internal rotation, no tenderness, no bony tenderness, no deformity, no laceration, no pain and no spasm.        Left shoulder: Exhibits normal range of motion on abduction and internal and external rotation.  no tenderness, no bony tenderness, no swelling, no effusion, no deformity, no pain, no spasm and normal strength.        Right elbow:  Exhibits normal range of motion, no swelling, no effusion and no deformity. No tenderness found. No medial epicondyle, no lateral epicondyle and no olecranon process tenderness noted. There are no contractures or tophi or nodules.        Left elbow:  Normal range of motion, no swelling, no effusion and no deformity. No medial epicondyle, no lateral epicondyle and no olecranon process tenderness noted. There are no contractures or tophi or nodules.        Right wrist:  Exhibits normal range of motion, no tenderness, no bony tenderness, no swelling, no effusion and no crepitus. Flexion and extension intact w/o limitation.        Left wrist: Exhibits normal range of motion, no tenderness, no bony tenderness, no swelling, no effusion, no crepitus and no deformity. Flexion and extension intact without limitation.        Right hip: Exhibits normal range of motion, normal strength, no tenderness, no bony tenderness, no swelling and no crepitus.        Right hand: No synovitis of MCP, or DIP joints; there are extensive scattered Bouchards or Heberden nodules noted;  strength: 95%.  Severe squaring first CMC joint; ulnar deviation first through fourth MCP joint flexion deformities at multiple DIP joints        Left hand: No synovitis of MCP, or DIP joints; there are  extensive scattered Bouchards or Heberden nodules noted;  strength: 95%.  Severe squaring first CMC joint: Ulnar deviation first through fourth MCP joints; flexion deformities at the DIP joints        Left hip: Exhibits normal range of motion, normal strength, no tenderness, no bony tenderness, No swelling and no crepitus.        Right knee: Exhibits normal range of motion, no swelling, no effusion, no ecchymosis, no deformity and no erythema. No tenderness found. No medial joint line, no lateral joint line, no MCL and no LCL tenderness noted. mod crepitation on flexion of knee and extension normal.  Noted quad atrophy        Left knee:  Exhibits normal range of motion, no swelling, no effusion, no ecchymosis and no erythema. No tenderness found. No medial joint line, no lateral joint line and no patellar tendon tenderness noted. mod crepitation on flexion of the knee. Extension intact and normal.  Noted quad atrophy        Right ankle: No swelling, no deformity. No tenderness. Dorsiflexion and plantar flexion intact without limitation in range of motion.        Left ankle: Exhibits no swelling. No tenderness. No lateral malleolus and no medial malleolus tenderness found. Achilles tendon normal. Achilles tendon exhibits no pain, no defect and normal Santos's test results.  Dorsiflexion and plantar flexion intact without limitation in range of motion.        Cervical back: Exhibits normal range of motion, no tenderness, no bony tenderness, no swelling, + pain and+ spasm.        Thoracic back: Exhibits normal range of motion, no tenderness, no bony tenderness and + spasm.        Lumbar back:  Exhibits normal range of motion, no tenderness, no bony tenderness, no pain and + spasm.  Patient is able to touch fingers to floor        Right foot: normal. There is normal range of motion, no tenderness, no bony tenderness, no crepitus and no laceration. There is no synovitis or tenderness of the MTP joints to palpation.   Significant bony enlargement MTP joint        Left foot: normal. There is normal range of motion, no tenderness, no bony tenderness and no crepitus. There is no synovitis or tenderness of the MTP joints to palpation.  Significant bony enlargement MTP joints    Lymphadenopathy: No submental, no submandibular, and no occipital adenopathy present, has no cervical adenopathy or axillary lympadenopathy.    Neurological: Alert and oriented. No focal motor or sensory abnormalities. Strength is 5/5 Upper Extremities/Lower Extremities proximally and distally.    Skin: Skin is warm, dry and intact.    Psychiatric: Normal behavior.    Results:    Labs:      Lab Results   Component Value Date    WBC 5.6 10/31/2023    RBC 4.42 10/31/2023    HGB 13.2 10/31/2023    HCT 40.0 10/31/2023    MCV 90.5 10/31/2023    MCH 29.9 10/31/2023    MCHC 33.0 10/31/2023    RDW 12.7 10/31/2023    .0 10/31/2023       No components found for: \"RELY\", \"NMET\", \"MYEL\", \"PROMY\", \"MARIA LUZ\", \"ABSNEUTS\", \"ABSBANDS\", \"ABMM\", \"ABMY\", \"ABPM\", \"ABBL\"      Lab Results   Component Value Date     06/19/2023    K 3.9 06/19/2023    CO2 27.0 06/19/2023    BUN 13 06/19/2023    GFR 91 08/03/2017    ALB 3.8 08/24/2023    AST 40 (H) 08/24/2023    ALT 59 (H) 08/24/2023          No components found for: \"ESRWESTERGRN\"       Lab Results   Component Value Date    CRP <0.29 08/06/2019         No results found for: \"COLOR\", \"CLARITY\", \"UROBILINOGEN\", \"YEAST\"  @LABRCNTIP(RF,B12)@      [unfilled]    Imaging:  No results found.    atting of this note might be different from the original.   PROCEDURE: XR LUMBAR SPINE SERIES 4 OR MORE VWS     HISTORY: Left lower back pain     FINDINGS: 5 images are submitted. There is slight curvature of the spine convex to the left centered near the thoracic lumbar junction. No fracture or destructive lesion is seen. There is mild L5-S1 disc space narrowing and minimal L4-5 narrowing. Some anterior spurring is seen in the upper to mid  lumbar region with facet degenerative changes in the mid to lower lumbar spine. Minimal left greater than right SI joint spurring is seen.     IMPRESSION:   1. Mild degenerative changes as above most prominent at L5-S1 where there is some disc space narrowing and facet change.   2. Slight curvature of the spine convex to the left centered in the thoracic lumbar junction.   3. Low-grade SI joint spurring.     EXAM: XR CERVICAL SPINE SERIES 4 OR 5 VWS    CLINICAL HISTORY: Cervical radiculopathy.  No injury or surgery to neck.  A clip with pain on right side of neck that travels to fingers on right hand.  Pain for 10 weeks.    COMPARISON: None.    FINDINGS: 5 views of the cervical spine and lateral flexion and extension views are provided.    No definite acute fracture or traumatic malalignment.    Straightening of the cervical spine may be positional secondary to muscle spasm.    No significant prevertebral soft tissue swelling.    Moderate intervertebral disc space narrowing at C5-6 and mild disc space narrowing at C4-5.  Small marginal osteophytes noted.  The rest of the intervertebral disc spaces are maintained. Mild multilevel osteoarthritis of the facet joints and uncovertebral joints seen.  Evaluation of the neuroforamina is limited due to suboptimal positioning.    On the lateral flexion and extension views, the cervical spine is visualized to C6 level.  Of the visualized cervical spine, no definite abnormal motion seen.      IMPRESSION:  1.  No evidence of acute cervical spine fracture or traumatic malalignment.  2.  Straightening of the cervical spine may be positional or secondary to muscle spasm.  3.  Mild degenerative disease of the cervical spine.    FINAL REPORT  Attending Radiologist:  Jo Guardado MD  Date Signed Off:  02/28/2023 14:01  Procedure Note    Jo Guardado MD - 02/28/2023  Formatting of this note might be different from the original.  EXAM: XR CERVICAL SPINE SERIES 4 OR 5 VWS    CLINICAL  HISTORY: Cervical radiculopathy.  No injury or surgery to neck.  A clip with pain on right side of neck that travels to fingers on right hand.  Pain for 10 weeks.    COMPARISON: None.    FINDINGS: 5 views of the cervical spine and lateral flexion and extension views are provided.    No definite acute fracture or traumatic malalignment.    Straightening of the cervical spine may be positional secondary to muscle spasm.    No significant prevertebral soft tissue swelling.    Moderate intervertebral disc space narrowing at C5-6 and mild disc space narrowing at C4-5.  Small marginal osteophytes noted.  The rest of the intervertebral disc spaces are maintained. Mild multilevel osteoarthritis of the facet joints and uncovertebral joints seen.  Evaluation of the neuroforamina is limited due to suboptimal positioning.    On the lateral flexion and extension views, the cervical spine is visualized to C6 level.  Of the visualized cervical spine, no definite abnormal motion seen.      IMPRESSION:  1.  No evidence of acute cervical spine fracture or traumatic malalignment.  2.  Straightening of the cervical spine may be positional or secondary to muscle spasm.  3.  Mild degenerative disease of the cervical spine.    Venus Javed MD - 09/29/2021   Formatting of this note might be different from the original.   PROCEDURE:  XR FOOT, COMPLETE (MIN 3 VIEWS), LEFT (CPT=73400)     TECHNIQUE:  AP, oblique, and lateral views were obtained.     COMPARISON:  None.     INDICATIONS:  M79.672 Pain in left foot     PATIENT STATED HISTORY: (As transcribed by Technologist)  Distal metatarsal pain.     =====   CONCLUSION: Joint space narrowing 1st through 5th IP joints with mild hypertrophic changes.     No evidence for acute fracture.  No dislocation.     There is calcaneal plantar spurring.     PROCEDURE:  XR HAND (MIN 3 VIEWS), LEFT (CPT=73130)    TECHNIQUE:  Three views were obtained.    COMPARISON:  None.    INDICATIONS:  S69.92XA  Unspecified injury of left wrist, hand and finger(s), initial encounter    PATIENT STATED HISTORY: (As transcribed by Technologist)  Patient states she rolled her hand under this mornong. Pain with gripping objects.        FINDINGS:  No fracture or dislocation.  Marked loss of distal interphalangeal joint spaces is noted.   marked loss of 5th digit proximal interphalangeal joint space.    Questionable marginal erosions in the distal interphalangeal joints are noted.    IMPRESSION:  Findings are suggestive of inflammatory arthropathy.      Dictated by: Nima Puga MD on 8/08/2018 at 14:51      Approved by: Nima Puga MD          Procedure Note    Nima Puga MD - 08/08/2018  Formatting of this note might be different from the original.  =====  PROCEDURE:  XR HAND (MIN 3 VIEWS), LEFT (CPT=73130)    TECHNIQUE:  Three views were obtained.    COMPARISON:  None.    INDICATIONS:  S69.92XA Unspecified injury of left wrist, hand and finger(s), initial encounter    PATIENT STATED HISTORY: (As transcribed by Technologist)  Patient states she rolled her hand under this mornong. Pain with gripping objects.      FINDINGS:  No fracture or dislocation.  Marked loss of distal interphalangeal joint spaces is noted.   marked loss of 5th digit proximal interphalangeal joint space.    Questionable marginal erosions in the distal interphalangeal joints are noted.    IMPRESSION:  Findings are suggestive of inflammatory arthropathy.      Dictated by: Nima Puga MD on 8/08/2018 at 14:51    MPRESSION:                                                              1.  Small effusion.                                                                                                                              2.  Moderate medial degenerative disease.                                                                                                        3.  Evidence of old Osgood-Schlatter disease.                                                                                                                                                                                                                Frank Hall M.D.            NISA/alexx                           RADIOLOGY                              D:  05-  15:42                                                    T:  05-  15:45                                                                                                                            RAD CHB:                                                                Narrative      Assessment & Plan:      53-year-old woman comes in for further evaluation for positive CANDELARIA 1-1 60    History of chronic back and neck pain with cervical disc disease lumbar stenosis followed by pain management  Moderate to severe arthritis bilateral knees  Suspect myofascial pain syndrome with nonrestorative sleep fatigue neuropathy possible anxiety mild depression and tender points  Suspect erosive osteoarthritis would would need to rule out erosive seronegative RA  Morbid obesity  Positive CANDELARIA without clear history suggestive of inflammatory arthritis other than her hand deformities    Significant deformities of the hands more concerning for erosive arthritis but erosive seronegative RA is a consideration  Will recent RF normal will add on CCP inflammatory markers ESTEFANY panel  Previous hepatitis serologies negative  Update x-rays of the hands.  Will likely need to order MRI of the right hand especially if testing normal to rule out seronegative RA  I agree with pain management follow-up  Gabapentin 100 mg twice daily she should be on much higher doses through pain management she is working with them for pain management and has an appointment shortly to consider agent such as hydromorphone and muscle relaxant.  I have advised her to try to avoid narcotics and consider up titration of gabapentin through them instead  She has  minimal response to injections consider neurosurgical evaluation which has stated that she needs to lose weight to consider surgery she is working on weight loss medication to get approved shortly through her PCP    She has done physical therapy for her back we may need to add on physical therapy for her knees with quad quad atrophy and severity of osteoarthritis    She is already on anti-inflammatories and pain management through her pain doctor and back doctor    I am looking into ruling out erosive inflammatory arthritis.  If this is erosive osteoarthritis could consider hydroxychloroquine trial despite minimal data showing improvement and prevention based on studies.  But sometimes helps symptomatically    We will do a telephone visit to discuss this if needed once we have workup and x-rays will have more definitive plan of treatment.  She will likely need to be referred to orthopedic surgeon based on the severity of OA from many years ago    Has mild subjective swelling periodically of the hands no other joints.  We will get an MRI of the hand to rule out seronegative inflammatory arthritis after updating x-rays first and autoimmune testing    Education and counseling provided to patient.  Instructed patient to call my office or seek medical attention immediately if symptoms worsen. Risks and side effects of medications and diagnosis discussed in detail and patient was given written information on new prescribed medications.    Return to clinic:  Return in about 1 year (around 1/10/2025).    Micki Buchanan MD  1/10/2024

## 2024-01-10 NOTE — PATIENT INSTRUCTIONS
OSTEOARTHRITIS    Fast Facts    Though some of the joint changes are irreversible, most patients will not need joint replacement surgery.    OA symptoms (what you feel) can vary greatly among patients.    A rheumatologist can detect arthritis and prescribe the proper treatment. The goal of treatment in OA is to reduce pain and improve function.    Exercise is an important part of OA treatment, because it can decrease joint pain and improve function.    At present, there is no treatment that can reverse the damage of OA in the joints. Researchers are trying to find ways to slow or reverse this joint damage.    Osteoarthritis (also known as OA) is a common joint disease that most often affects middle-age to elderly people. It is commonly referred to as \"wear and tear\" of the joints, but we now know that OA is a disease of the entire joint, involving the cartilage, joint lining, ligaments, and bone. Although it is more common in older people, it is not really accurate to say that the joints are just \"wearing out.\" It is characterized by breakdown of the cartilage (the tissue that cushions the ends of the bones between joints), bony changes of the joints, deterioration of tendons and ligaments, and various degrees of inflammation of the joint lining (called the synovium).    This arthritis tends to occur in the hand joints, spine, hips, knees, and great toes. The lifetime risk of developing OA of the knee is about 46%, and the lifetime risk of developing OA of the hip is 25%, according to the [facility] Osteoarthritis Project, a long-term study from the [facility] and sponsored by the Centers for Disease Control and Prevention (often called the CDC) and the National Institutes of Health.    OA is a top cause of disability in older people. The goal of osteoarthritis treatment is to reduce pain and improve function. There is no cure for the disease, but some treatments attempt to slow disease  progression.         What is osteoarthritis?    OA is a frequently slowly progressive joint disease typically seen in middle-aged to elderly people. In osteoarthritis, the cartilage between the bones in the joint breaks down. This causes the affected bones to slowly get bigger. The joint cartilage often breaks down because of mechanical stress or biochemical changes within the body, causing the bone underneath to fail. OA can occur together with other types of arthritis, such as gout or rheumatoid arthritis.    OA tends to affect commonly used joints such as the hands and spine, and the weight-bearing joints such as the hips and knees. Symptoms include:    Joint pain and stiffness    Knobby swelling at the joint    Cracking or grinding noise with joint movement    Decreased function of the joint    How do you treat osteoarthritis?    There is no proven treatment yet that can reverse joint damage from OA. The goal of osteoarthritis treatment is to reduce pain and improve function of the affected joints. Most often, this is possible with a mixture of physical measures and drug therapy and, sometimes, surgery.    Physical measures: Weight loss and exercise are useful in OA. Excess weight puts stress on your knee joints and hips and low back. For every 10 pounds of weight you lose over 10 years, you can reduce the chance of developing knee OA by up to 50 percent. Exercise can improve your muscle strength, decrease joint pain and stiffness, and lower the chance of disability due to OA. Also helpful are support (\"assistive\") devices, such as orthotics or a walking cane, that help you do daily activities. Heat or cold therapy can help relieve OA symptoms for a short time.    Certain alternative treatments such as spa (hot tub), massage, and chiropractic manipulation can help relieve pain for a short time. They can be costly, though, and require repeated treatments. Also, the long-term benefits of these alternative  (sometimes called complementary or integrative) medicine treatments are unproven but are under study.    Drug therapy: Forms of drug therapy include topical, oral (by mouth) and injections (shots). You apply topical drugs directly on the skin over the affected joints. These medicines include capsaicin cream, lidocaine and diclofenac gel. Oral pain relievers such as acetaminophen are common first treatments. So are nonsteroidal anti-inflammatory drugs (often called NSAIDs), which decrease swelling and pain.    In 2010, the government (FDA) approved the use of duloxetine (Cymbalta) for chronic (long-term) musculoskeletal pain including from OA. This oral drug is not new. It also is in use for other health concerns, such as mood disorders, nerve pain and fibromyalgia.    Patients with more serious pain may need stronger medications, such as prescription narcotics.    Joint injections with corticosteroids (sometimes called cortisone shots) or with a form of lubricant called hyaluronic acid can give months of pain relief from OA. This lubricant is given in the knee, and these shots may help delay the need for a knee replacement by a few years in some patients.    Surgery: Surgical treatment becomes an option for severe cases. This includes when the joint has serious damage, or when medical treatment fails to relieve pain and you have major loss of function. Surgery may involve arthroscopy, repair of the joint done through small incisions (cuts). If the joint damage cannot be repaired, you may need a joint replacement.    Supplements: Many over-the-counter nutrition supplements have been used for osteoarthritis treatment. Most lack good research data to support their effectiveness and safety. Among the most widely used are calcium, vitamin D and omega-3 fatty acids. To ensure safety and avoid drug interactions, consult your doctor or pharmacist before using any of these supplements. This is especially true when you are  combining these supplements with prescribed     Positive CANDELARIA    Fast Facts    Autoimmune diseases can be treated.    A positive CANDELARIA test means autoantibodies are present. By itself, a positive CANDELARIA test does not indicate the presence of an autoimmune disease or the need for therapy.    Some medications cause a positive CANDELARIA. Tell your doctor all prescription, over-the-counter, and street drugs you take.    CANDELARIA testing can produce a positive result without any actual disease process. This typically signals the presence of antinuclear antibodies in a healthy individual.    Talk to your doctor about a positive CANDELARIA and best next steps for further evaluation.    The immune system makes an abundance of proteins called antibodies. Antibodies are made by white blood cells (B cells). The antibodies recognize and combat infectious organisms (germs) in the body. Antibodies develop in our immune system to help the body fight infectious organisms. When an antibody recognizes the foreign proteins of an infectious organism, it recruits other proteins and cells to fight off the infection. This cascade of attack is called inflammation.    Sometimes these antibodies make a mistake, identifying normal, naturally-occurring proteins in our bodies as being \"foreign\" and dangerous. When these antibodies make incorrect calls, identifying a naturally-occurring protein (or self protein) as foreign, they are called autoantibodies. Autoantibodies start the cascade of inflammation, causing the body to attack itself. The antibodies that target \"normal\" proteins within the nucleus of a cell are called antinuclear antibodies (CANDELARIA). Most of us have autoantibodies, but typically in small amounts. The presence of large amount of autoantibodies or ANAs can indicate an autoimmune disease. ANAs could signal the body to begin attacking itself which can lead to autoimmune diseases, including lupus, scleroderma, Sjögren's syndrome,  polymyositis/dermatomyositis, mixed connective tissue disease, drug-induced lupus, and autoimmune hepatitis. A positive CANDELARIA can also be seen in juvenile arthritis.         A negative CANDELARIA reading means no autoantibodies are present in the body. However, a positive CANDELARIA reading alone does not indicate an autoimmune disease.    The prevalence of ANAs in healthy individuals is about 3 - 15%. The production of these autoantibodies is strongly age-dependent, and increases to 10 - 37% in healthy persons over the age of 65. Even healthy people with viral infections can have a positive CANDELARIA, albeit for a short time.    Some medications can cause a positive CANDELARIA. It is important to talk with your doctor about all the drugs you are taking - prescription, over-the-counter, and street.    Other conditions, such as cancer, can cause a positive CANDELARIA.    The positive CANDELARIA reading simply tells your doctor to keep looking. In fact, you may have a positive CANDELARIA without any disease process which means that the evidence is not there to make a diagnosis of lupus or any other autoimmune disease. To make a definite diagnosis, your doctor will need more blood tests along with history of your symptoms and a physical examination.         How should I handle a positive CANDELARIA reading?    Your rheumatologist will interpret your CANDELARIA in the context of other laboratory studies and your clinical history, including family history. Remember, a single positive CANDELARIA does not imply autoimmune disease, nor does a positive CANDELARIA require immediate treatment. Lab levels vary; some autoantibodies are normal and this result may not indicate a problem.    Your rheumatologist will determine what happens next based on additional exploration. By working with your doctor and asking questions you will get the best care for your particular situation. Keep in mind, even if your CANDELARIA reading does lead to an autoimmune diagnosis, there are treatments for autoimmune diseases.        Overview    Myofascial pain syndrome is a chronic pain disorder. In this condition, pressure on sensitive points in your muscles (trigger points) causes pain in the muscle and sometimes in seemingly unrelated parts of your body. This is called referred pain.    This syndrome typically occurs after a muscle has been contracted repetitively. This can be caused by repetitive motions used in jobs or hobbies or by stress-related muscle tension.    While nearly everyone has experienced muscle tension pain, the discomfort associated with myofascial pain syndrome persists or worsens. Treatment options include physical therapy and trigger point injections. Pain medications and relaxation techniques also can help.         Symptoms    Signs and symptoms of myofascial pain syndrome may include:    Deep, aching pain in a muscle    Pain that persists or worsens    A tender knot in a muscle    Difficulty sleeping due to pain    When to see a doctor    Make an appointment with your doctor if you experience muscle pain that doesn't go away. Nearly everyone experiences muscle pain from time to time. But if your muscle pain persists despite rest, massage and similar self-care measures, make an appointment with your doctor.    Causes    Sensitive areas of tight muscle fibers can form in your muscles after injuries or overuse. These sensitive areas are called trigger points. A trigger point in a muscle can cause strain and pain throughout the muscle. When this pain persists and worsens, doctors call it myofascial pain syndrome.    Myofascial pain syndrome is caused by a stimulus, such as muscle tightness, that sets off trigger points in your muscles. Factors that may increase your risk of muscle trigger points include:    Muscle injury. An acute muscle injury or continual muscle stress may lead to the development of trigger points. For example, a spot within or near a strained muscle may become a trigger point. Repetitive motions and  poor posture also may increase your risk.    Stress and anxiety. People who frequently experience stress and anxiety may be more likely to develop trigger points in their muscles. One theory holds that these people may be more likely to clench their muscles, a form of repeated strain that leaves muscles susceptible to trigger points.    Complications    Complications associated with myofascial pain syndrome may include:    Sleep problems. Signs and symptoms of myofascial pain syndrome may make it difficult to sleep at night. You may have trouble finding a comfortable sleep position. And if you move at night, you might hit a trigger point and awaken.    Fibromyalgia. Some research suggests that myofascial pain syndrome may develop into fibromyalgia in some people. Fibromyalgia is a chronic condition that features widespread pain. It's believed that the brains of people with fibromyalgia become more sensitive to pain signals over time. Some doctors believe myofascial pain syndrome may play a role in starting this process.

## 2024-01-11 ENCOUNTER — HOSPITAL ENCOUNTER (OUTPATIENT)
Dept: GENERAL RADIOLOGY | Age: 54
Discharge: HOME | End: 2024-01-11
Attending: INTERNAL MEDICINE
Payer: COMMERCIAL

## 2024-01-11 ENCOUNTER — LAB ENCOUNTER (OUTPATIENT)
Dept: LAB | Age: 54
End: 2024-01-11
Attending: INTERNAL MEDICINE
Payer: COMMERCIAL

## 2024-01-11 DIAGNOSIS — M48.061 SPINAL STENOSIS OF LUMBAR REGION, UNSPECIFIED WHETHER NEUROGENIC CLAUDICATION PRESENT: ICD-10-CM

## 2024-01-11 DIAGNOSIS — R76.8 ANA POSITIVE: ICD-10-CM

## 2024-01-11 DIAGNOSIS — M15.4 EROSIVE OSTEOARTHRITIS OF BOTH HANDS: ICD-10-CM

## 2024-01-11 LAB
CK SERPL-CCNC: 161 U/L
CRP SERPL-MCNC: <0.29 MG/DL (ref ?–0.3)
ERYTHROCYTE [SEDIMENTATION RATE] IN BLOOD: 25 MM/HR
THYROGLOB SERPL-MCNC: <15 U/ML (ref ?–60)
THYROPEROXIDASE AB SERPL-ACNC: <28 U/ML (ref ?–60)
TSI SER-ACNC: 1.32 MIU/ML (ref 0.36–3.74)
URATE SERPL-MCNC: 4.7 MG/DL

## 2024-01-11 PROCEDURE — 86235 NUCLEAR ANTIGEN ANTIBODY: CPT

## 2024-01-11 PROCEDURE — 73560 X-RAY EXAM OF KNEE 1 OR 2: CPT | Performed by: INTERNAL MEDICINE

## 2024-01-11 PROCEDURE — 84550 ASSAY OF BLOOD/URIC ACID: CPT

## 2024-01-11 PROCEDURE — 86200 CCP ANTIBODY: CPT | Performed by: INTERNAL MEDICINE

## 2024-01-11 PROCEDURE — 86376 MICROSOMAL ANTIBODY EACH: CPT

## 2024-01-11 PROCEDURE — 73130 X-RAY EXAM OF HAND: CPT | Performed by: INTERNAL MEDICINE

## 2024-01-11 PROCEDURE — 85652 RBC SED RATE AUTOMATED: CPT

## 2024-01-11 PROCEDURE — 84443 ASSAY THYROID STIM HORMONE: CPT

## 2024-01-11 PROCEDURE — 86225 DNA ANTIBODY NATIVE: CPT

## 2024-01-11 PROCEDURE — 86140 C-REACTIVE PROTEIN: CPT

## 2024-01-11 PROCEDURE — 72190 X-RAY EXAM OF PELVIS: CPT | Performed by: INTERNAL MEDICINE

## 2024-01-11 PROCEDURE — 82550 ASSAY OF CK (CPK): CPT

## 2024-01-11 PROCEDURE — 86800 THYROGLOBULIN ANTIBODY: CPT

## 2024-01-11 PROCEDURE — 36415 COLL VENOUS BLD VENIPUNCTURE: CPT

## 2024-01-11 PROCEDURE — 73630 X-RAY EXAM OF FOOT: CPT | Performed by: INTERNAL MEDICINE

## 2024-01-15 ENCOUNTER — TELEPHONE (OUTPATIENT)
Dept: RHEUMATOLOGY | Facility: CLINIC | Age: 54
End: 2024-01-15

## 2024-01-15 NOTE — TELEPHONE ENCOUNTER
PATIENT STATED HISTORY: (As transcribed by Technologist)  Patient has a history of osteoarthritis and has been having chronic discomfort to her bilateral knees.  She can hear/feel cracking and popping to her knees with movement.  She has difficulty going    up the stairs and needs to take one step at a time.          FINDINGS:    No joint effusion is seen.  No erosions are seen.  There is patellofemoral narrowing present which is overall moderate.  Minimal osteophyte formation seen over the medial femoral condyle and medial tibial plateau.         X-ray of the knee shows moderate arthritis please see previous note before calling

## 2024-01-15 NOTE — TELEPHONE ENCOUNTER
X-rays of the hip showed mild arthritis    X-rays of show show mild arthritis    Other testing so far is normal.  Will let her know as anything else comes back concerning

## 2024-01-15 NOTE — TELEPHONE ENCOUNTER
Called pt explained X-rays of the hip showed mild arthritis, knee shows moderate arthritis,      X-rays of show show mild arthritis     Other testing so far is normal.  Will let her know as anything else comes back concerning. Pt voices understanding, pt would like clarification regarding hand and feet imaging results.

## 2024-01-16 LAB
CCP IGG SERPL-ACNC: 1.3 U/ML (ref 0–6.9)
DSDNA IGG SERPL IA-ACNC: <0.6 IU/ML
ENA RNP IGG SER IA-ACNC: 2.3 U/ML
ENA SM IGG SER IA-ACNC: 0.8 U/ML
ENA SS-A IGG SER IA-ACNC: <0.4 U/ML
ENA SS-B IGG SER IA-ACNC: <0.4 U/ML
U1 SNRNP IGG SER IA-ACNC: 1.7 U/ML

## 2024-01-16 NOTE — TELEPHONE ENCOUNTER
Called pt and results reviewed   It shows changes of osteoarthritis    There is a bone spurs at the base of the heel.  Which is also consistent with osteoarthritis which is wear-and-tear.   Pt appreciative

## 2024-02-06 ENCOUNTER — TELEPHONE (OUTPATIENT)
Dept: RHEUMATOLOGY | Facility: CLINIC | Age: 54
End: 2024-02-06

## 2024-02-06 NOTE — TELEPHONE ENCOUNTER
Spoke to patient regarding her MRI results. Patient states that she was waiting to her from  regarding what her results are. I informed patient that we have not received the MRI results as of now. She stated that [facility] stated that they requested her hand xrays for comparison. I sent patient's xray results to [facility] for them to compare. Patient states that she will call them and notify them that we have faxed the xray results to them and will await the results of the MRI before we can tell patient what the next steps are.

## 2024-02-14 ENCOUNTER — TELEPHONE (OUTPATIENT)
Dept: RHEUMATOLOGY | Facility: CLINIC | Age: 54
End: 2024-02-14

## 2024-02-14 NOTE — TELEPHONE ENCOUNTER
MRI of the hand as suspected shows arthritis and changes of erosive osteoarthritis there is no changes of rheumatoid arthritis    As I discussed in clinic I am more than willing to try hydroxychloroquine.    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    Given her information on this before and discussed in clinic.  If she would like to proceed let me know and send that the prescription I will okay it.      Would recommend follow-up in 4 to 6 months

## 2024-02-15 ENCOUNTER — TELEPHONE (OUTPATIENT)
Dept: RHEUMATOLOGY | Facility: CLINIC | Age: 54
End: 2024-02-15

## 2024-02-15 NOTE — TELEPHONE ENCOUNTER
Spoke to patient regarding the below message:    MRI of the hand as suspected shows arthritis and changes of erosive osteoarthritis there is no changes of rheumatoid arthritis     As I discussed in clinic I am more than willing to try hydroxychloroquine.     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     Given her information on this before and discussed in clinic.  If she would like to proceed let me know and send that the prescription I will okay it.        Would recommend follow-up in 4 to 6 months    Patient verbalized understanding and stated that she is going to read up on the Hydroxychloroquine and then she will send a message through [employer] and let us know if she would like to proceed with it.

## 2024-02-20 ENCOUNTER — TELEPHONE (OUTPATIENT)
Dept: RHEUMATOLOGY | Facility: CLINIC | Age: 54
End: 2024-02-20

## 2024-03-08 ENCOUNTER — PATIENT MESSAGE (OUTPATIENT)
Dept: RHEUMATOLOGY | Facility: CLINIC | Age: 54
End: 2024-03-08

## 2024-03-08 DIAGNOSIS — G89.29 CHRONIC PAIN OF BOTH KNEES: ICD-10-CM

## 2024-03-08 DIAGNOSIS — M25.561 CHRONIC PAIN OF BOTH KNEES: ICD-10-CM

## 2024-03-08 DIAGNOSIS — M48.061 SPINAL STENOSIS OF LUMBAR REGION, UNSPECIFIED WHETHER NEUROGENIC CLAUDICATION PRESENT: ICD-10-CM

## 2024-03-08 DIAGNOSIS — M19.90 OSTEOARTHRITIS, UNSPECIFIED OSTEOARTHRITIS TYPE, UNSPECIFIED SITE: ICD-10-CM

## 2024-03-08 DIAGNOSIS — M15.4 EROSIVE OSTEOARTHRITIS OF BOTH HANDS: Primary | ICD-10-CM

## 2024-03-08 DIAGNOSIS — M25.562 CHRONIC PAIN OF BOTH KNEES: ICD-10-CM

## 2024-03-11 RX ORDER — MELOXICAM 15 MG/1
15 TABLET ORAL DAILY
Qty: 90 TABLET | Refills: 0 | Status: SHIPPED | OUTPATIENT
Start: 2024-03-11

## 2024-03-11 NOTE — TELEPHONE ENCOUNTER
LOV: 1/10/24  No future appointments.  LABS:  Component      Latest Ref Rng 4/5/2023 1/11/2024   WBC      4.0 - 11.0 x10(3) uL 5.9     RBC      3.80 - 5.30 x10(6)uL 4.65     Hemoglobin      12.0 - 16.0 g/dL 11.3 (L)     Hematocrit      35.0 - 48.0 % 36.2     Platelet Count      150.0 - 450.0 10(3)uL 314.0     MCV      80.0 - 100.0 fL 77.8 (L)     MCH      26.0 - 34.0 pg 24.3 (L)     MCHC      31.0 - 37.0 g/dL 31.2     RDW      % 17.1     Prelim Neutrophil Abs      1.50 - 7.70 x10 (3) uL 3.16     Neutrophils Absolute      1.50 - 7.70 x10(3) uL 3.16     Lymphocytes Absolute      1.00 - 4.00 x10(3) uL 2.07     Monocytes Absolute      0.10 - 1.00 x10(3) uL 0.53     Eosinophils Absolute      0.00 - 0.70 x10(3) uL 0.07     Basophils Absolute      0.00 - 0.20 x10(3) uL 0.05     Immature Granulocyte Absolute      0.00 - 1.00 x10(3) uL 0.02     Neutrophils %      % 53.6     Lymphocytes %      % 35.1     Monocytes %      % 9.0     Eosinophils %      % 1.2     Basophils %      % 0.8     Immature Granulocyte %      % 0.3     Glucose      70 - 99 mg/dL 95     Sodium      136 - 145 mmol/L 138     Potassium      3.5 - 5.1 mmol/L 4.3     Chloride      98 - 112 mmol/L 105     Carbon Dioxide, Total      21.0 - 32.0 mmol/L 27.0     ANION GAP      0 - 18 mmol/L 6     BUN      7 - 18 mg/dL 11     CREATININE      0.55 - 1.02 mg/dL 0.93     CALCIUM      8.5 - 10.1 mg/dL 8.8     CALCULATED OSMOLALITY      275 - 295 mOsm/kg 285     EGFR      >=60 mL/min/1.73m2 74     AST (SGOT)      15 - 37 U/L 49 (H)     ALT (SGPT)      13 - 56 U/L 61 (H)     ALKALINE PHOSPHATASE      41 - 108 U/L 56     Total Bilirubin      0.1 - 2.0 mg/dL 0.8     PROTEIN, TOTAL      6.4 - 8.2 g/dL 7.4     Albumin      3.4 - 5.0 g/dL 3.6     Globulin      2.8 - 4.4 g/dL 3.8     A/G Ratio      1.0 - 2.0  0.9 (L)     Patient Fasting for CMP? Yes     CK      26 - 192 U/L  161       Legend:  (L) Low  (H) High

## 2024-03-11 NOTE — TELEPHONE ENCOUNTER
From: Danielle Mendosa  To: Micki Buchanan  Sent: 3/8/2024 9:43 AM CST  Subject: Several things    Hello,  First thing I need is for Dr. Buchanan to send a prescription for Meloxicam to [facility] in [facility] in [place]. Because Dr. Mcknight is the one who prescribed it before I switched to her they can't fill it.   Second, I had the baseline eye exam yesterday with  at [facility]. They will be faxing over the results today. I have the hydroxycholorquine. I just wanted to wait til I heard back from her office before I start it. Can I still take the meloxicam or will this replace it? The pharmacy usually has me speak with pharmacist when I start a new drug but they didn't this time. Is there anything I need to know before I start it?

## 2024-03-22 RX ORDER — SPIRONOLACTONE 25 MG/1
25 TABLET ORAL DAILY
Qty: 90 TABLET | Refills: 2 | Status: SHIPPED | OUTPATIENT
Start: 2024-03-22

## 2024-03-22 RX ORDER — LOSARTAN POTASSIUM 25 MG/1
25 TABLET ORAL DAILY
Qty: 90 TABLET | Refills: 0 | Status: SHIPPED | OUTPATIENT
Start: 2024-03-22 | End: 2024-06-20

## 2024-03-22 NOTE — TELEPHONE ENCOUNTER
Spironolactone   Last refill: 08/10/23  Qty: 90  W/ 2 refills  Last ov:  07/19/23    Losartan  Last refill: 11/13/23  Qty: 90  W/ 0 refills  Last ov 07/19/23  Requested Prescriptions     Pending Prescriptions Disp Refills    SPIRONOLACTONE 25 MG Oral Tab [Pharmacy Med Name: SPIRONOLACTONE 25 MG TABLET] 90 tablet 2     Sig: TAKE 1 TABLET (25 MG TOTAL) BY MOUTH DAILY.    LOSARTAN 25 MG Oral Tab [Pharmacy Med Name: LOSARTAN POTASSIUM 25 MG TAB] 90 tablet 0     Sig: TAKE 1 TABLET (25 MG TOTAL) BY MOUTH DAILY.     No future appointments.

## 2024-03-22 NOTE — TELEPHONE ENCOUNTER
Called Danielle and set up a physical.  Future Appointments   Date Time Provider Department Center   4/16/2024 10:30 AM Kassidy Hughes MD [facility]

## 2024-04-16 ENCOUNTER — LABORATORY ENCOUNTER (OUTPATIENT)
Dept: LAB | Age: 54
End: 2024-04-16
Attending: INTERNAL MEDICINE
Payer: COMMERCIAL

## 2024-04-16 ENCOUNTER — TELEPHONE (OUTPATIENT)
Dept: RHEUMATOLOGY | Facility: CLINIC | Age: 54
End: 2024-04-16

## 2024-04-16 ENCOUNTER — OFFICE VISIT (OUTPATIENT)
Dept: FAMILY MEDICINE CLINIC | Facility: CLINIC | Age: 54
End: 2024-04-16
Payer: COMMERCIAL

## 2024-04-16 VITALS
HEIGHT: 64 IN | SYSTOLIC BLOOD PRESSURE: 126 MMHG | TEMPERATURE: 98 F | DIASTOLIC BLOOD PRESSURE: 78 MMHG | RESPIRATION RATE: 16 BRPM | BODY MASS INDEX: 47.31 KG/M2 | HEART RATE: 68 BPM | OXYGEN SATURATION: 97 % | WEIGHT: 277.13 LBS

## 2024-04-16 DIAGNOSIS — G89.29 CHRONIC PAIN OF BOTH KNEES: ICD-10-CM

## 2024-04-16 DIAGNOSIS — E66.01 OBESITY, CLASS III, BMI 40-49.9 (MORBID OBESITY) (HCC): ICD-10-CM

## 2024-04-16 DIAGNOSIS — M48.061 SPINAL STENOSIS OF LUMBAR REGION WITHOUT NEUROGENIC CLAUDICATION: Primary | ICD-10-CM

## 2024-04-16 DIAGNOSIS — M25.562 CHRONIC PAIN OF BOTH KNEES: ICD-10-CM

## 2024-04-16 DIAGNOSIS — M15.3 OTHER SECONDARY OSTEOARTHRITIS OF MULTIPLE SITES: ICD-10-CM

## 2024-04-16 DIAGNOSIS — M25.561 CHRONIC PAIN OF BOTH KNEES: ICD-10-CM

## 2024-04-16 DIAGNOSIS — M48.061 SPINAL STENOSIS OF LUMBAR REGION, UNSPECIFIED WHETHER NEUROGENIC CLAUDICATION PRESENT: ICD-10-CM

## 2024-04-16 DIAGNOSIS — Z00.00 WELL ADULT EXAM: ICD-10-CM

## 2024-04-16 DIAGNOSIS — M19.90 OSTEOARTHRITIS, UNSPECIFIED OSTEOARTHRITIS TYPE, UNSPECIFIED SITE: ICD-10-CM

## 2024-04-16 DIAGNOSIS — R73.03 PREDIABETES: ICD-10-CM

## 2024-04-16 DIAGNOSIS — K21.00 GASTROESOPHAGEAL REFLUX DISEASE WITH ESOPHAGITIS WITHOUT HEMORRHAGE: ICD-10-CM

## 2024-04-16 DIAGNOSIS — M15.4 EROSIVE OSTEOARTHRITIS OF BOTH HANDS: ICD-10-CM

## 2024-04-16 DIAGNOSIS — M25.50 ARTHRALGIA, UNSPECIFIED JOINT: Primary | ICD-10-CM

## 2024-04-16 LAB
ALBUMIN SERPL-MCNC: 3.9 G/DL (ref 3.4–5)
ALBUMIN/GLOB SERPL: 1 {RATIO} (ref 1–2)
ALP LIVER SERPL-CCNC: 70 U/L
ALT SERPL-CCNC: 63 U/L
ANION GAP SERPL CALC-SCNC: 6 MMOL/L (ref 0–18)
AST SERPL-CCNC: 56 U/L (ref 15–37)
BASOPHILS # BLD AUTO: 0.06 X10(3) UL (ref 0–0.2)
BASOPHILS NFR BLD AUTO: 1.1 %
BILIRUB SERPL-MCNC: 0.7 MG/DL (ref 0.1–2)
BUN BLD-MCNC: 15 MG/DL (ref 9–23)
CALCIUM BLD-MCNC: 9.4 MG/DL (ref 8.5–10.1)
CHLORIDE SERPL-SCNC: 106 MMOL/L (ref 98–112)
CHOLEST SERPL-MCNC: 147 MG/DL (ref ?–200)
CO2 SERPL-SCNC: 24 MMOL/L (ref 21–32)
CREAT BLD-MCNC: 0.56 MG/DL
EGFRCR SERPLBLD CKD-EPI 2021: 109 ML/MIN/1.73M2 (ref 60–?)
EOSINOPHIL # BLD AUTO: 0.1 X10(3) UL (ref 0–0.7)
EOSINOPHIL NFR BLD AUTO: 1.8 %
ERYTHROCYTE [DISTWIDTH] IN BLOOD BY AUTOMATED COUNT: 14.1 %
EST. AVERAGE GLUCOSE BLD GHB EST-MCNC: 126 MG/DL (ref 68–126)
FASTING PATIENT LIPID ANSWER: YES
FASTING STATUS PATIENT QL REPORTED: YES
GLOBULIN PLAS-MCNC: 4 G/DL (ref 2.8–4.4)
GLUCOSE BLD-MCNC: 106 MG/DL (ref 70–99)
HBA1C MFR BLD: 6 % (ref ?–5.7)
HCT VFR BLD AUTO: 40.2 %
HDLC SERPL-MCNC: 37 MG/DL (ref 40–59)
HGB BLD-MCNC: 12.7 G/DL
IMM GRANULOCYTES # BLD AUTO: 0.04 X10(3) UL (ref 0–1)
IMM GRANULOCYTES NFR BLD: 0.7 %
LDLC SERPL CALC-MCNC: 79 MG/DL (ref ?–100)
LYMPHOCYTES # BLD AUTO: 2.07 X10(3) UL (ref 1–4)
LYMPHOCYTES NFR BLD AUTO: 37.7 %
MCH RBC QN AUTO: 26.6 PG (ref 26–34)
MCHC RBC AUTO-ENTMCNC: 31.6 G/DL (ref 31–37)
MCV RBC AUTO: 84.3 FL
MONOCYTES # BLD AUTO: 0.44 X10(3) UL (ref 0.1–1)
MONOCYTES NFR BLD AUTO: 8 %
NEUTROPHILS # BLD AUTO: 2.78 X10 (3) UL (ref 1.5–7.7)
NEUTROPHILS # BLD AUTO: 2.78 X10(3) UL (ref 1.5–7.7)
NEUTROPHILS NFR BLD AUTO: 50.7 %
NONHDLC SERPL-MCNC: 110 MG/DL (ref ?–130)
OSMOLALITY SERPL CALC.SUM OF ELEC: 283 MOSM/KG (ref 275–295)
PLATELET # BLD AUTO: 280 10(3)UL (ref 150–450)
POTASSIUM SERPL-SCNC: 4.3 MMOL/L (ref 3.5–5.1)
PROT SERPL-MCNC: 7.9 G/DL (ref 6.4–8.2)
RBC # BLD AUTO: 4.77 X10(6)UL
SODIUM SERPL-SCNC: 136 MMOL/L (ref 136–145)
TRIGL SERPL-MCNC: 183 MG/DL (ref 30–149)
VLDLC SERPL CALC-MCNC: 29 MG/DL (ref 0–30)
WBC # BLD AUTO: 5.5 X10(3) UL (ref 4–11)

## 2024-04-16 PROCEDURE — 83036 HEMOGLOBIN GLYCOSYLATED A1C: CPT | Performed by: INTERNAL MEDICINE

## 2024-04-16 PROCEDURE — 80061 LIPID PANEL: CPT | Performed by: INTERNAL MEDICINE

## 2024-04-16 PROCEDURE — 3008F BODY MASS INDEX DOCD: CPT | Performed by: INTERNAL MEDICINE

## 2024-04-16 PROCEDURE — 96127 BRIEF EMOTIONAL/BEHAV ASSMT: CPT | Performed by: INTERNAL MEDICINE

## 2024-04-16 PROCEDURE — 3078F DIAST BP <80 MM HG: CPT | Performed by: INTERNAL MEDICINE

## 2024-04-16 PROCEDURE — 80053 COMPREHEN METABOLIC PANEL: CPT | Performed by: INTERNAL MEDICINE

## 2024-04-16 PROCEDURE — 85025 COMPLETE CBC W/AUTO DIFF WBC: CPT | Performed by: INTERNAL MEDICINE

## 2024-04-16 PROCEDURE — 3074F SYST BP LT 130 MM HG: CPT | Performed by: INTERNAL MEDICINE

## 2024-04-16 PROCEDURE — 99396 PREV VISIT EST AGE 40-64: CPT | Performed by: INTERNAL MEDICINE

## 2024-04-16 RX ORDER — CALCIUM CARB/VITAMIN D3/VIT K1 500-500-40
250 TABLET,CHEWABLE ORAL DAILY
COMMUNITY

## 2024-04-16 RX ORDER — CYCLOBENZAPRINE HCL 10 MG
10 TABLET ORAL 3 TIMES DAILY PRN
COMMUNITY
Start: 2024-04-01

## 2024-04-16 RX ORDER — THIAMINE HCL 100 MG
1000 TABLET ORAL
COMMUNITY

## 2024-04-16 NOTE — PROGRESS NOTES
Signature Attestation Statement    Physician Name:   Kassidy Hughes MD     Patient Name:  Danielle Mendosa    Dates of Service:  4/16/2024      I hereby attest that the medical record entry for the above date(s) of service accurately reflects signatures/notations that I made in my capacity as an MD when I treated/diagnosed the above listed Medicare beneficiary. I do hereby attest that this information is true, accurate and complete to the best of my knowledge and I understand that any falsification, omission, or concealment of material fact may subject me to administrative, civil, or criminal liability.        Kassidy Hughes MD      04/16/24          Needs a mammo and a labs, she is on her way to weight loss.

## 2024-04-16 NOTE — PROGRESS NOTES
HPI:   Danielle Mendosa is a 53 year old female who presents for a complete physical exam. Symptoms: denies discharge, itching, burning or dysuria. Patient complains of She complains of right jaw pain for 9 days. She reports chewing gum when she felt a grinding sound to the right jaw. She reports that it feels better each day. Denies pain but reports that it is uncomfortable. Reports this happening before but was sporadic. Is eating a soft diet and avoiding food that will aggravate jaw. She reports having to constantly move jaw.     Patient complains of lower back pain that radiates to her thighs. She rates her pain as 2/10. She reports not being able to sit or stand too long. She states that this is not a new problem and is seeing Dr Hsu, a chronic pain doctor. Reports that Dr Hsu did a NutrEval testing and her body was not absorbing vitamin B. He started her on vitamin supplements, hydromorphone, and cyclobenzaprine. Reports having more energy since vitamin supplementation. Follows up with PT through pain clinic monthly. Has lost 23 pounds.     Immunization History   Administered Date(s) Administered    Covid-19 Vaccine Pfizer 30 mcg/0.3 ml 02/05/2021, 03/05/2021, 11/29/2021    Covid-19 Vaccine Pfizer Bivalent 30mcg/0.3mL 10/08/2022    Covid-19 Vaccine Pfizer Bunny-Sucrose 30 mcg/0.3 ml 04/19/2022    FLULAVAL 6 months & older 0.5 ml Prefilled syringe ([id number]) 10/15/2020, 09/29/2021    Influenza 10/08/2022, 10/18/2023    Pfizer Covid-19 Vaccine 30mcg/0.3ml 12yrs+ ([id number]) 11/01/2023    TDAP 07/29/2013     Wt Readings from Last 6 Encounters:   04/16/24 277 lb 2 oz (125.7 kg)   01/10/24 295 lb (133.8 kg)   01/04/24 293 lb (132.9 kg)   01/04/24 294 lb (133.4 kg)   11/16/23 300 lb (136.1 kg)   07/19/23 287 lb 4 oz (130.3 kg)     Body mass index is 47.57 kg/m².     Lab Results   Component Value Date    GLU 83 06/19/2023    GLU 95 04/05/2023     (H) 01/07/2022     Lab Results   Component Value Date    CHOLEST  127 04/05/2023    CHOLEST 155 01/07/2022    CHOLEST 154 02/02/2021     Lab Results   Component Value Date    HDL 38 (L) 04/05/2023    HDL 44 01/07/2022    HDL 48 02/02/2021     Lab Results   Component Value Date    LDL 63 04/05/2023    LDL 83 01/07/2022    LDL 65 02/02/2021     Lab Results   Component Value Date    AST 40 (H) 08/24/2023    AST 49 (H) 06/19/2023    AST 49 (H) 04/05/2023     Lab Results   Component Value Date    ALT 59 (H) 08/24/2023    ALT 67 (H) 06/19/2023    ALT 61 (H) 04/05/2023       Current Outpatient Medications   Medication Sig Dispense Refill    cyclobenzaprine 10 MG Oral Tab Take 1 tablet (10 mg total) by mouth 3 (three) times daily as needed.      Alpha-Lipoic Acid 200 MG Oral Tab Take 250 mg by mouth daily.      Butalbital-Acetaminophen (BUTALBITAL-APAP OR) Take by mouth as needed.      Taurine 1000 MG Oral Cap Take by mouth.      NON FORMULARY       GLUTATHIONE OR Take 1,000 mg by mouth.      Magnesium 500 MG Oral Tab Take 2 tablets (1,000 mg total) by mouth.      Magnesium-Potassium-Pyridox (PYRIDOXINE/MINERALS OR) Take by mouth.      ZINC + VITAMIN C MT Use as directed in the mouth or throat.      SPIRONOLACTONE 25 MG Oral Tab TAKE 1 TABLET (25 MG TOTAL) BY MOUTH DAILY. 90 tablet 2    LOSARTAN 25 MG Oral Tab TAKE 1 TABLET (25 MG TOTAL) BY MOUTH DAILY. 90 tablet 0    Meloxicam 15 MG Oral Tab Take 1 tablet (15 mg total) by mouth daily. 90 tablet 0    hydroxychloroquine 200 MG Oral Tab Take 1 tablet (200 mg total) by mouth 2 (two) times daily. 60 tablet 3    OMEPRAZOLE 40 MG Oral Capsule Delayed Release TAKE 1 CAPSULE (40 MG TOTAL) BY MOUTH DAILY. 90 capsule 2    ATORVASTATIN 10 MG Oral Tab TAKE 1 TABLET BY MOUTH EVERY DAY AT NIGHT 90 tablet 3    fluticasone propionate 50 MCG/ACT Nasal Suspension 2 sprays by Nasal route daily. 16 mL 0    lidocaine 4 % External Patch Place 1 patch onto the skin daily. (Patient taking differently: Place 1 patch onto the skin as needed.) 10 patch 0     Fexofenadine HCl 180 MG Oral Tab Take 1 tablet (180 mg total) by mouth daily.      Meclizine HCl 25 MG Oral Tab Take 1 tablet (25 mg total) by mouth 3 (three) times daily as needed.        Past Medical History:    Allergic rhinitis    Anesthesia complication    needing more than usual w/ C Section to go under    Arthritis    Fingers, toes, lower back, knees    Back pain    Family hisyory of back issues    Back problem    spinal stenosis    Constipation    Too far back to remember    Diarrhea, unspecified    Dizziness    Chronic vertigo    Family history of coronary artery disease    Flatulence/gas pain/belching    Food intolerance    Too much milk upsets my stomach. Cheese does not affect it    GERD (gastroesophageal reflux disease)    Headache disorder    Past 2 months headaches almost daily per dr has to do with    Hearing impairment    hearing aid right ear    Hearing loss    Hearing aid in right ear    Hearing loss in right ear    Heartburn    Sporadic    Heavy menses    Had hysterectomy in     High cholesterol    Been on atorvastatin and it is controlled    History of thyroglossal duct cyst    excised    IBS (irritable bowel syndrome)    Migraine    Migraines    past    VICENTE (obstructive sleep apnea)    AHI 41    Osteoarthritis    eval by dr Akers, [facility] [place],     Pain in joints    Fingers and knees    Problems with swallowing    Whenever i have fried foods especially    Skin blushing/flushing    Random times    Spitting up blood    when coughing up sputum in mornings I see blood sometimes    Sputum production    first thing in morning usually    Stool incontinence    Marta of the time no issues.  Sometimes I barely make it to    Wears glasses    Wheezing    With exercize      Past Surgical History:   Procedure Laterality Date          x3    Colonoscopy N/A 3/23/2021    Procedure: COLONOSCOPY;  Surgeon: Chao Gonzales MD;  Location:  ENDOSCOPY    Hysterectomy      benign    Jesús biopsy stereo  nodule 1 site right (cpt=19081)  6/2015    fibroadenoma    Other      left achilles tendon repair early 2000's    Other surgical history      thyroglossal duct cyst    Total abdom hysterectomy  1/8/2008      Family History   Problem Relation Age of Onset    Other (CAD) Father     Other (COPD) Father     Other (BLADDER CANCER) Father     Heart Attack Father         2005 to 2010    Hypertension Mother     Other (HTN) Mother     Other (OSTEOPEROSIS) Mother     Other (ALS) Mother     Uterine Cancer Mother         Age 22    Mental Disorder Son         Anxiety    Mental Disorder Son         Anxiety and depression    Mental Disorder Son         Just seen for anxiety and depression    Breast Cancer Maternal Grandmother       Social History:   Social History     Socioeconomic History    Marital status:    Tobacco Use    Smoking status: Never    Smokeless tobacco: Never   Vaping Use    Vaping status: Never Used   Substance and Sexual Activity    Alcohol use: Not Currently     Comment: occasional and rare    Drug use: No     Social Determinants of Health      [facility], [facility]    Housing Stability     Occ: post office. : . Children: yes.   Exercise: minimal.  Diet: watches sugar closely     REVIEW OF SYSTEMS:   GENERAL: feels well otherwise  SKIN: denies any unusual skin lesions  EYES:denies blurred vision or double vision  HEENT: denies nasal congestion, sinus pain or ST  LUNGS: denies shortness of breath with exertion  CARDIOVASCULAR: denies chest pain on exertion  GI: denies abdominal pain,denies heartburn  : denies dysuria, vaginal discharge or itching,periods regular   MUSCULOSKELETAL: reports back pain  NEURO: denies headaches  PSYCHE: denies depression or anxiety  HEMATOLOGIC: denies hx of anemia  ENDOCRINE: denies thyroid history  ALL/ASTHMA: denies hx of allergy or asthma    EXAM:   /78   Pulse 68   Temp 97.8 °F (36.6 °C)  (Temporal)   Resp 16   Ht 5' 4\" (1.626 m)   Wt 277 lb 2 oz (125.7 kg)   SpO2 97%   BMI 47.57 kg/m²   Body mass index is 47.57 kg/m².   GENERAL: well developed, well nourished,in no apparent distress  SKIN: no rashes,no suspicious lesions  HEENT: atraumatic, normocephalic,ears and throat are clear. Grinding of the right jaw, tight muscle   EYES:PERRLA, EOMI, normal optic disk,conjunctiva are clear  NECK: supple,no adenopathy,no bruits  CHEST: no chest tenderness  BREAST: deferred   LUNGS: clear to auscultation  CARDIO: RRR without murmur  GI: good BS's,no masses, HSM or tenderness  : deferred   RECTAL: deferred   MUSCULOSKELETAL: lumbar back is tender  EXTREMITIES: no cyanosis, clubbing or edema  NEURO: Oriented times three,cranial nerves are intact,motor and sensory are grossly intact    ASSESSMENT AND PLAN:   Danielle Mendosa is a 53 year old female who presents for a complete physical exam.  Jaw massage and jaw exercise recommended and demonstrated to patient. Order put in for mammogram and dexascan. Self breast exam explained. Health maintenance, will check fasting Lipids, CMP, and CBC. Pt referred for screening colonoscopy. Pt info handouts given for: exercise, low fat diet and breast self-exam. Pt' s weight is Body mass index is 47.57 kg/m²., recommended low fat diet and aerobic exercise 30 minutes three times weekly.  The patient indicates understanding of these issues and agrees to the plan.  The patient is asked to return for CPX in 1 year.

## 2024-04-24 RX ORDER — HYDROXYCHLOROQUINE SULFATE 200 MG/1
200 TABLET, FILM COATED ORAL 2 TIMES DAILY
Qty: 180 TABLET | Refills: 0 | Status: SHIPPED | OUTPATIENT
Start: 2024-04-24

## 2024-04-24 NOTE — TELEPHONE ENCOUNTER
Fax received from patients pharmacy. States they need new prescription for 90 day supply of hydroxychloroquine. 90 days supply required by insurance.     Pended.     LOV: 1/10/2024  Future Appointments   Date Time Provider Department Center   5/8/2024  9:30 AM Last, [facility]   LABS:  Component      Latest Ref Rng 1/11/2024 4/16/2024   WBC      4.0 - 11.0 x10(3) uL  5.5    RBC      3.80 - 5.30 x10(6)uL  4.77    Hemoglobin      12.0 - 16.0 g/dL  12.7    Hematocrit      35.0 - 48.0 %  40.2    Platelet Count      150.0 - 450.0 10(3)uL  280.0    MCV      80.0 - 100.0 fL  84.3    MCH      26.0 - 34.0 pg  26.6    MCHC      31.0 - 37.0 g/dL  31.6    RDW      %  14.1    Prelim Neutrophil Abs      1.50 - 7.70 x10 (3) uL  2.78    Neutrophils Absolute      1.50 - 7.70 x10(3) uL  2.78    Lymphocytes Absolute      1.00 - 4.00 x10(3) uL  2.07    Monocytes Absolute      0.10 - 1.00 x10(3) uL  0.44    Eosinophils Absolute      0.00 - 0.70 x10(3) uL  0.10    Basophils Absolute      0.00 - 0.20 x10(3) uL  0.06    Immature Granulocyte Absolute      0.00 - 1.00 x10(3) uL  0.04    Neutrophils %      %  50.7    Lymphocytes %      %  37.7    Monocytes %      %  8.0    Eosinophils %      %  1.8    Basophils %      %  1.1    Immature Granulocyte %      %  0.7    Glucose      70 - 99 mg/dL  106 (H)    Sodium      136 - 145 mmol/L  136    Potassium      3.5 - 5.1 mmol/L  4.3    Chloride      98 - 112 mmol/L  106    Carbon Dioxide, Total      21.0 - 32.0 mmol/L  24.0    ANION GAP      0 - 18 mmol/L  6    BUN      9 - 23 mg/dL  15    CREATININE      0.55 - 1.02 mg/dL  0.56    CALCIUM      8.5 - 10.1 mg/dL  9.4    CALCULATED OSMOLALITY      275 - 295 mOsm/kg  283    EGFR      >=60 mL/min/1.73m2  109    AST (SGOT)      15 - 37 U/L  56 (H)    ALT (SGPT)      13 - 56 U/L  63 (H)    ALKALINE PHOSPHATASE      41 - 108 U/L  70    Total Bilirubin      0.1 - 2.0 mg/dL  0.7    PROTEIN, TOTAL      6.4 - 8.2 g/dL  7.9    Albumin       3.4 - 5.0 g/dL  3.9    Globulin      2.8 - 4.4 g/dL  4.0    A/G Ratio      1.0 - 2.0   1.0    Patient Fasting for CMP?  Yes    Anti-U1RNP Antibody, IGG      <5 U/mL 1.7     Anti-RNP70 Antibody, IGG      <7 U/mL 2.3     ANTI-THYROGLOBULIN      <60 U/mL <15     ANTI-THYROPEROXIDASE      <60 U/mL <28     C-Citrullinated Peptide IgG AB      0.0 - 6.9 U/mL 1.3     Anti-dsDNA antibody      <10 IU/mL <0.6     Anti-Smith Antibody, IGG      <7 U/mL 0.8     C-REACTIVE PROTEIN      <0.30 mg/dL <0.29     SED RATE      0 - 30 mm/Hr 25     URIC ACID      2.6 - 6.0 mg/dL 4.7     Anti-SSA Antibody, IGG      <7 U/mL <0.4     Anti-SSB Antibody, IGG      <7 U/mL <0.4     TSH      0.358 - 3.740 mIU/mL 1.320     CK      26 - 192 U/L 161        Legend:  (H) High

## 2024-05-13 RX ORDER — LOSARTAN POTASSIUM 25 MG/1
25 TABLET ORAL DAILY
Qty: 90 TABLET | Refills: 0 | Status: SHIPPED | OUTPATIENT
Start: 2024-05-13 | End: 2024-08-11

## 2024-05-13 NOTE — TELEPHONE ENCOUNTER
Last refill: 03/22/24  Qty: 90  W/ 0 refills  Last ov: 04/16/24    Requested Prescriptions     Pending Prescriptions Disp Refills    LOSARTAN 25 MG Oral Tab [Pharmacy Med Name: LOSARTAN POTASSIUM 25 MG TAB] 90 tablet 0     Sig: TAKE 1 TABLET (25 MG TOTAL) BY MOUTH DAILY.     Future Appointments   Date Time Provider Department Center   8/7/2024  9:00 AM Pamela De La Cruz APRN [facility]

## 2024-06-03 DIAGNOSIS — M48.061 SPINAL STENOSIS OF LUMBAR REGION, UNSPECIFIED WHETHER NEUROGENIC CLAUDICATION PRESENT: ICD-10-CM

## 2024-06-03 DIAGNOSIS — M15.4 EROSIVE OSTEOARTHRITIS OF BOTH HANDS: ICD-10-CM

## 2024-06-03 DIAGNOSIS — G89.29 CHRONIC PAIN OF BOTH KNEES: ICD-10-CM

## 2024-06-03 DIAGNOSIS — M19.90 OSTEOARTHRITIS, UNSPECIFIED OSTEOARTHRITIS TYPE, UNSPECIFIED SITE: ICD-10-CM

## 2024-06-03 DIAGNOSIS — M25.562 CHRONIC PAIN OF BOTH KNEES: ICD-10-CM

## 2024-06-03 DIAGNOSIS — M25.561 CHRONIC PAIN OF BOTH KNEES: ICD-10-CM

## 2024-06-03 RX ORDER — MELOXICAM 15 MG/1
15 TABLET ORAL DAILY
Qty: 90 TABLET | Refills: 0 | OUTPATIENT
Start: 2024-06-03

## 2024-06-03 NOTE — TELEPHONE ENCOUNTER
LOV:   01/10/2024    Future Appointments   Date Time Provider Department Center   8/7/2024  9:00 AM Last, [facility]       LF:  03/11/2024    QTY:   90    Refills:   0    LABS:      Component      Latest Ref Rng 4/16/2024   WBC      4.0 - 11.0 x10(3) uL 5.5    RBC      3.80 - 5.30 x10(6)uL 4.77    Hemoglobin      12.0 - 16.0 g/dL 12.7    Hematocrit      35.0 - 48.0 % 40.2    Platelet Count      150.0 - 450.0 10(3)uL 280.0    MCV      80.0 - 100.0 fL 84.3    MCH      26.0 - 34.0 pg 26.6    MCHC      31.0 - 37.0 g/dL 31.6    RDW      % 14.1    Prelim Neutrophil Abs      1.50 - 7.70 x10 (3) uL 2.78    Neutrophils Absolute      1.50 - 7.70 x10(3) uL 2.78    Lymphocytes Absolute      1.00 - 4.00 x10(3) uL 2.07    Monocytes Absolute      0.10 - 1.00 x10(3) uL 0.44    Eosinophils Absolute      0.00 - 0.70 x10(3) uL 0.10    Basophils Absolute      0.00 - 0.20 x10(3) uL 0.06    Immature Granulocyte Absolute      0.00 - 1.00 x10(3) uL 0.04    Neutrophils %      % 50.7    Lymphocytes %      % 37.7    Monocytes %      % 8.0    Eosinophils %      % 1.8    Basophils %      % 1.1    Immature Granulocyte %      % 0.7    Glucose      70 - 99 mg/dL 106 (H)    Sodium      136 - 145 mmol/L 136    Potassium      3.5 - 5.1 mmol/L 4.3    Chloride      98 - 112 mmol/L 106    Carbon Dioxide, Total      21.0 - 32.0 mmol/L 24.0    ANION GAP      0 - 18 mmol/L 6    BUN      9 - 23 mg/dL 15    CREATININE      0.55 - 1.02 mg/dL 0.56    CALCIUM      8.5 - 10.1 mg/dL 9.4    CALCULATED OSMOLALITY      275 - 295 mOsm/kg 283    EGFR      >=60 mL/min/1.73m2 109    AST (SGOT)      15 - 37 U/L 56 (H)    ALT (SGPT)      13 - 56 U/L 63 (H)    ALKALINE PHOSPHATASE      41 - 108 U/L 70    Total Bilirubin      0.1 - 2.0 mg/dL 0.7    PROTEIN, TOTAL      6.4 - 8.2 g/dL 7.9    Albumin      3.4 - 5.0 g/dL 3.9    Globulin      2.8 - 4.4 g/dL 4.0    A/G Ratio      1.0 - 2.0  1.0    Patient Fasting for CMP? Yes       Legend:  (H) High

## 2024-06-03 NOTE — TELEPHONE ENCOUNTER
Spoke to patient regarding the below message:    LFTs are elevated would hold the meloxicam avoid alcohol and NSAIDs     Patient is asking when she can repeat her LFTs and when Dr wants to see her again for f/u? Please advise

## 2024-06-04 NOTE — TELEPHONE ENCOUNTER
Spoke to patient regarding the below message:    Looks like she is followed by pain management and she was on gabapentin and NSAIDs through them    X-rays show changes of arthritis nothing autoimmune so far    It looks like I proceeded with an MRI of the hand did she get this done?  If that shows anything more concerning then I have more options if she has any inflammatory arthritis.    Get the MRI of the hands scheduled?    If that is normal then once a year to make sure nothing is changed and then as needed     Patient verbalized understanding and denied any questions at this time. Patient states that she had her MRI done and Dr reviewed it. Dr states that she did review patient's MRI and patient was placed on Hydroxychloroquine as a trial.  states that she would like to see patient back in the office in 4 months to see how she is doing on this medication. Patient was sent a [employer] message with this information in it and asked to call the office back with any questions she may have

## 2024-07-02 ENCOUNTER — LABORATORY ENCOUNTER (OUTPATIENT)
Dept: LAB | Age: 54
End: 2024-07-02
Attending: INTERNAL MEDICINE
Payer: COMMERCIAL

## 2024-07-02 DIAGNOSIS — M25.50 ARTHRALGIA, UNSPECIFIED JOINT: ICD-10-CM

## 2024-07-02 LAB
CRP SERPL-MCNC: <0.29 MG/DL (ref ?–0.3)
ERYTHROCYTE [SEDIMENTATION RATE] IN BLOOD: 16 MM/HR

## 2024-07-02 PROCEDURE — 85652 RBC SED RATE AUTOMATED: CPT | Performed by: INTERNAL MEDICINE

## 2024-07-02 PROCEDURE — 86140 C-REACTIVE PROTEIN: CPT | Performed by: INTERNAL MEDICINE

## 2024-07-10 ENCOUNTER — PATIENT MESSAGE (OUTPATIENT)
Dept: FAMILY MEDICINE CLINIC | Facility: CLINIC | Age: 54
End: 2024-07-10

## 2024-07-10 NOTE — TELEPHONE ENCOUNTER
From: Danielle Mendosa  To: Kassidy Hughes  Sent: 7/10/2024 9:51 AM CDT  Subject: Blood pressure medication    When I went on Losartan I was told in combination with my spironolactone I had to watch my potassium. I have been researching fruits and veggies and find I can't/shouldn't have very many of them. I am finding conflicting articles about which ones are lower in potassium. Since I am down 33 lbs now my BP the last few times has been 127/75 sumaya. Can I stop the losartan or can we switch it to one that might allow less restrictions on potassium?   Phone number not in service unable to contact patient, and there is no alternative number to call.

## 2024-08-07 ENCOUNTER — LAB ENCOUNTER (OUTPATIENT)
Dept: LAB | Age: 54
End: 2024-08-07
Payer: COMMERCIAL

## 2024-08-07 DIAGNOSIS — R74.8 ELEVATED LIVER ENZYMES: ICD-10-CM

## 2024-08-07 DIAGNOSIS — K76.0 HEPATIC STEATOSIS: ICD-10-CM

## 2024-08-07 LAB
ALBUMIN SERPL-MCNC: 4.5 G/DL (ref 3.2–4.8)
ALP LIVER SERPL-CCNC: 71 U/L
ALT SERPL-CCNC: 44 U/L
AST SERPL-CCNC: 45 U/L (ref ?–34)
BILIRUB DIRECT SERPL-MCNC: 0.3 MG/DL (ref ?–0.3)
BILIRUB SERPL-MCNC: 0.7 MG/DL (ref 0.3–1.2)
PROT SERPL-MCNC: 7.4 G/DL (ref 5.7–8.2)

## 2024-08-07 PROCEDURE — 36415 COLL VENOUS BLD VENIPUNCTURE: CPT

## 2024-08-07 PROCEDURE — 80076 HEPATIC FUNCTION PANEL: CPT

## 2024-08-13 ENCOUNTER — E-ADVICE (OUTPATIENT)
Dept: ENDOCRINOLOGY | Age: 54
End: 2024-08-13

## 2024-08-13 ENCOUNTER — APPOINTMENT (OUTPATIENT)
Dept: ENDOCRINOLOGY | Age: 54
End: 2024-08-13

## 2024-08-13 VITALS
WEIGHT: 262 LBS | SYSTOLIC BLOOD PRESSURE: 138 MMHG | HEIGHT: 64 IN | OXYGEN SATURATION: 96 % | HEART RATE: 79 BPM | DIASTOLIC BLOOD PRESSURE: 80 MMHG | BODY MASS INDEX: 44.73 KG/M2

## 2024-08-13 DIAGNOSIS — E66.01 OBESITY, CLASS III, BMI 40-49.9 (MORBID OBESITY)  (CMD): Primary | ICD-10-CM

## 2024-08-13 PROCEDURE — 99203 OFFICE O/P NEW LOW 30 MIN: CPT | Performed by: NURSE PRACTITIONER

## 2024-08-13 RX ORDER — TYROSINE 500 MG
CAPSULE ORAL
COMMUNITY

## 2024-08-13 RX ORDER — GABAPENTIN 300 MG/1
CAPSULE ORAL
COMMUNITY
Start: 2024-07-29

## 2024-08-13 RX ORDER — HYDROMORPHONE HYDROCHLORIDE 4 MG/1
TABLET ORAL
COMMUNITY
Start: 2024-07-12

## 2024-08-13 RX ORDER — ATORVASTATIN CALCIUM 10 MG/1
10 TABLET, FILM COATED ORAL NIGHTLY
COMMUNITY
Start: 2024-05-31

## 2024-08-13 RX ORDER — LOSARTAN POTASSIUM 25 MG/1
25 TABLET ORAL DAILY
COMMUNITY
Start: 2024-06-20

## 2024-08-13 RX ORDER — BUTALBITAL
POWDER (GRAM) MISCELLANEOUS
COMMUNITY

## 2024-08-13 RX ORDER — TIRZEPATIDE 2.5 MG/.5ML
2.5 INJECTION, SOLUTION SUBCUTANEOUS
Qty: 2 ML | Refills: 0 | Status: SHIPPED | OUTPATIENT
Start: 2024-08-13

## 2024-08-13 RX ORDER — CYCLOBENZAPRINE HCL 10 MG
TABLET ORAL
COMMUNITY
Start: 2024-06-03

## 2024-08-13 RX ORDER — TIRZEPATIDE 2.5 MG/.5ML
2.5 INJECTION, SOLUTION SUBCUTANEOUS
Qty: 2 ML | Refills: 1 | Status: SHIPPED | OUTPATIENT
Start: 2024-08-13 | End: 2024-08-13 | Stop reason: DRUGHIGH

## 2024-08-13 RX ORDER — SPIRONOLACTONE 25 MG/1
TABLET ORAL
COMMUNITY

## 2024-08-13 RX ORDER — OMEPRAZOLE 40 MG/1
CAPSULE, DELAYED RELEASE ORAL
COMMUNITY
Start: 2024-08-08

## 2024-08-15 ENCOUNTER — TELEPHONE (OUTPATIENT)
Facility: LOCATION | Age: 54
End: 2024-08-15

## 2024-08-15 NOTE — TELEPHONE ENCOUNTER
Patient requesting a refill of meclozine, patient has made appointment  and is inquiring if she can get enough medication until then as she is almost out.

## 2024-08-16 ENCOUNTER — TELEPHONE (OUTPATIENT)
Facility: LOCATION | Age: 54
End: 2024-08-16

## 2024-08-16 RX ORDER — MECLIZINE HYDROCHLORIDE 25 MG/1
25 TABLET ORAL 3 TIMES DAILY PRN
Qty: 180 TABLET | Refills: 0 | Status: SHIPPED | OUTPATIENT
Start: 2024-08-16

## 2024-08-20 ENCOUNTER — OFFICE VISIT (OUTPATIENT)
Dept: FAMILY MEDICINE CLINIC | Facility: CLINIC | Age: 54
End: 2024-08-20
Payer: COMMERCIAL

## 2024-08-20 VITALS
SYSTOLIC BLOOD PRESSURE: 138 MMHG | HEIGHT: 64 IN | HEART RATE: 76 BPM | DIASTOLIC BLOOD PRESSURE: 80 MMHG | OXYGEN SATURATION: 96 % | TEMPERATURE: 98 F | RESPIRATION RATE: 16 BRPM | WEIGHT: 259 LBS | BODY MASS INDEX: 44.22 KG/M2

## 2024-08-20 DIAGNOSIS — R42 VERTIGO: Primary | ICD-10-CM

## 2024-08-20 DIAGNOSIS — R11.0 NAUSEA: ICD-10-CM

## 2024-08-20 DIAGNOSIS — M54.40 CHRONIC BILATERAL LOW BACK PAIN WITH SCIATICA, SCIATICA LATERALITY UNSPECIFIED: ICD-10-CM

## 2024-08-20 DIAGNOSIS — G89.29 CHRONIC BILATERAL LOW BACK PAIN WITH SCIATICA, SCIATICA LATERALITY UNSPECIFIED: ICD-10-CM

## 2024-08-20 DIAGNOSIS — E66.01 OBESITY, CLASS III, BMI 40-49.9 (MORBID OBESITY) (HCC): ICD-10-CM

## 2024-08-20 DIAGNOSIS — I10 PRIMARY HYPERTENSION: ICD-10-CM

## 2024-08-20 PROCEDURE — 99214 OFFICE O/P EST MOD 30 MIN: CPT | Performed by: INTERNAL MEDICINE

## 2024-08-20 PROCEDURE — 3075F SYST BP GE 130 - 139MM HG: CPT | Performed by: INTERNAL MEDICINE

## 2024-08-20 PROCEDURE — 3008F BODY MASS INDEX DOCD: CPT | Performed by: INTERNAL MEDICINE

## 2024-08-20 PROCEDURE — 3079F DIAST BP 80-89 MM HG: CPT | Performed by: INTERNAL MEDICINE

## 2024-08-20 RX ORDER — ONDANSETRON 4 MG/1
4 TABLET, FILM COATED ORAL EVERY 8 HOURS PRN
Qty: 20 TABLET | Refills: 2 | Status: SHIPPED | OUTPATIENT
Start: 2024-08-20

## 2024-08-20 NOTE — PROGRESS NOTES
Danielle Mendosa is a 53 year old female.  HPI:   Pt has been having bouts of vertigo x 4 in the last 10 days.  She is using spironolactone. Has an appt next Wednesday with Dr Ludwig. Using a weight loss program though the [employer] so she has not yet started Zepbound.  SHe is trying to get to 200 pounds to help with chronic back pain.  Her joints have been better due to bed rest for the vertigo. Hands are very painful, she is off the meloxicam, but taking plaquenil and hydromorphone. She is OFF GABAPENTIN. She has only taken BP meds twice this week due to inability to eat.    Current Outpatient Medications   Medication Sig Dispense Refill    ondansetron (ZOFRAN) 4 mg tablet Take 1 tablet (4 mg total) by mouth every 8 (eight) hours as needed for Nausea. 20 tablet 2    meclizine 25 MG Oral Tab Take 1 tablet (25 mg total) by mouth 3 (three) times daily as needed. 180 tablet 0    HYDROmorphone 4 MG Oral Tab Take 1 tablet (4 mg total) by mouth every 6 (six) hours as needed.      Vitamin B-1 100 MG Oral Tab       Vitamin D, Cholecalciferol, 50 MCG (2000 UT) Oral Cap       Ascorbic Acid (VITAMIN C) 100 MG Oral Tab       Omega-3 1000 MG Oral Cap       Omeprazole 40 MG Oral Capsule Delayed Release Take 1 capsule (40 mg total) by mouth daily. 90 capsule 3    hydroxychloroquine 200 MG Oral Tab Take 1 tablet (200 mg total) by mouth 2 (two) times daily. 180 tablet 0    cyclobenzaprine 10 MG Oral Tab Take 1 tablet (10 mg total) by mouth 3 (three) times daily as needed.      Alpha-Lipoic Acid 200 MG Oral Tab Take 250 mg by mouth daily.      Butalbital-Acetaminophen (BUTALBITAL-APAP OR) Take by mouth as needed.      Taurine 1000 MG Oral Cap Take by mouth.      GLUTATHIONE OR Take 1,000 mg by mouth.      Magnesium 500 MG Oral Tab Take 2 tablets (1,000 mg total) by mouth.      Magnesium-Potassium-Pyridox (PYRIDOXINE/MINERALS OR) Take by mouth.      ZINC + VITAMIN C MT Use as directed in the mouth or throat.      SPIRONOLACTONE 25 MG Oral  Tab TAKE 1 TABLET (25 MG TOTAL) BY MOUTH DAILY. 90 tablet 2    ATORVASTATIN 10 MG Oral Tab TAKE 1 TABLET BY MOUTH EVERY DAY AT NIGHT 90 tablet 3    fluticasone propionate 50 MCG/ACT Nasal Suspension 2 sprays by Nasal route daily. 16 mL 0    lidocaine 4 % External Patch Place 1 patch onto the skin daily. (Patient taking differently: Place 1 patch onto the skin as needed.) 10 patch 0    Fexofenadine HCl 180 MG Oral Tab Take 1 tablet (180 mg total) by mouth daily.      gabapentin 300 MG Oral Cap Take 1 capsule (300 mg total) by mouth 3 (three) times daily. (Patient not taking: Reported on 8/20/2024)        Past Medical History:    Allergic rhinitis    Anesthesia complication    needing more than usual w/ C Section to go under    Arthritis    Fingers, toes, lower back, knees    Back pain    Family hisyory of back issues    Back problem    spinal stenosis    Constipation    Too far back to remember    Diarrhea, unspecified    Dizziness    Chronic vertigo    Family history of coronary artery disease    Flatulence/gas pain/belching    Food intolerance    Too much milk upsets my stomach. Cheese does not affect it    GERD (gastroesophageal reflux disease)    Headache disorder    Past 2 months headaches almost daily per dr has to do with    Hearing impairment    hearing aid right ear    Hearing loss    Hearing aid in right ear    Hearing loss in right ear    Heartburn    Sporadic    Heavy menses    Had hysterectomy in 2008    High cholesterol    Been on atorvastatin and it is controlled    History of thyroglossal duct cyst    excised    IBS (irritable bowel syndrome)    Migraine    Migraines    past    VICENTE (obstructive sleep apnea)    AHI 41    Oste[place]    [place] [facility] [place]    Pain in joints    Fingers and knees    Problems with swallowing    Whenever i have fried foods especially    Skin blushing/flushing    Random times    Spitting up blood    when coughing up sputum in mornings I see blood sometimes     Sputum production    first thing in morning usually    Stool incontinence    Marta of the time no issues.  Sometimes I barely make it to    Wears glasses    Wheezing    With exercize      Social History:  Social History     Socioeconomic History    Marital status:    Tobacco Use    Smoking status: Never    Smokeless tobacco: Never   Vaping Use    Vaping status: Never Used   Substance and Sexual Activity    Alcohol use: Not Currently     Comment: occasional and rare    Drug use: No     Social Determinants of Health      [facility]    Housing Stability        REVIEW OF SYSTEMS:   GENERAL HEALTH: feels well otherwise  SKIN: denies any unusual skin lesions or rashes  RESPIRATORY: denies shortness of breath with exertion  CARDIOVASCULAR: denies chest pain on exertion  GI: denies abdominal pain and denies heartburn  NEURO: denies headaches, vertigo when she turns her head    EXAM:   /80   Pulse 76   Temp 97.9 °F (36.6 °C) (Temporal)   Resp 16   Ht 5' 4\" (1.626 m)   Wt 259 lb (117.5 kg)   SpO2 96%   BMI 44.46 kg/m²   GENERAL: well developed, well nourished,in no apparent distress  SKIN: no rashes,no suspicious lesions  HEENT: atraumatic, normocephalic,ears and throat are clear  NECK: supple,no adenopathy,no bruits  LUNGS: clear to auscultation  CARDIO: RRR without murmur  EXTREMITIES: no cyanosis, clubbing or edema    ASSESSMENT AND PLAN:     Encounter Diagnoses   Name Primary?    Vertigo Yes    Nausea     Obesity, Class III, BMI 40-49.9 (morbid obesity) (HCC)     Chronic bilateral low back pain with sciatica, sciatica laterality unspecified     Primary hypertension    Not in usual state of health, she will use zofran so she can take meds and eat a little more, she is losing weight on her own, but is interested in taking a GLP1.  Her pain is controlled and she has an appt the ENT this week for the vertigo. Needs continued core  strengthening,     No orders of the defined types were placed in this encounter.      Meds & Refills for this Visit:  Requested Prescriptions     Signed Prescriptions Disp Refills    ondansetron (ZOFRAN) 4 mg tablet 20 tablet 2     Sig: Take 1 tablet (4 mg total) by mouth every 8 (eight) hours as needed for Nausea.       Imaging & Consults:  None    Follow up needed.     The patient indicates understanding of these issues and agrees to the plan.

## 2024-08-28 ENCOUNTER — OFFICE VISIT (OUTPATIENT)
Facility: LOCATION | Age: 54
End: 2024-08-28
Payer: COMMERCIAL

## 2024-08-28 DIAGNOSIS — H81.01 MENIERE'S DISEASE OF RIGHT EAR: Primary | ICD-10-CM

## 2024-08-28 DIAGNOSIS — H93.293 ABNORMAL AUDITORY PERCEPTION OF BOTH EARS: Primary | ICD-10-CM

## 2024-08-28 PROCEDURE — 99214 OFFICE O/P EST MOD 30 MIN: CPT | Performed by: OTOLARYNGOLOGY

## 2024-08-28 PROCEDURE — 92567 TYMPANOMETRY: CPT | Performed by: AUDIOLOGIST

## 2024-08-28 PROCEDURE — 92557 COMPREHENSIVE HEARING TEST: CPT | Performed by: AUDIOLOGIST

## 2024-08-28 RX ORDER — SPIRONOLACTONE 25 MG/1
25 TABLET ORAL 2 TIMES DAILY
Qty: 90 TABLET | Refills: 1 | Status: SHIPPED | OUTPATIENT
Start: 2024-08-28

## 2024-08-28 RX ORDER — METHYLPREDNISOLONE 4 MG
TABLET, DOSE PACK ORAL
Qty: 21 TABLET | Refills: 0 | Status: SHIPPED | OUTPATIENT
Start: 2024-08-28

## 2024-08-28 NOTE — PROGRESS NOTES
Danielle Mendosa is a 53 year old female.   Chief Complaint   Patient presents with    Dizziness     HPI:   53-year-old white female I saw her back in 2021 diagnosed her with Ménière's she was having episodic vertigo she was placed on Aldactone 25 mg she had complete resolution of the vertigo lasted until now something seems to have triggered and she has been getting frequent episodes of vertigo.  She has asymmetric sensorineural hearing loss and wears a hearing aid in the right ear that is been stable.  Current Outpatient Medications   Medication Sig Dispense Refill    ondansetron (ZOFRAN) 4 mg tablet Take 1 tablet (4 mg total) by mouth every 8 (eight) hours as needed for Nausea. 20 tablet 2    meclizine 25 MG Oral Tab Take 1 tablet (25 mg total) by mouth 3 (three) times daily as needed. 180 tablet 0    HYDROmorphone 4 MG Oral Tab Take 1 tablet (4 mg total) by mouth every 6 (six) hours as needed.      gabapentin 300 MG Oral Cap Take 1 capsule (300 mg total) by mouth 3 (three) times daily. (Patient not taking: Reported on 8/20/2024)      Vitamin B-1 100 MG Oral Tab       Vitamin D, Cholecalciferol, 50 MCG (2000 UT) Oral Cap       Ascorbic Acid (VITAMIN C) 100 MG Oral Tab       Omega-3 1000 MG Oral Cap       Omeprazole 40 MG Oral Capsule Delayed Release Take 1 capsule (40 mg total) by mouth daily. 90 capsule 3    hydroxychloroquine 200 MG Oral Tab Take 1 tablet (200 mg total) by mouth 2 (two) times daily. 180 tablet 0    cyclobenzaprine 10 MG Oral Tab Take 1 tablet (10 mg total) by mouth 3 (three) times daily as needed.      Alpha-Lipoic Acid 200 MG Oral Tab Take 250 mg by mouth daily.      Butalbital-Acetaminophen (BUTALBITAL-APAP OR) Take by mouth as needed.      Taurine 1000 MG Oral Cap Take by mouth.      GLUTATHIONE OR Take 1,000 mg by mouth.      Magnesium 500 MG Oral Tab Take 2 tablets (1,000 mg total) by mouth.      Magnesium-Potassium-Pyridox (PYRIDOXINE/MINERALS OR) Take by mouth.      ZINC + VITAMIN C MT  Use as directed in the mouth or throat.      SPIRONOLACTONE 25 MG Oral Tab TAKE 1 TABLET (25 MG TOTAL) BY MOUTH DAILY. 90 tablet 2    ATORVASTATIN 10 MG Oral Tab TAKE 1 TABLET BY MOUTH EVERY DAY AT NIGHT 90 tablet 3    fluticasone propionate 50 MCG/ACT Nasal Suspension 2 sprays by Nasal route daily. 16 mL 0    lidocaine 4 % External Patch Place 1 patch onto the skin daily. (Patient taking differently: Place 1 patch onto the skin as needed.) 10 patch 0    Fexofenadine HCl 180 MG Oral Tab Take 1 tablet (180 mg total) by mouth daily.        Past Medical History:    Allergic rhinitis    Anesthesia complication    needing more than usual w/ C Section to go under    Arthritis    Fingers, toes, lower back, knees    Back pain    Family hisyory of back issues    Back problem    spinal stenosis    Constipation    Too far back to remember    Diarrhea, unspecified    Dizziness    Chronic vertigo    Family history of coronary artery disease    Flatulence/gas pain/belching    Food intolerance    Too much milk upsets my stomach. Cheese does not affect it    GERD (gastroesophageal reflux disease)    Headache disorder    Past 2 months headaches almost daily per dr has to do with    Hearing impairment    hearing aid right ear    Hearing loss    Hearing aid in right ear    Hearing loss in right ear    Heartburn    Sporadic    Heavy menses    Had hysterectomy in 2008    High cholesterol    Been on atorvastatin and it is controlled    History of thyroglossal duct cyst    excised    IBS (irritable bowel syndrome)    Migraine    Migraines    past    VICENTE (obstructive sleep apnea)    AHI 41    Osteoarthritis    eval by dr Akers, [facility] [place], 2010    Pain in joints    Fingers and knees    Problems with swallowing    Whenever i have fried foods especially    Skin blushing/flushing    Random times    Spitting up blood    when coughing up sputum in mornings I see blood sometimes    Sputum production    first thing in morning usually    Stool  incontinence    Marta of the time no issues.  Sometimes I barely make it to    Wears glasses    Wheezing    With exercize      Social History:  Social History     Socioeconomic History    Marital status:    Tobacco Use    Smoking status: Never    Smokeless tobacco: Never   Vaping Use    Vaping status: Never Used   Substance and Sexual Activity    Alcohol use: Not Currently     Comment: occasional and rare    Drug use: No     Social Determinants of Health      [facility]    Housing Stability      Past Surgical History:   Procedure Laterality Date          x3    Colonoscopy N/A 3/23/2021    Procedure: COLONOSCOPY;  Surgeon: Chao Gonzales MD;  Location:  ENDOSCOPY    Hysterectomy      benign    Jesús biopsy stereo nodule 1 site right (cpt=19081)  2015    fibroadenoma    Other      left achilles tendon repair early     Other surgical history      thyroglossal duct cyst    Total abdom hysterectomy  2008         REVIEW OF SYSTEMS:   GENERAL HEALTH: feels well otherwise  GENERAL : denies fever, chills, sweats, weight loss, weight gain  SKIN: denies any unusual skin lesions or rashes  RESPIRATORY: denies shortness of breath with exertion  NEURO: denies headaches    EXAM:   There were no vitals taken for this visit.    System Pertinent findings Details   Constitutional  Overall appearance - Normal.   Head/Face  Facial features -- Normal. Skull - Normal.   Eyes  Pupils equal ,round ,react to light and accomidate   Ears  External Ear Right: Normal, Left: Normal. Canal - Right: Normal, Left: Normal. TM - Right: Normal left: Normal   Nose  External Nose, Normal, Septum -midline,Nasal Vault, clear. Turbinates - Right: Normal left: Normal   Mouth/Throat  Lips/teeth/gums - Normal. Tonsils -1+ oropharynx - Normal.   Neck Exam  Inspection - Normal. Palpation - Normal. Parotid gland - Normal. Thyroid gland -normal   Lymph Detail   Submental. Submandibular. Anterior cervical. Posterior cervical. Supraclavicular.   Audiogram today shows left ear normal mixed hearing loss in the right ear    ASSESSMENT AND PLAN:   1. Meniere's disease of right ear  Medrol 4 mg Dosepak use as directed  Aldactone 25 mg increasing to twice daily until vertigo resolves resolves itself  Not improved in 1 month will follow-up with pre-clinic audiogram      The patient indicates understanding of these issues and agrees to the plan.      Per Ludwig MD  8/28/2024  3:14 PM

## 2024-08-28 NOTE — PROGRESS NOTES
Danielle Mendosa was seen for audiometric evaluation today.  Referred back to physician.     Ling Castellon

## 2024-09-10 ENCOUNTER — OFFICE VISIT (OUTPATIENT)
Dept: RHEUMATOLOGY | Facility: CLINIC | Age: 54
End: 2024-09-10
Payer: COMMERCIAL

## 2024-09-10 VITALS
BODY MASS INDEX: 45.24 KG/M2 | TEMPERATURE: 98 F | HEART RATE: 80 BPM | SYSTOLIC BLOOD PRESSURE: 122 MMHG | RESPIRATION RATE: 16 BRPM | OXYGEN SATURATION: 98 % | HEIGHT: 64 IN | WEIGHT: 265 LBS | DIASTOLIC BLOOD PRESSURE: 62 MMHG

## 2024-09-10 DIAGNOSIS — M15.4 EROSIVE OSTEOARTHRITIS OF BOTH HANDS: ICD-10-CM

## 2024-09-10 DIAGNOSIS — G89.29 CHRONIC PAIN OF BOTH KNEES: ICD-10-CM

## 2024-09-10 DIAGNOSIS — G89.4 CHRONIC PAIN SYNDROME: ICD-10-CM

## 2024-09-10 DIAGNOSIS — M25.562 CHRONIC PAIN OF BOTH KNEES: ICD-10-CM

## 2024-09-10 DIAGNOSIS — M15.0 PRIMARY OSTEOARTHRITIS INVOLVING MULTIPLE JOINTS: Primary | ICD-10-CM

## 2024-09-10 DIAGNOSIS — M25.561 CHRONIC PAIN OF BOTH KNEES: ICD-10-CM

## 2024-09-10 PROCEDURE — 3074F SYST BP LT 130 MM HG: CPT | Performed by: INTERNAL MEDICINE

## 2024-09-10 PROCEDURE — 3008F BODY MASS INDEX DOCD: CPT | Performed by: INTERNAL MEDICINE

## 2024-09-10 PROCEDURE — 3078F DIAST BP <80 MM HG: CPT | Performed by: INTERNAL MEDICINE

## 2024-09-10 PROCEDURE — 99214 OFFICE O/P EST MOD 30 MIN: CPT | Performed by: INTERNAL MEDICINE

## 2024-09-10 NOTE — PATIENT INSTRUCTIONS
OSTEOARTHRITIS    Fast Facts    Though some of the joint changes are irreversible, most patients will not need joint replacement surgery.    OA symptoms (what you feel) can vary greatly among patients.    A rheumatologist can detect arthritis and prescribe the proper treatment. The goal of treatment in OA is to reduce pain and improve function.    Exercise is an important part of OA treatment, because it can decrease joint pain and improve function.    At present, there is no treatment that can reverse the damage of OA in the joints. Researchers are trying to find ways to slow or reverse this joint damage.    Osteoarthritis (also known as OA) is a common joint disease that most often affects middle-age to elderly people. It is commonly referred to as \"wear and tear\" of the joints, but we now know that OA is a disease of the entire joint, involving the cartilage, joint lining, ligaments, and bone. Although it is more common in older people, it is not really accurate to say that the joints are just \"wearing out.\" It is characterized by breakdown of the cartilage (the tissue that cushions the ends of the bones between joints), bony changes of the joints, deterioration of tendons and ligaments, and various degrees of inflammation of the joint lining (called the synovium).    This arthritis tends to occur in the hand joints, spine, hips, knees, and great toes. The lifetime risk of developing OA of the knee is about 46%, and the lifetime risk of developing OA of the hip is 25%, according to the [facility] Osteoarthritis Project, a long-term study from the [facility] and sponsored by the Centers for Disease Control and Prevention (often called the CDC) and the National Institutes of Health.    OA is a top cause of disability in older people. The goal of osteoarthritis treatment is to reduce pain and improve function. There is no cure for the disease, but some treatments attempt to slow disease  progression.         What is osteoarthritis?    OA is a frequently slowly progressive joint disease typically seen in middle-aged to elderly people. In osteoarthritis, the cartilage between the bones in the joint breaks down. This causes the affected bones to slowly get bigger. The joint cartilage often breaks down because of mechanical stress or biochemical changes within the body, causing the bone underneath to fail. OA can occur together with other types of arthritis, such as gout or rheumatoid arthritis.    OA tends to affect commonly used joints such as the hands and spine, and the weight-bearing joints such as the hips and knees. Symptoms include:    Joint pain and stiffness    Knobby swelling at the joint    Cracking or grinding noise with joint movement    Decreased function of the joint    How do you treat osteoarthritis?    There is no proven treatment yet that can reverse joint damage from OA. The goal of osteoarthritis treatment is to reduce pain and improve function of the affected joints. Most often, this is possible with a mixture of physical measures and drug therapy and, sometimes, surgery.    Physical measures: Weight loss and exercise are useful in OA. Excess weight puts stress on your knee joints and hips and low back. For every 10 pounds of weight you lose over 10 years, you can reduce the chance of developing knee OA by up to 50 percent. Exercise can improve your muscle strength, decrease joint pain and stiffness, and lower the chance of disability due to OA. Also helpful are support (\"assistive\") devices, such as orthotics or a walking cane, that help you do daily activities. Heat or cold therapy can help relieve OA symptoms for a short time.    Certain alternative treatments such as spa (hot tub), massage, and chiropractic manipulation can help relieve pain for a short time. They can be costly, though, and require repeated treatments. Also, the long-term benefits of these alternative  (sometimes called complementary or integrative) medicine treatments are unproven but are under study.    Drug therapy: Forms of drug therapy include topical, oral (by mouth) and injections (shots). You apply topical drugs directly on the skin over the affected joints. These medicines include capsaicin cream, lidocaine and diclofenac gel. Oral pain relievers such as acetaminophen are common first treatments. So are nonsteroidal anti-inflammatory drugs (often called NSAIDs), which decrease swelling and pain.    In 2010, the government (FDA) approved the use of duloxetine (Cymbalta) for chronic (long-term) musculoskeletal pain including from OA. This oral drug is not new. It also is in use for other health concerns, such as mood disorders, nerve pain and fibromyalgia.    Patients with more serious pain may need stronger medications, such as prescription narcotics.    Joint injections with corticosteroids (sometimes called cortisone shots) or with a form of lubricant called hyaluronic acid can give months of pain relief from OA. This lubricant is given in the knee, and these shots may help delay the need for a knee replacement by a few years in some patients.    Surgery: Surgical treatment becomes an option for severe cases. This includes when the joint has serious damage, or when medical treatment fails to relieve pain and you have major loss of function. Surgery may involve arthroscopy, repair of the joint done through small incisions (cuts). If the joint damage cannot be repaired, you may need a joint replacement.    Supplements: Many over-the-counter nutrition supplements have been used for osteoarthritis treatment. Most lack good research data to support their effectiveness and safety. Among the most widely used are calcium, vitamin D and omega-3 fatty acids. To ensure safety and avoid drug interactions, consult your doctor or pharmacist before using any of these supplements. This is especially true when you are  combining these supplements with prescribed

## 2024-09-10 NOTE — PROGRESS NOTES
[facility], [address] [place] [address]      Consult     Danielle Mendosa Patient Status:  No patient class for patient encounter    1970 MRN [id number]   Location [facility], [address] [place] [address] Attending No att. providers found   Hosp Day # 0 PCP Kassidy Hughes MD     Referring Provider: PCP    Reason for Consultation: CANDELARIA+    Subjective:    Danielle Mendosa is a 53 year old female with joint pain and +CANDELARIA    Has chronic back pain and radiation lumbar spondylitis (tried cortisone injections)    Neck injections worked out (pain is gone) and back injection was done (had lumbar injection) 2023 with improvement only a few days and sent to a neurosurgeon (who told him)     Then referred to Dr. Hsu (chronic pain management) in [place]    Is considering putting on hydromorphone and muscle relaxant (but did testing to specify the pain regimen)    Phone visit on  (and then) person 2024    So plan is weight loss and surgery; trying to get weight loss drugs approved at this time     Has chronic knee pain and stiffness (can move but stairs is okay) slowly     Occasional pain in hands X rays of the hands revealed (changes of erosive OA a few years ago) did see hand specialist Dr Edmonds who did hand x rays left hand showing OA and injection of pip joints and and several other digits with some improvement for a few months.     Has has hand pain stiffness some swelling; tried hand PT with minimal improvement.     Left hand worse that other. 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     On and off  knee pain; low back; hip pain (started gabapentin 100mg bid for at least 1 year)     No titration up with gabapentin (on methocarbamol) 4 times  day ; no norco tramadol    Denies any history of DVT PE TIA CVA seizures hx of  migraines + headaches (now rare; rarely uses butalbatol )maybe once a year     States no shortness of breath ,chest pain  ,fevers ,chills    States no urinary or bowel symptoms; (constipation more than diarrhea) no blood in stool; has GERD (foot triggers it)    Started in 2020 (did a lot of tomatoes and triggered GERD)     States no headaches jaw pain, vision changes    States no history of pericardial, pleural effusions    States no significant dry eyes or dry mouth (more dry no cavities; night sweats, or LAD)     States no history of uveitis iritis scleritis    States no history of Raynaud's or digital ulcerations    States no major weight changes; chronic weight gain (since 2020) 20 lbs     Her weight has been stable    States no history of photosensitive or malar rash.(Rosacea)     States no history of psoriasis     Denies any depression anxiety or insomnia (has maybe depression/anxiety) few years more withdrawn; cried easily; anxiety;     Kids have anxiety/depression; nonrestorastive sleep (VICENTE on cpap) compliant (since one year) overall improvement     Patient was seen as a new patient January 2024  Extensive autoimmune workup inflammatory markers normal  X-rays of the hands concerning for erosive osteoarthritis  MRI was proceeded of the hand showing and confirming changes of erosive osteoarthritis and not rheumatoid arthritis  Patient was started on Plaquenil as a trial she feels like it may have helped somewhat with slight less stiffness in her hands she would like to remain on treatment  Continues to have chronic pain of her knees her back and neck followed by Dr. Solano pain specialist on high doses of narcotics along with muscle relaxants  Also battling a possible diagnosis of Ménière's versus benign positional vertigo had seen ENT and neurology for this autoimmune neurological workup otherwise was unrevealing  She is also tried Margie-Hallpike maneuvers without benefit and therapy  We have reminded patient she needs to get her eye exam which she states was scheduled shortly to rule out and have a baseline eye exam for Plaquenil  to rule out toxicity etc.  Understands the risk of this although minimal  She states she has not seen an orthopedic for her knee osteoarthritis with x-ray showing moderate to severe arthritis of the knees she has failed steroid injection in the past.  We have discussed referring her to an orthopedic internally she may be candidate for Synvisc injections or knee replacement  She is open to this idea  Her meloxicam was stopped by another provider because of abnormal labs.    History/Other:        Past Medical History:  Past Medical History:    Allergic rhinitis    Anesthesia complication    needing more than usual w/ C Section to go under    Arthritis    Fingers, toes, lower back, knees    Back pain    Family hisyory of back issues    Back problem    spinal stenosis    Constipation    Too far back to remember    Diarrhea, unspecified    Dizziness    Chronic vertigo    Family history of coronary artery disease    Flatulence/gas pain/belching    Food intolerance    Too much milk upsets my stomach. Cheese does not affect it    GERD (gastroesophageal reflux disease)    Headache disorder    Past 2 months headaches almost daily per dr has to do with    Hearing impairment    hearing aid right ear    Hearing loss    Hearing aid in right ear    Hearing loss in right ear    Heartburn    Sporadic    Heavy menses    Had hysterectomy in 2008    High cholesterol    Been on atorvastatin and it is controlled    History of thyroglossal duct cyst    excised    IBS (irritable bowel syndrome)    Migraine    Migraines    past    VICENTE (obstructive sleep apnea)    AHI 41    Osteoarthritis    eval by dr Akers, [facility], 2010    Pain in joints    Fingers and knees    Problems with swallowing    Whenever i have fried foods especially    Skin blushing/flushing    Random times    Spitting up blood    when coughing up sputum in mornings I see blood sometimes    Sputum production    first thing in morning usually    Stool incontinence    Marta of the  time no issues.  Sometimes I barely make it to    Wears glasses    Wheezing    With exercize        Past Surgical History:   Past Surgical History:   Procedure Laterality Date          x3    Colonoscopy N/A 3/23/2021    Procedure: COLONOSCOPY;  Surgeon: Chao Gonzales MD;  Location:  ENDOSCOPY    Hysterectomy      benign    Jesús biopsy stereo nodule 1 site right (cpt=19081)  2015    fibroadenoma    Other      left achilles tendon repair early     Other surgical history      thyroglossal duct cyst    Total abdom hysterectomy  2008       Social History:  reports that she has never smoked. She has never used smokeless tobacco. She reports that she does not currently use alcohol. She reports that she does not use drugs.    Family History:   Family History   Problem Relation Age of Onset    Other (CAD) Father     Other (COPD) Father     Other (BLADDER CANCER) Father     Heart Attack Father          to     Hypertension Mother     Other (HTN) Mother     Other (OSTEOPEROSIS) Mother     Other (ALS) Mother     Uterine Cancer Mother         Age 22    Mental Disorder Son         Anxiety    Mental Disorder Son         Anxiety and depression    Mental Disorder Son         Just seen for anxiety and depression    Breast Cancer Maternal Grandmother        Allergies:   Allergies   Allergen Reactions    Sulfa Antibiotics RASH       Current Medications:  Current Outpatient Medications   Medication Sig Dispense Refill    spironolactone (ALDACTONE) 25 MG Oral Tab Take 1 tablet (25 mg total) by mouth 2 (two) times daily. 90 tablet 1    ondansetron (ZOFRAN) 4 mg tablet Take 1 tablet (4 mg total) by mouth every 8 (eight) hours as needed for Nausea. 20 tablet 2    meclizine 25 MG Oral Tab Take 1 tablet (25 mg total) by mouth 3 (three) times daily as needed. 180 tablet 0    HYDROmorphone 4 MG Oral Tab Take 1 tablet (4 mg total) by mouth every 6 (six) hours as needed.      Vitamin B-1 100 MG Oral Tab        Vitamin D, Cholecalciferol, 50 MCG (2000 UT) Oral Cap       Ascorbic Acid (VITAMIN C) 100 MG Oral Tab       Omega-3 1000 MG Oral Cap       Omeprazole 40 MG Oral Capsule Delayed Release Take 1 capsule (40 mg total) by mouth daily. 90 capsule 3    hydroxychloroquine 200 MG Oral Tab Take 1 tablet (200 mg total) by mouth 2 (two) times daily. 180 tablet 0    cyclobenzaprine 10 MG Oral Tab Take 1 tablet (10 mg total) by mouth 3 (three) times daily as needed.      Alpha-Lipoic Acid 200 MG Oral Tab Take 250 mg by mouth daily.      Butalbital-Acetaminophen (BUTALBITAL-APAP OR) Take by mouth as needed.      Taurine 1000 MG Oral Cap Take by mouth.      GLUTATHIONE OR Take 1,000 mg by mouth.      Magnesium 500 MG Oral Tab Take 2 tablets (1,000 mg total) by mouth.      ZINC + VITAMIN C MT Use as directed in the mouth or throat.      ATORVASTATIN 10 MG Oral Tab TAKE 1 TABLET BY MOUTH EVERY DAY AT NIGHT 90 tablet 3    fluticasone propionate 50 MCG/ACT Nasal Suspension 2 sprays by Nasal route daily. 16 mL 0    Fexofenadine HCl 180 MG Oral Tab Take 1 tablet (180 mg total) by mouth daily.      SPIRONOLACTONE 25 MG Oral Tab TAKE 1 TABLET (25 MG TOTAL) BY MOUTH DAILY. (Patient not taking: Reported on 9/10/2024) 90 tablet 2    lidocaine 4 % External Patch Place 1 patch onto the skin daily. (Patient not taking: Reported on 9/10/2024) 10 patch 0      No current facility-administered medications for this visit.       (Not in a hospital admission)      Review of Systems:     Constitutional: Negative for chills, ,+fatigue, fever and unexpected weight change.    HENT: Negative for congestion, and mouth sores.    Eyes: Negative for photophobia, pain, redness and visual disturbance.    Respiratory: Negative for apnea, cough, chest tightness, shortness of breath, wheezing and stridor.    Cardiovascular: Negative for chest pain, palpitations and leg swelling.    Gastrointestinal: Negative for abdominal distention, abdominal pain, blood in stool,  +constipation, no diarrhea and nausea.    Endocrine: Negative.     Genitourinary: Negative for decreased urine volume, difficulty urinating, dyspareunia, dysuria, flank pain, and frequency.    Musculoskeletal: + arthralgias, gait problem and +joint swelling.    Skin: Negative for color change, pallor and rash. No raynauds or digital ulcerations no sclerodactly.    Allergic/Immunologic: Negative.    Neurological: Negative for dizziness, tremors, seizures, syncope, speech difficulty, weakness, light-headedness, numbness and headaches.    Hematological: Does not bruise/bleed easily.    Psychiatric/Behavioral: Negative for confusion, decreased concentration, hallucinations, self-injury, +sleep disturbance and NO suicidal ideas or ?depression.    Objective:   [unfilled]  Vitals:    09/10/24 1035   BP: 122/62   Pulse: 80   Resp: 16   Temp: 97.8 °F (36.6 °C)          Constitutional: is oriented to person, place, and time. Appears well-developed and well-nourished. No distress.    HEENT: Normocephalic; EOMI; no jvd; no LAD; no oral or nasal ulcers.     Eyes: Conjunctivae and EOM are normal. Pupils are equal, round, and reactive to light.     Neck: Normal range of motion. No thyromegaly present.    Cardiovascular: RRR, no murmurs.    Lungs: Clear, Bilateral air entry, no wheezes.    Abdominal: Soft.    Musculoskeletal:         Joint Exam 09/10/2024        Right  Left   Sternoclavicular   Tender   Tender   Thoracic Spine   Tender      Lumbar Spine   Tender      Sacroiliac   Tender   Tender   Hip   Tender   Tender   Knee   Tender   Tender        Swollen: 0      Tender: 10          Right shoulder: Exhibits normal range of motion on abduction and internal rotation, no tenderness, no bony tenderness, no deformity, no laceration, no pain and no spasm.        Left shoulder: Exhibits normal range of motion on abduction and internal and external rotation.  no tenderness, no bony tenderness, no swelling, no effusion, no  deformity, no pain, no spasm and normal strength.        Right elbow:  Exhibits normal range of motion, no swelling, no effusion and no deformity. No tenderness found. No medial epicondyle, no lateral epicondyle and no olecranon process tenderness noted. There are no contractures or tophi or nodules.        Left elbow:  Normal range of motion, no swelling, no effusion and no deformity. No medial epicondyle, no lateral epicondyle and no olecranon process tenderness noted. There are no contractures or tophi or nodules.        Right wrist:  Exhibits normal range of motion, no tenderness, no bony tenderness, no swelling, no effusion and no crepitus. Flexion and extension intact w/o limitation.        Left wrist: Exhibits normal range of motion, no tenderness, no bony tenderness, no swelling, no effusion, no crepitus and no deformity. Flexion and extension intact without limitation.        Right hip: Exhibits normal range of motion, normal strength, no tenderness, no bony tenderness, no swelling and no crepitus.        Right hand: No synovitis of MCP, or DIP joints; there are extensive scattered Bouchards or Heberden nodules noted;  strength: 95%.  Severe squaring first CMC joint; ulnar deviation first through fourth MCP joint flexion deformities at multiple DIP joints        Left hand: No synovitis of MCP, or DIP joints; there are extensive scattered Bouchards or Heberden nodules noted;  strength: 95%.  Severe squaring first CMC joint: Ulnar deviation first through fourth MCP joints; flexion deformities at the DIP joints        Left hip: Exhibits normal range of motion, normal strength, no tenderness, no bony tenderness, No swelling and no crepitus.        Right knee: Exhibits normal range of motion, no swelling, no effusion, no ecchymosis, no deformity and no erythema. No tenderness found. No medial joint line, no lateral joint line, no MCL and no LCL tenderness noted. mod crepitation on flexion of knee and  extension normal.  Noted quad atrophy        Left knee:  Exhibits normal range of motion, no swelling, no effusion, no ecchymosis and no erythema. No tenderness found. No medial joint line, no lateral joint line and no patellar tendon tenderness noted. mod crepitation on flexion of the knee. Extension intact and normal.  Noted quad atrophy        Right ankle: No swelling, no deformity. No tenderness. Dorsiflexion and plantar flexion intact without limitation in range of motion.        Left ankle: Exhibits no swelling. No tenderness. No lateral malleolus and no medial malleolus tenderness found. Achilles tendon normal. Achilles tendon exhibits no pain, no defect and normal Santos's test results.  Dorsiflexion and plantar flexion intact without limitation in range of motion.        Cervical back: Exhibits normal range of motion, no tenderness, no bony tenderness, no swelling, + pain and+ spasm.        Thoracic back: Exhibits normal range of motion, no tenderness, no bony tenderness and + spasm.        Lumbar back:  Exhibits normal range of motion, no tenderness, no bony tenderness, no pain and + spasm.  Patient is able to touch fingers to floor        Right foot: normal. There is normal range of motion, no tenderness, no bony tenderness, no crepitus and no laceration. There is no synovitis or tenderness of the MTP joints to palpation.  Significant bony enlargement MTP joint        Left foot: normal. There is normal range of motion, no tenderness, no bony tenderness and no crepitus. There is no synovitis or tenderness of the MTP joints to palpation.  Significant bony enlargement MTP joints    Lymphadenopathy: No submental, no submandibular, and no occipital adenopathy present, has no cervical adenopathy or axillary lympadenopathy.    Neurological: Alert and oriented. No focal motor or sensory abnormalities. Strength is 5/5 Upper Extremities/Lower Extremities proximally and distally.    Skin: Skin is warm, dry and  intact.    Psychiatric: Normal behavior.    Results:    Labs:      Lab Results   Component Value Date    WBC 5.5 04/16/2024    RBC 4.77 04/16/2024    HGB 12.7 04/16/2024    HCT 40.2 04/16/2024    MCV 84.3 04/16/2024    MCH 26.6 04/16/2024    MCHC 31.6 04/16/2024    RDW 14.1 04/16/2024    .0 04/16/2024       No components found for: \"RELY\", \"NMET\", \"MYEL\", \"PROMY\", \"MARIA LUZ\", \"ABSNEUTS\", \"ABSBANDS\", \"ABMM\", \"ABMY\", \"ABPM\", \"ABBL\"      Lab Results   Component Value Date     04/16/2024    K 4.3 04/16/2024    CO2 24.0 04/16/2024    BUN 15 04/16/2024    GFR 91 08/03/2017    ALB 4.5 08/07/2024    AST 45 (H) 08/07/2024    ALT 44 08/07/2024          No components found for: \"ESRWESTERGRN\"       Lab Results   Component Value Date    CRP <0.29 07/02/2024         No results found for: \"COLOR\", \"CLARITY\", \"UROBILINOGEN\", \"YEAST\"  @LABRCNTIP(RF,B12)@      [unfilled]    Imaging:  No results found.    atting of this note might be different from the original.   PROCEDURE: XR LUMBAR SPINE SERIES 4 OR MORE VWS     HISTORY: Left lower back pain     FINDINGS: 5 images are submitted. There is slight curvature of the spine convex to the left centered near the thoracic lumbar junction. No fracture or destructive lesion is seen. There is mild L5-S1 disc space narrowing and minimal L4-5 narrowing. Some anterior spurring is seen in the upper to mid lumbar region with facet degenerative changes in the mid to lower lumbar spine. Minimal left greater than right SI joint spurring is seen.     IMPRESSION:   1. Mild degenerative changes as above most prominent at L5-S1 where there is some disc space narrowing and facet change.   2. Slight curvature of the spine convex to the left centered in the thoracic lumbar junction.   3. Low-grade SI joint spurring.     EXAM: XR CERVICAL SPINE SERIES 4 OR 5 VWS    CLINICAL HISTORY: Cervical radiculopathy.  No injury or surgery to neck.  A clip with pain on right side of neck that travels to fingers  on right hand.  Pain for 10 weeks.    COMPARISON: None.    FINDINGS: 5 views of the cervical spine and lateral flexion and extension views are provided.    No definite acute fracture or traumatic malalignment.    Straightening of the cervical spine may be positional secondary to muscle spasm.    No significant prevertebral soft tissue swelling.    Moderate intervertebral disc space narrowing at C5-6 and mild disc space narrowing at C4-5.  Small marginal osteophytes noted.  The rest of the intervertebral disc spaces are maintained. Mild multilevel osteoarthritis of the facet joints and uncovertebral joints seen.  Evaluation of the neuroforamina is limited due to suboptimal positioning.    On the lateral flexion and extension views, the cervical spine is visualized to C6 level.  Of the visualized cervical spine, no definite abnormal motion seen.      IMPRESSION:  1.  No evidence of acute cervical spine fracture or traumatic malalignment.  2.  Straightening of the cervical spine may be positional or secondary to muscle spasm.  3.  Mild degenerative disease of the cervical spine.    FINAL REPORT  Attending Radiologist:  Jo Guardado MD  Date Signed Off:  02/28/2023 14:01  Procedure Note    Jo Guardado MD - 02/28/2023  Formatting of this note might be different from the original.  EXAM: XR CERVICAL SPINE SERIES 4 OR 5 VWS    CLINICAL HISTORY: Cervical radiculopathy.  No injury or surgery to neck.  A clip with pain on right side of neck that travels to fingers on right hand.  Pain for 10 weeks.    COMPARISON: None.    FINDINGS: 5 views of the cervical spine and lateral flexion and extension views are provided.    No definite acute fracture or traumatic malalignment.    Straightening of the cervical spine may be positional secondary to muscle spasm.    No significant prevertebral soft tissue swelling.    Moderate intervertebral disc space narrowing at C5-6 and mild disc space narrowing at C4-5.  Small marginal  osteophytes noted.  The rest of the intervertebral disc spaces are maintained. Mild multilevel osteoarthritis of the facet joints and uncovertebral joints seen.  Evaluation of the neuroforamina is limited due to suboptimal positioning.    On the lateral flexion and extension views, the cervical spine is visualized to C6 level.  Of the visualized cervical spine, no definite abnormal motion seen.      IMPRESSION:  1.  No evidence of acute cervical spine fracture or traumatic malalignment.  2.  Straightening of the cervical spine may be positional or secondary to muscle spasm.  3.  Mild degenerative disease of the cervical spine.    Venus Javed MD - 09/29/2021   Formatting of this note might be different from the original.   PROCEDURE:  XR FOOT, COMPLETE (MIN 3 VIEWS), LEFT (CPT=73630)     TECHNIQUE:  AP, oblique, and lateral views were obtained.     COMPARISON:  None.     INDICATIONS:  M79.672 Pain in left foot     PATIENT STATED HISTORY: (As transcribed by Technologist)  Distal metatarsal pain.     =====   CONCLUSION: Joint space narrowing 1st through 5th IP joints with mild hypertrophic changes.     No evidence for acute fracture.  No dislocation.     There is calcaneal plantar spurring.     PROCEDURE:  XR HAND (MIN 3 VIEWS), LEFT (CPT=73130)    TECHNIQUE:  Three views were obtained.    COMPARISON:  None.    INDICATIONS:  S69.92XA Unspecified injury of left wrist, hand and finger(s), initial encounter    PATIENT STATED HISTORY: (As transcribed by Technologist)  Patient states she rolled her hand under this mornong. Pain with gripping objects.        FINDINGS:  No fracture or dislocation.  Marked loss of distal interphalangeal joint spaces is noted.   marked loss of 5th digit proximal interphalangeal joint space.    Questionable marginal erosions in the distal interphalangeal joints are noted.    IMPRESSION:  Findings are suggestive of inflammatory arthropathy.      Dictated by: Nima Puga MD on  8/08/2018 at 14:51      Approved by: Nima Puga MD          Procedure Note    Nima Puga MD - 08/08/2018  Formatting of this note might be different from the original.  =====  PROCEDURE:  XR HAND (MIN 3 VIEWS), LEFT (CPT=73130)    TECHNIQUE:  Three views were obtained.    COMPARISON:  None.    INDICATIONS:  S69.92XA Unspecified injury of left wrist, hand and finger(s), initial encounter    PATIENT STATED HISTORY: (As transcribed by Technologist)  Patient states she rolled her hand under this mornong. Pain with gripping objects.      FINDINGS:  No fracture or dislocation.  Marked loss of distal interphalangeal joint spaces is noted.   marked loss of 5th digit proximal interphalangeal joint space.    Questionable marginal erosions in the distal interphalangeal joints are noted.    IMPRESSION:  Findings are suggestive of inflammatory arthropathy.      Dictated by: Nima Puga MD on 8/08/2018 at 14:51    MPRESSION:                                                              1.  Small effusion.                                                                                                                              2.  Moderate medial degenerative disease.                                                                                                        3.  Evidence of old Osgood-Schlatter disease.                                                                                                                                                                                                               Frank Hall M.D.            NISA/ceg                           RADIOLOGY                              D:  05-  15:42                                                    T:  05-  15:45                                                                                                                            RAD CHB:                                                                 Narrative      Assessment & Plan:      53-year-old woman comes in for further evaluation for positive CANDELARIA 1-1 60    History of chronic back and neck pain with cervical disc disease lumbar stenosis followed by pain management  Moderate to severe arthritis bilateral knees  Suspect myofascial pain syndrome with nonrestorative sleep fatigue neuropathy possible anxiety mild depression and tender points  Suspect erosive osteoarthritis would would need to rule out erosive seronegative RA  Morbid obesity  Positive CANDELARIA without clear history suggestive of inflammatory arthritis other than her hand deformities    Significant deformities of the hands more concerning for erosive arthritis   Extensive autoimmune workup normal  Rheumatoid factor CCP ESR CRP normal  X-ray of the hands concerns for erosive osteoarthritis  MRI of the hand also reveals erosive osteoarthritis without concerns of seronegative rheumatoid arthritis  Autoimmune workup otherwise normal   Patient is on hydroxychloroquine trial for 400 mg for the last 4 months with some improvement in stiffness she would like to remain on treatment   Advised her to get an updated eye exam to have baseline for eye toxicity   previous hepatitis serologies negative    I agree with pain management follow-up    Gabapentin 100 mg twice daily she should be on much higher doses through pain management she is working with them for pain management and has an appointment shortly to consider agent such as hydromorphone and muscle relaxant.  I have advised her to try to avoid narcotics and consider up titration of gabapentin through them instead    She has minimal response to injections consider neurosurgical evaluation which has stated that she needs to lose weight to consider surgery she is working on weight loss medication to get approved shortly through her PCP    She has done physical therapy for her back and physical therapy for the knees.  Will send her to orthopedic surgeon at  [place].  Moderate to severe arthritis of the knees noted she has failed steroid injections in the past.  She may be a candidate for knee replacement or Synvisc    Her meloxicam was stopped because of abnormal labs which have recently normalized she is on high doses of narcotics through pain specialist    MRI confirm erosive osteoarthritis.  Discussed with patient Plaquenil is only trial basis only for erosive osteoarthritis but also covers rheumatoid arthritis.  At this point in time I see no need for further immunotherapy and other than hydroxychloroquine.  Patient agrees with this plan    Change appointments to once a year to monitor for evolving connective tissue disease or inflammatory arthritis    Education and counseling provided to patient.  Instructed patient to call my office or seek medical attention immediately if symptoms worsen. Risks and side effects of medications and diagnosis discussed in detail and patient was given written information on new prescribed medications.    Return to clinic:  Return in about 1 year (around 9/10/2025).    Micki Buchanan MD  1/10/2024

## 2024-09-17 ENCOUNTER — PATIENT MESSAGE (OUTPATIENT)
Dept: ORTHOPEDICS CLINIC | Facility: CLINIC | Age: 54
End: 2024-09-17

## 2024-09-17 ENCOUNTER — TELEPHONE (OUTPATIENT)
Dept: ORTHOPEDICS CLINIC | Facility: CLINIC | Age: 54
End: 2024-09-17

## 2024-09-17 DIAGNOSIS — M25.561 PAIN IN BOTH KNEES, UNSPECIFIED CHRONICITY: Primary | ICD-10-CM

## 2024-09-17 DIAGNOSIS — M25.562 PAIN IN BOTH KNEES, UNSPECIFIED CHRONICITY: Primary | ICD-10-CM

## 2024-09-17 NOTE — TELEPHONE ENCOUNTER
Future Appointments   Date Time Provider Department Center   10/28/2024  8:40 AM [facility]1 [facility] [place]   10/28/2024  9:10 AM PF XR LL RM1 PF XRAY [place]   10/28/2024  9:40 AM Demond Bruce MD [facility] [address]

## 2024-09-17 NOTE — TELEPHONE ENCOUNTER
Patient is seeing Dr. Bruce for tasha knee pain. Please advise if imaging is needed.  Future Appointments   Date Time Provider Department Center   10/28/2024  9:40 AM Demond Bruce MD [facility] [address]

## 2024-09-28 ENCOUNTER — TELEPHONE (OUTPATIENT)
Dept: FAMILY MEDICINE CLINIC | Facility: CLINIC | Age: 54
End: 2024-09-28

## 2024-09-28 NOTE — TELEPHONE ENCOUNTER
Spoke with patient.  Requesting a prescription for paxlovid.  Patient started with symptoms yesterday, thought was allergies.  Symptoms slightly worse today and is starting to develop a cough.  Has congestion.  No shortness of breath, chest pain, or difficulty breathing.  Took Covid test today and is positive.    Patient states that she had covid twice in the past.  The time that she was given paxlovid, symptoms were much less and did not last as long.  She requesting a script.  Routing to Dr. Martel for request/recommendations.    Advised on supportive care measures including otc meds such as ibuprofen, tylenol, mucinex,  saline, saline washes, use of humidifier and steam therapy, vicks, rest, and fluids.

## 2024-09-28 NOTE — TELEPHONE ENCOUNTER
Patient notified of recommendations from Dr. Martel.  She will hold off on the paxlovid.  She will continue with supportive care measures and will follow up with Dr. Hughes if needed.  Advised that if she develops shortness of breath, chest pain or difficulty breathing, patient to go to ER.

## 2024-09-28 NOTE — TELEPHONE ENCOUNTER
Patient tested positive for covid 9/28/24, patient is requesting medication that she was prescribed in the past for it, [facility].

## 2024-09-28 NOTE — TELEPHONE ENCOUNTER
Typically not med I order due to lg # or side-effects of medicine and interactions w/ meds she is on  Studies currently show not effective  I would do what you had recommended  If Pt has been on + wants ok to order

## 2024-10-05 DIAGNOSIS — M15.4 EROSIVE OSTEOARTHRITIS OF BOTH HANDS: ICD-10-CM

## 2024-10-05 DIAGNOSIS — M79.18 MYOFASCIAL PAIN DYSFUNCTION SYNDROME: Primary | ICD-10-CM

## 2024-10-08 RX ORDER — HYDROXYCHLOROQUINE SULFATE 200 MG/1
200 TABLET, FILM COATED ORAL 2 TIMES DAILY
Qty: 180 TABLET | Refills: 0 | Status: SHIPPED | OUTPATIENT
Start: 2024-10-08

## 2024-10-08 NOTE — TELEPHONE ENCOUNTER
LOV: 09/10/2024    Future Appointments   Date Time Provider Department Center   10/28/2024  8:40 AM PF XR LL RM1 PF XRAY [place]   10/28/2024  9:10 AM PF XR LL RM1 PF XRAY [place]   10/28/2024  9:40 AM Demond Bruce MD [facility] [address]       LF: 04/24/2024    QTY: 180    Refills: 0     EYE EXAM: 11/13/2023

## 2024-10-28 ENCOUNTER — OFFICE VISIT (OUTPATIENT)
Facility: CLINIC | Age: 54
End: 2024-10-28
Payer: COMMERCIAL

## 2024-10-28 ENCOUNTER — HOSPITAL ENCOUNTER (OUTPATIENT)
Dept: GENERAL RADIOLOGY | Age: 54
Discharge: HOME | End: 2024-10-28
Attending: ORTHOPAEDIC SURGERY
Payer: COMMERCIAL

## 2024-10-28 VITALS — WEIGHT: 270.63 LBS | HEIGHT: 64 IN | BODY MASS INDEX: 46.2 KG/M2

## 2024-10-28 DIAGNOSIS — M25.561 PAIN IN BOTH KNEES, UNSPECIFIED CHRONICITY: ICD-10-CM

## 2024-10-28 DIAGNOSIS — E66.01 MORBID OBESITY (HCC): ICD-10-CM

## 2024-10-28 DIAGNOSIS — M25.562 PAIN IN BOTH KNEES, UNSPECIFIED CHRONICITY: ICD-10-CM

## 2024-10-28 DIAGNOSIS — M17.0 PRIMARY OSTEOARTHRITIS OF BOTH KNEES: Primary | ICD-10-CM

## 2024-10-28 PROCEDURE — 73564 X-RAY EXAM KNEE 4 OR MORE: CPT | Performed by: ORTHOPAEDIC SURGERY

## 2024-10-28 PROCEDURE — 99204 OFFICE O/P NEW MOD 45 MIN: CPT | Performed by: ORTHOPAEDIC SURGERY

## 2024-10-28 PROCEDURE — 3008F BODY MASS INDEX DOCD: CPT | Performed by: ORTHOPAEDIC SURGERY

## 2024-10-28 PROCEDURE — 20610 DRAIN/INJ JOINT/BURSA W/O US: CPT | Performed by: ORTHOPAEDIC SURGERY

## 2024-10-28 RX ORDER — TRIAMCINOLONE ACETONIDE 40 MG/ML
80 INJECTION, SUSPENSION INTRA-ARTICULAR; INTRAMUSCULAR ONCE
Status: COMPLETED | OUTPATIENT
Start: 2024-10-28 | End: 2024-10-28

## 2024-10-28 RX ADMIN — TRIAMCINOLONE ACETONIDE 80 MG: 40 INJECTION, SUSPENSION INTRA-ARTICULAR; INTRAMUSCULAR at 10:32:00

## 2024-10-28 NOTE — PROCEDURES
Risks and benefits of knee injection discussed with the patient, with risks including but not limited to pain and swelling at the injection site and/or within the knee joint, infection, elevation in blood pressure and/or glucose levels, facial flushing.  After informed consent, the patient's right and left knees were marked, locally anesthetized with skin refrigerant, prepped with topical antiseptic, and injected with a mixture of 1mL 40mg/mL Kenalog, 2mL 1% lidocaine and 2mL 0.5% marcaine through the inferolateral portal.  A band-aid was applied.  The patient tolerated the procedure well.    Demond Bruce MD, FAAOS  [facility]  Phone [phone]  Fax [phone]

## 2024-10-28 NOTE — H&P
[facility] New Patient Note    CC:   Chief Complaint   Patient presents with    Knee Pain     Bilateral knee pain        HPI: This 54 year old female presents today with complaints of bilateral knee pain.  She states symptoms have been ongoing for years.  Pain is in the front of the knees.  Symptoms worse with flexion weightbearing activities such as going up and down stairs.  She notes crunching and clicking in front of the knees.  She denies radiation of symptoms.  States that she did undergo a steroid injection in the knee 15 to 20 years ago that did not help very much.  She does not do any regular exercise or therapy.  She was taking meloxicam for years but recently stopped due to liver issues/lab abnormalities per her report, now taking Tylenol.  She reports 30 pound weight loss recently.    Past Medical History:    Allergic rhinitis    Anesthesia complication    needing more than usual w/ C Section to go under    Arthritis    Fingers, toes, lower back, knees    Back pain    Family hisyory of back issues    Back problem    spinal stenosis    Constipation    Too far back to remember    Diarrhea, unspecified    Dizziness    Chronic vertigo    Family history of coronary artery disease    Flatulence/gas pain/belching    Food intolerance    Too much milk upsets my stomach. Cheese does not affect it    GERD (gastroesophageal reflux disease)    Headache disorder    Past 2 months headaches almost daily per dr has to do with    Hearing impairment    hearing aid right ear    Hearing loss    Hearing aid in right ear    Hearing loss in right ear    Heartburn    Sporadic    Heavy menses    Had hysterectomy in 2008    High cholesterol    Been on atorvastatin and it is controlled    History of thyroglossal duct cyst    excised    IBS (irritable bowel syndrome)    Migraine    Migraines    past    VICENTE (obstructive sleep apnea)    AHI 41    Osteoarthritis    eval by dr Akers, [facility], 2010    Pain in joints    Fingers and  knees    Problems with swallowing    Whenever i have fried foods especially    Skin blushing/flushing    Random times    Spitting up blood    when coughing up sputum in mornings I see blood sometimes    Sputum production    first thing in morning usually    Stool incontinence    Marta of the time no issues.  Sometimes I barely make it to    Wears glasses    Wheezing    With exercize     Past Surgical History:   Procedure Laterality Date          x3    Colonoscopy N/A 3/23/2021    Procedure: COLONOSCOPY;  Surgeon: Chao Gonzales MD;  Location:  ENDOSCOPY    Hysterectomy      benign    Jesús biopsy stereo nodule 1 site right (cpt=19081)  2015    fibroadenoma    Other      left achilles tendon repair early     Other surgical history      thyroglossal duct cyst    Total abdom hysterectomy  2008     Current Outpatient Medications   Medication Sig Dispense Refill    HYDROXYCHLOROQUINE 200 MG Oral Tab TAKE 1 TABLET BY MOUTH TWICE A  tablet 0    ondansetron (ZOFRAN) 4 mg tablet Take 1 tablet (4 mg total) by mouth every 8 (eight) hours as needed for Nausea. 20 tablet 2    meclizine 25 MG Oral Tab Take 1 tablet (25 mg total) by mouth 3 (three) times daily as needed. 180 tablet 0    HYDROmorphone 4 MG Oral Tab Take 1 tablet (4 mg total) by mouth every 6 (six) hours as needed.      Vitamin B-1 100 MG Oral Tab       Vitamin D, Cholecalciferol, 50 MCG (2000 UT) Oral Cap       Ascorbic Acid (VITAMIN C) 100 MG Oral Tab       Omega-3 1000 MG Oral Cap       Omeprazole 40 MG Oral Capsule Delayed Release Take 1 capsule (40 mg total) by mouth daily. 90 capsule 3    cyclobenzaprine 10 MG Oral Tab Take 1 tablet (10 mg total) by mouth 3 (three) times daily as needed.      Alpha-Lipoic Acid 200 MG Oral Tab Take 250 mg by mouth daily.      Butalbital-Acetaminophen (BUTALBITAL-APAP OR) Take by mouth as needed.      Taurine 1000 MG Oral Cap Take by mouth.      GLUTATHIONE OR Take 1,000 mg by mouth.       Magnesium 500 MG Oral Tab Take 2 tablets (1,000 mg total) by mouth.      ZINC + VITAMIN C MT Use as directed in the mouth or throat.      SPIRONOLACTONE 25 MG Oral Tab TAKE 1 TABLET (25 MG TOTAL) BY MOUTH DAILY. 90 tablet 2    ATORVASTATIN 10 MG Oral Tab TAKE 1 TABLET BY MOUTH EVERY DAY AT NIGHT 90 tablet 3    fluticasone propionate 50 MCG/ACT Nasal Suspension 2 sprays by Nasal route daily. 16 mL 0    lidocaine 4 % External Patch Place 1 patch onto the skin daily. 10 patch 0    Fexofenadine HCl 180 MG Oral Tab Take 1 tablet (180 mg total) by mouth daily.      spironolactone (ALDACTONE) 25 MG Oral Tab Take 1 tablet (25 mg total) by mouth 2 (two) times daily. 90 tablet 1     Allergies[1]  Family History   Problem Relation Age of Onset    Other (CAD) Father     Other (COPD) Father     Other (BLADDER CANCER) Father     Heart Attack Father         2005 to 2010    Hypertension Mother     Other (HTN) Mother     Other (OSTEOPEROSIS) Mother     Other (ALS) Mother     Uterine Cancer Mother         Age 22    Mental Disorder Son         Anxiety    Mental Disorder Son         Anxiety and depression    Mental Disorder Son         Just seen for anxiety and depression    Breast Cancer Maternal Grandmother      Social History     Occupational History    Not on file   Tobacco Use    Smoking status: Never    Smokeless tobacco: Never    Tobacco comments:     Updated 10/28/24   Vaping Use    Vaping status: Never Used   Substance and Sexual Activity    Alcohol use: Not Currently     Comment: occasional and rare    Drug use: No    Sexual activity: Not on file        ROS:  Detailed system review obtained and negative except as mentioned above      Physical Exam:    Ht 5' 4\" (1.626 m)   Wt 270 lb 9.6 oz (122.7 kg)   BMI 46.45 kg/m²   Constitutional: Awake, alert, no distress.   Psychological: Appropriate affect.  Respiratory: Unlabored breathing.  Bilateral lower extremity:  Inspection: skin intact, no lesions, no effusion  Palpation:  Tender to palpation about the lateral patella, reproduces pain complaint  Range of motion: Full active extension of both knees, flexion 1 10-1 20  Knee stable to varus valgus stress  Neuromuscular: 5 out of 5 quad strength, sensation intact  Vascular: Warm well-perfused      Imaging: X-rays of both the right and left knees personally viewed, independently interpreted and radiology report read.  AP and PA flexion views with possible joint space narrowing, definite sclerosis and osteophyte formation medial femoral condyle, Kellgren Chevy grade 2.  Leal view demonstrates definite lateral patellofemoral joint space narrowing with larger osteophyte formation, Kellgren Chevy grade 3.      Assessment/Plan:  Diagnoses and all orders for this visit:    Primary osteoarthritis of both knees    Morbid obesity (HCC)      Assessment: 54-year-old female with symptomatic radiographically moderate bilateral patellofemoral arthritis    Plan: I discussed the etiology, natural history, and management options for symptomatic knee osteoarthritis.  I discussed nonsurgical and surgical treatments, with nonsurgical treatments to include anti-inflammatory medications, injections, activity modification, weight loss, low impact exercise and possible therapy.    She expressed understanding.  She is working on weight loss and notes success with 30 pound weight loss recently.  Did emphasize the importance of this, both for optimization for surgery, as well as real noticed improvement with regards to knee pain due to the force across the patellofemoral joint.  She is unable to take NSAIDs right now.  We did discuss the possibility of steroid injection which she would like to proceed with.  This was performed for both knees today.  Advised this can be repeated as often as every 3 months as needed.  If she does not get sufficient relief then viscosupplementation may be an option to consider as well.    Demond Bruce MD, FAAOS  [place]  [facility]  Phone [phone]  Fax [phone]         [1]   Allergies  Allergen Reactions    Sulfa Antibiotics RASH

## 2024-11-11 ENCOUNTER — OFFICE VISIT (OUTPATIENT)
Dept: FAMILY MEDICINE CLINIC | Facility: CLINIC | Age: 54
End: 2024-11-11
Payer: COMMERCIAL

## 2024-11-11 ENCOUNTER — HOSPITAL ENCOUNTER (OUTPATIENT)
Dept: GENERAL RADIOLOGY | Age: 54
Discharge: HOME | End: 2024-11-11
Attending: INTERNAL MEDICINE
Payer: COMMERCIAL

## 2024-11-11 VITALS
WEIGHT: 273.25 LBS | HEART RATE: 77 BPM | SYSTOLIC BLOOD PRESSURE: 130 MMHG | DIASTOLIC BLOOD PRESSURE: 68 MMHG | BODY MASS INDEX: 47 KG/M2 | OXYGEN SATURATION: 96 % | RESPIRATION RATE: 22 BRPM | TEMPERATURE: 98 F

## 2024-11-11 DIAGNOSIS — Z23 NEED FOR VACCINATION: ICD-10-CM

## 2024-11-11 DIAGNOSIS — M79.604 PAIN OF RIGHT LOWER EXTREMITY: Primary | ICD-10-CM

## 2024-11-11 DIAGNOSIS — G89.29 CHRONIC FOOT PAIN, RIGHT: ICD-10-CM

## 2024-11-11 DIAGNOSIS — M79.671 CHRONIC FOOT PAIN, RIGHT: ICD-10-CM

## 2024-11-11 PROCEDURE — 3078F DIAST BP <80 MM HG: CPT | Performed by: INTERNAL MEDICINE

## 2024-11-11 PROCEDURE — 3075F SYST BP GE 130 - 139MM HG: CPT | Performed by: INTERNAL MEDICINE

## 2024-11-11 PROCEDURE — 73630 X-RAY EXAM OF FOOT: CPT | Performed by: INTERNAL MEDICINE

## 2024-11-11 PROCEDURE — 90656 IIV3 VACC NO PRSV 0.5 ML IM: CPT | Performed by: INTERNAL MEDICINE

## 2024-11-11 PROCEDURE — 90471 IMMUNIZATION ADMIN: CPT | Performed by: INTERNAL MEDICINE

## 2024-11-11 PROCEDURE — 99214 OFFICE O/P EST MOD 30 MIN: CPT | Performed by: INTERNAL MEDICINE

## 2024-11-11 NOTE — PROGRESS NOTES
Danielle Mendosa is a 54 year old female.  HPI:   Pt has a metal bar fall on her foot Sept 6th in the garage.  It bruised day 2 and it has been hurting intermittently. Taking tylenol arthritis like always, nothing new.   Current Outpatient Medications   Medication Sig Dispense Refill    HYDROXYCHLOROQUINE 200 MG Oral Tab TAKE 1 TABLET BY MOUTH TWICE A  tablet 0    ondansetron (ZOFRAN) 4 mg tablet Take 1 tablet (4 mg total) by mouth every 8 (eight) hours as needed for Nausea. 20 tablet 2    Vitamin B-1 100 MG Oral Tab       Vitamin D, Cholecalciferol, 50 MCG (2000 UT) Oral Cap       Omega-3 1000 MG Oral Cap       Omeprazole 40 MG Oral Capsule Delayed Release Take 1 capsule (40 mg total) by mouth daily. 90 capsule 3    Alpha-Lipoic Acid 200 MG Oral Tab Take 250 mg by mouth daily.      Butalbital-Acetaminophen (BUTALBITAL-APAP OR) Take by mouth as needed.      Taurine 1000 MG Oral Cap Take by mouth.      GLUTATHIONE OR Take 1,000 mg by mouth.      Magnesium 500 MG Oral Tab Take 2 tablets (1,000 mg total) by mouth.      SPIRONOLACTONE 25 MG Oral Tab TAKE 1 TABLET (25 MG TOTAL) BY MOUTH DAILY. 90 tablet 2    ATORVASTATIN 10 MG Oral Tab TAKE 1 TABLET BY MOUTH EVERY DAY AT NIGHT 90 tablet 3    fluticasone propionate 50 MCG/ACT Nasal Suspension 2 sprays by Nasal route daily. 16 mL 0    lidocaine 4 % External Patch Place 1 patch onto the skin daily. 10 patch 0    Fexofenadine HCl 180 MG Oral Tab Take 1 tablet (180 mg total) by mouth daily.      meclizine 25 MG Oral Tab Take 1 tablet (25 mg total) by mouth 3 (three) times daily as needed. (Patient not taking: Reported on 11/11/2024) 180 tablet 0    HYDROmorphone 4 MG Oral Tab Take 1 tablet (4 mg total) by mouth every 6 (six) hours as needed. (Patient not taking: Reported on 11/11/2024)      Ascorbic Acid (VITAMIN C) 100 MG Oral Tab       cyclobenzaprine 10 MG Oral Tab Take 1 tablet (10 mg total) by mouth 3 (three) times daily as needed. (Patient not taking: Reported on  11/11/2024)      ZINC + VITAMIN C MT Use as directed in the mouth or throat.        Past Medical History:    Allergic rhinitis    Anesthesia complication    needing more than usual w/ C Section to go under    Arthritis    Fingers, toes, lower back, knees    Back pain    Family hisyory of back issues    Back problem    spinal stenosis    Constipation    Too far back to remember    Diarrhea, unspecified    Dizziness    Chronic vertigo    Family history of coronary artery disease    Flatulence/gas pain/belching    Food intolerance    Too much milk upsets my stomach. Cheese does not affect it    GERD (gastroesophageal reflux disease)    Headache disorder    Past 2 months headaches almost daily per dr has to do with    Hearing impairment    hearing aid right ear    Hearing loss    Hearing aid in right ear    Hearing loss in right ear    Heartburn    Sporadic    Heavy menses    Had hysterectomy in 2008    High cholesterol    Been on atorvastatin and it is controlled    History of thyroglossal duct cyst    excised    IBS (irritable bowel syndrome)    Migraine    Migraines    past    VICENTE (obstructive sleep apnea)    AHI 41    Osteoarthritis    [place] [facility]    Pain in joints    Fingers and knees    Problems with swallowing    Whenever i have fried foods especially    Skin blushing/flushing    Random times    Spitting up blood    when coughing up sputum in mornings I see blood sometimes    Sputum production    first thing in morning usually    Stool incontinence    Marta of the time no issues.  Sometimes I barely make it to    Wears glasses    Wheezing    With exercize      Social History:  Social History     Socioeconomic History    Marital status:    Tobacco Use    Smoking status: Never    Smokeless tobacco: Never    Tobacco comments:     Updated 10/28/24   Vaping Use    Vaping status: Never Used   Substance and Sexual Activity    Alcohol use: Not Currently     Comment: occasional and rare    Drug  use: No     Social Drivers of Health      Received from [facility], [facility]    Housing Stability        REVIEW OF SYSTEMS:   GENERAL HEALTH: feels well otherwise  SKIN: denies any unusual skin lesions or rashes  RESPIRATORY: denies shortness of breath with exertion  CARDIOVASCULAR: denies chest pain on exertion  GI: denies abdominal pain and denies heartburn  NEURO: denies headaches    EXAM:   /68   Pulse 77   Temp 98 °F (36.7 °C) (Temporal)   Resp 22   Wt 273 lb 4 oz (123.9 kg)   SpO2 96%   BMI 46.90 kg/m²   GENERAL: well developed, well nourished,in no apparent distress  SKIN: no rashes,no suspicious lesions  HEENT: atraumatic, normocephalic,ears and throat are clear  NECK: supple,no adenopathy,no bruits  LUNGS: clear to auscultation  CARDIO: RRR without murmur  GI: good BS's,no masses, HSM or tenderness  EXTREMITIES: no cyanosis, clubbing or edema    ASSESSMENT AND PLAN:     Encounter Diagnoses   Name Primary?    Pain of right lower extremity Yes    Chronic foot pain, right    Post trauma. FOOT CONTUSION. Rest, ice, anti-inflammatories and gentle rotation.  If no improvement in 2 weeks return.  Xray results as follows.      No orders of the defined types were placed in this encounter.      Meds & Refills for this Visit:  Requested Prescriptions      No prescriptions requested or ordered in this encounter       Imaging & Consults:  XR FOOT, COMPLETE (MIN 3 VIEWS), RIGHT (CPT=73630)    Follow up as needed.     The patient indicates understanding of these issues and agrees to the plan.

## 2024-11-14 ENCOUNTER — APPOINTMENT (OUTPATIENT)
Dept: ENDOCRINOLOGY | Age: 54
End: 2024-11-14

## 2024-11-30 RX ORDER — ATORVASTATIN CALCIUM 10 MG/1
TABLET, FILM COATED ORAL
Qty: 90 TABLET | Refills: 0 | Status: SHIPPED | OUTPATIENT
Start: 2024-11-30

## 2025-01-04 DIAGNOSIS — M79.18 MYOFASCIAL PAIN DYSFUNCTION SYNDROME: ICD-10-CM

## 2025-01-04 DIAGNOSIS — M15.4 EROSIVE OSTEOARTHRITIS OF BOTH HANDS: Primary | ICD-10-CM

## 2025-01-06 RX ORDER — HYDROXYCHLOROQUINE SULFATE 200 MG/1
200 TABLET, FILM COATED ORAL 2 TIMES DAILY
Qty: 60 TABLET | Refills: 0 | Status: SHIPPED | OUTPATIENT
Start: 2025-01-06

## 2025-01-06 NOTE — TELEPHONE ENCOUNTER
Last office visit: 9/10/24    Next Rheum Apt:Visit date not found    Last fill: 10/8/24    Labs:   Lab Results   Component Value Date    CREATSERUM 0.56 04/16/2024    GFR 91 08/03/2017    ALKPHO 71 08/07/2024    AST 45 (H) 08/07/2024    ALT 44 08/07/2024    BILT 0.7 08/07/2024    TP 7.4 08/07/2024    ALB 4.5 08/07/2024       Lab Results   Component Value Date    WBC 5.5 04/16/2024    HGB 12.7 04/16/2024    .0 04/16/2024    NEPRELIM 2.78 04/16/2024    NEPERCENT 50.7 04/16/2024    LYPERCENT 37.7 04/16/2024    NE 2.78 04/16/2024    LYMABS 2.07 04/16/2024

## 2025-01-06 NOTE — TELEPHONE ENCOUNTER
Pt indicates that she had eye exam on approximately 12/15/24, and will have them fax over the report ASAP

## 2025-02-02 DIAGNOSIS — M79.18 MYOFASCIAL PAIN DYSFUNCTION SYNDROME: ICD-10-CM

## 2025-02-02 DIAGNOSIS — M15.4 EROSIVE OSTEOARTHRITIS OF BOTH HANDS: ICD-10-CM

## 2025-02-03 RX ORDER — HYDROXYCHLOROQUINE SULFATE 200 MG/1
200 TABLET, FILM COATED ORAL 2 TIMES DAILY
Qty: 60 TABLET | Refills: 0 | OUTPATIENT
Start: 2025-02-03

## 2025-02-03 NOTE — TELEPHONE ENCOUNTER
Spoke to patient and she states that she has had a Plaquenil toxicity eye exam done at the [facility] and she will call their office and see if she can have them send over the eye exam office note.

## 2025-02-03 NOTE — TELEPHONE ENCOUNTER
LOV: 09/10/2024    Future Appointments   Date Time Provider Department Center   2/5/2025 10:40 AM EDER WILLSON RM1 [place]       LF: 01/06/2025    QTY: 60    Refills: 0    EYE EXAM: NONE

## 2025-02-10 NOTE — TELEPHONE ENCOUNTER
It has not been scanned into the chart yet. Just the barcode was created for it. You had looked at it and signed off on it.

## 2025-02-11 ENCOUNTER — TELEPHONE (OUTPATIENT)
Facility: LOCATION | Age: 55
End: 2025-02-11

## 2025-02-11 ENCOUNTER — HOSPITAL ENCOUNTER (OUTPATIENT)
Dept: MAMMOGRAPHY | Age: 55
Discharge: HOME | End: 2025-02-11
Attending: INTERNAL MEDICINE
Payer: COMMERCIAL

## 2025-02-11 DIAGNOSIS — Z00.00 WELL ADULT EXAM: ICD-10-CM

## 2025-02-11 PROCEDURE — 77063 BREAST TOMOSYNTHESIS BI: CPT | Performed by: INTERNAL MEDICINE

## 2025-02-11 PROCEDURE — 77067 SCR MAMMO BI INCL CAD: CPT | Performed by: INTERNAL MEDICINE

## 2025-02-11 RX ORDER — SPIRONOLACTONE 25 MG/1
25 TABLET ORAL 2 TIMES DAILY
Qty: 180 TABLET | Refills: 0 | OUTPATIENT
Start: 2025-02-11

## 2025-02-11 RX ORDER — SPIRONOLACTONE 25 MG/1
25 TABLET ORAL 2 TIMES DAILY
Qty: 90 TABLET | Refills: 1 | Status: SHIPPED | OUTPATIENT
Start: 2025-02-11

## 2025-02-11 RX ORDER — SPIRONOLACTONE 25 MG/1
25 TABLET ORAL DAILY
Qty: 90 TABLET | Refills: 0 | Status: SHIPPED | OUTPATIENT
Start: 2025-02-11

## 2025-02-11 NOTE — TELEPHONE ENCOUNTER
Patient have exacerbation of vertigo going to increase the Aldactone to twice daily and she will follow-up with an appointment this was ordered to her pharmacy

## 2025-02-11 NOTE — TELEPHONE ENCOUNTER
Pt has been having episodes of vertigo. Last office visit was August of 2024 stated, \"Aldactone 25 mg increasing to twice daily until vertigo resolves itself\". Pt is wondering if she should do that again until appointment in March? Pt is also wondering if this medication can be prescribed by ENT or by PCP ? Please advise.

## 2025-02-11 NOTE — TELEPHONE ENCOUNTER
OV 11/11/24  LABS 04/16/24 COMP    REFILL 03/22/24 #90 +2 RF    Future Appointments   Date Time Provider Department Center   3/28/2025 10:15 AM [facility]NA [facility] [id number]   3/28/2025 10:30 AM Per Ludwig MD [facility] [id number]     BP Readings from Last 3 Encounters:   11/11/24 130/68   09/10/24 122/62   08/20/24 138/80

## 2025-02-27 RX ORDER — ATORVASTATIN CALCIUM 10 MG/1
TABLET, FILM COATED ORAL
Qty: 90 TABLET | Refills: 0 | Status: SHIPPED | OUTPATIENT
Start: 2025-02-27

## 2025-02-27 NOTE — TELEPHONE ENCOUNTER
Last refill: 11/30/24  qtY: 90  W/ 0 refills  Last ov: 11/11/24    Requested Prescriptions     Pending Prescriptions Disp Refills    ATORVASTATIN 10 MG Oral Tab [Pharmacy Med Name: ATORVASTATIN 10 MG TABLET] 90 tablet 0     Sig: TAKE 1 TABLET BY MOUTH EVERY DAY AT NIGHT     Future Appointments   Date Time Provider Department Center   3/28/2025 10:15 AM [facility] AUDIO [facility] [id number]   3/28/2025 10:30 AM Per Ludwig MD [facility] [id number]

## 2025-03-28 ENCOUNTER — OFFICE VISIT (OUTPATIENT)
Facility: LOCATION | Age: 55
End: 2025-03-28
Payer: COMMERCIAL

## 2025-03-28 DIAGNOSIS — R42 VERTIGO: ICD-10-CM

## 2025-03-28 DIAGNOSIS — H90.71 MIXED CONDUCTIVE AND SENSORINEURAL HEARING LOSS OF RIGHT EAR WITH UNRESTRICTED HEARING OF LEFT EAR: Primary | ICD-10-CM

## 2025-03-28 DIAGNOSIS — H81.01 MENIERE'S DISEASE OF RIGHT EAR: Primary | ICD-10-CM

## 2025-03-28 DIAGNOSIS — H93.12 TINNITUS OF LEFT EAR: ICD-10-CM

## 2025-03-28 PROCEDURE — 99214 OFFICE O/P EST MOD 30 MIN: CPT | Performed by: OTOLARYNGOLOGY

## 2025-03-28 PROCEDURE — 92553 AUDIOMETRY AIR & BONE: CPT | Performed by: AUDIOLOGIST

## 2025-03-28 PROCEDURE — 92567 TYMPANOMETRY: CPT | Performed by: AUDIOLOGIST

## 2025-03-28 RX ORDER — DIAZEPAM 2 MG/1
2 TABLET ORAL EVERY 8 HOURS PRN
Qty: 60 TABLET | Refills: 2 | Status: SHIPPED | OUTPATIENT
Start: 2025-03-28

## 2025-03-28 NOTE — PROGRESS NOTES
Danielle Mendosa was seen for an audiometric evaluation and tympanogram today. Referred back to physician.    Rekha Bell MS, CCC-A

## 2025-03-28 NOTE — PROGRESS NOTES
Danielle Mendosa is a 54 year old female. No chief complaint on file.    HPI:   54-year-old white female she was having episodic vertigo pressure sensation said longstanding hearing loss in her right ear I was treating her for Ménière's and she had dramatic initial improvement on diuretic.  Since that time she is improved but not back to completely normal.  She has been taking 1 a day Dyazide and 25 mg meclizine.  Current Outpatient Medications   Medication Sig Dispense Refill    ATORVASTATIN 10 MG Oral Tab TAKE 1 TABLET BY MOUTH EVERY DAY AT NIGHT 90 tablet 0    spironolactone 25 MG Oral Tab Take 1 tablet (25 mg total) by mouth daily. 90 tablet 0    spironolactone (ALDACTONE) 25 MG Oral Tab Take 1 tablet (25 mg total) by mouth 2 (two) times daily. 90 tablet 1    hydroxychloroquine 200 MG Oral Tab Take 1 tablet (200 mg total) by mouth 2 (two) times daily. 60 tablet 0    ondansetron (ZOFRAN) 4 mg tablet Take 1 tablet (4 mg total) by mouth every 8 (eight) hours as needed for Nausea. 20 tablet 2    meclizine 25 MG Oral Tab Take 1 tablet (25 mg total) by mouth 3 (three) times daily as needed. 180 tablet 0    HYDROmorphone 4 MG Oral Tab Take 1 tablet (4 mg total) by mouth every 6 (six) hours as needed.      Vitamin B-1 100 MG Oral Tab       Vitamin D, Cholecalciferol, 50 MCG (2000 UT) Oral Cap       Ascorbic Acid (VITAMIN C) 100 MG Oral Tab       Omega-3 1000 MG Oral Cap       Omeprazole 40 MG Oral Capsule Delayed Release Take 1 capsule (40 mg total) by mouth daily. 90 capsule 3    cyclobenzaprine 10 MG Oral Tab Take 1 tablet (10 mg total) by mouth 3 (three) times daily as needed.      Alpha-Lipoic Acid 200 MG Oral Tab Take 250 mg by mouth daily.      Butalbital-Acetaminophen (BUTALBITAL-APAP OR) Take by mouth as needed.      Taurine 1000 MG Oral Cap Take by mouth.      GLUTATHIONE OR Take 1,000 mg by mouth.      Magnesium 500 MG Oral Tab Take 2 tablets (1,000 mg total) by mouth.      ZINC + VITAMIN C MT Use as directed  in the mouth or throat.      fluticasone propionate 50 MCG/ACT Nasal Suspension 2 sprays by Nasal route daily. 16 mL 0    lidocaine 4 % External Patch Place 1 patch onto the skin daily. 10 patch 0    Fexofenadine HCl 180 MG Oral Tab Take 1 tablet (180 mg total) by mouth daily.        Past Medical History:    Allergic rhinitis    Anesthesia complication    needing more than usual w/ C Section to go under    Arthritis    Fingers, toes, lower back, knees    Back pain    Family hisyory of back issues    Back problem    spinal stenosis    Constipation    Too far back to remember    Diarrhea, unspecified    Dizziness    Chronic vertigo    Family history of coronary artery disease    Flatulence/gas pain/belching    Food intolerance    Too much milk upsets my stomach. Cheese does not affect it    GERD (gastroesophageal reflux disease)    Headache disorder    Past 2 months headaches almost daily per dr has to do with    Hearing impairment    hearing aid right ear    Hearing loss    Hearing aid in right ear    Hearing loss in right ear    Heartburn    Sporadic    Heavy menses    Had hysterectomy in 2008    High cholesterol    Been on atorvastatin and it is controlled    History of thyroglossal duct cyst    excised    IBS (irritable bowel syndrome)    Migraine    Migraines    past    VICENTE (obstructive sleep apnea)    AHI 41    Osteoarthritis    eval by dr Akers, [facility] [place]    Pain in joints    Fingers and knees    Problems with swallowing    Whenever i have fried foods especially    Skin blushing/flushing    Random times    Spitting up blood    when coughing up sputum in mornings I see blood sometimes    Sputum production    first thing in morning usually    Stool incontinence    Marta of the time no issues.  Sometimes I barely make it to    Wears glasses    Wheezing    With exercize      Social History:  Social History     Socioeconomic History    Marital status:    Tobacco Use    Smoking status: Never    Smokeless  tobacco: Never    Tobacco comments:     Updated 10/28/24   Vaping Use    Vaping status: Never Used   Substance and Sexual Activity    Alcohol use: Not Currently     Comment: occasional and rare    Drug use: No     Social Drivers of Health      [facility]    Housing Stability      Past Surgical History:   Procedure Laterality Date          x3    Colonoscopy N/A 3/23/2021    Procedure: COLONOSCOPY;  Surgeon: Chao Gonzales MD;  Location:  ENDOSCOPY    Hysterectomy      benign    Jesús biopsy stereo nodule 1 site right (cpt=19081)  2015    fibroadenoma    Other      left achilles tendon repair early     Other surgical history      thyroglossal duct cyst    Total abdom hysterectomy  2008         REVIEW OF SYSTEMS:   GENERAL HEALTH: feels well otherwise  GENERAL : denies fever, chills, sweats, weight loss, weight gain  SKIN: denies any unusual skin lesions or rashes  RESPIRATORY: denies shortness of breath with exertion  NEURO: denies headaches    EXAM:   There were no vitals taken for this visit.    System Pertinent findings Details   Constitutional  Overall appearance - Normal.   Head/Face  Facial features -- Normal. Skull - Normal.   Eyes  Pupils equal ,round ,react to light and accomidate   Ears  External Ear Right: Normal, Left: Normal. Canal - Right: Normal, Left: Normal. TM - Right: Normal left: Normal   Nose  External Nose, Normal, Septum -midline,Nasal Vault, clear. Turbinates - Right: Normal left: Normal   Mouth/Throat  Lips/teeth/gums - Normal. Tonsils -1+ oropharynx - Normal.   Neck Exam  Inspection - Normal. Palpation - Normal. Parotid gland - Normal. Thyroid gland -normal   Lymph Detail  Submental. Submandibular. Anterior cervical. Posterior cervical. Supraclavicular.   Audiogram shows no change    ASSESSMENT AND PLAN:   1. Meniere's disease of right ear  Adding Valium 2 mg twice daily to her diuretic follow-up in 4  to 6 weeks no audiogram necessary      The patient indicates understanding of these issues and agrees to the plan.      Per Ludwig MD  3/28/2025  10:46 AM

## 2025-05-14 RX ORDER — MECLIZINE HYDROCHLORIDE 25 MG/1
25 TABLET ORAL 3 TIMES DAILY PRN
Qty: 90 TABLET | Refills: 0 | Status: SHIPPED | OUTPATIENT
Start: 2025-05-14

## 2025-06-01 ENCOUNTER — PATIENT MESSAGE (OUTPATIENT)
Dept: RHEUMATOLOGY | Facility: CLINIC | Age: 55
End: 2025-06-01

## 2025-06-01 DIAGNOSIS — M15.4 EROSIVE OSTEOARTHRITIS OF BOTH HANDS: ICD-10-CM

## 2025-06-01 DIAGNOSIS — M79.18 MYOFASCIAL PAIN DYSFUNCTION SYNDROME: ICD-10-CM

## 2025-06-02 RX ORDER — HYDROXYCHLOROQUINE SULFATE 200 MG/1
200 TABLET, FILM COATED ORAL 2 TIMES DAILY
Qty: 180 TABLET | Refills: 1 | Status: SHIPPED | OUTPATIENT
Start: 2025-06-02

## 2025-06-02 NOTE — TELEPHONE ENCOUNTER
LOV: 09/10/2024    Future Appointments   Date Time Provider Department Center   6/3/2025  9:45 AM Per Ludwig MD [id number]   7/30/2025  8:30 AM Pamela De La Cruz APRN [facility] GI   8/6/2025 12:00 PM Micki Buchanan MD [id number] [address]       LF: 01/06/2025    QTY: 60    Refills: 0    EYE EXAM: 10/30/2024

## 2025-06-16 RX ORDER — MECLIZINE HYDROCHLORIDE 25 MG/1
25 TABLET ORAL 3 TIMES DAILY PRN
Qty: 90 TABLET | Refills: 0 | Status: SHIPPED | OUTPATIENT
Start: 2025-06-16

## 2025-06-16 NOTE — TELEPHONE ENCOUNTER
Requested Prescriptions     Pending Prescriptions Disp Refills    MECLIZINE 25 MG Oral Tab [Pharmacy Med Name: MECLIZINE 25 MG TABLET] 90 tablet 0     Sig: TAKE 1 TABLET BY MOUTH THREE TIMES A DAY AS NEEDED       FILLED- 5/14/25  LOV- 3/28/25    No f/u scheduled

## 2025-07-01 ENCOUNTER — OFFICE VISIT (OUTPATIENT)
Facility: LOCATION | Age: 55
End: 2025-07-01
Payer: COMMERCIAL

## 2025-07-01 DIAGNOSIS — H81.01 MENIERE'S DISEASE OF RIGHT EAR: Primary | ICD-10-CM

## 2025-07-01 PROCEDURE — 99213 OFFICE O/P EST LOW 20 MIN: CPT | Performed by: OTOLARYNGOLOGY

## 2025-07-01 NOTE — PROGRESS NOTES
Danielle Mendosa is a 54 year old female. No chief complaint on file.    HPI:   She has a history right-sided Ménière's disease.  She takes spironolactone.  She also uses meclizine or Valium as needed for attacks.  She has had some positional component.    REVIEW OF SYSTEMS:   GENERAL HEALTH: feels well otherwise  GENERAL : denies fever, chills, sweats, weight loss, weight gain  SKIN: denies any unusual skin lesions or rashes  RESPIRATORY: denies shortness of breath with exertion  NEURO: denies headaches    EXAM:   There were no vitals taken for this visit.    System Findings Details   Constitutional  Overall appearance - Normal.   Psychiatric  Orientation - Oriented to time, place, person & situation. Appropriate mood and affect.   Head/Face  Facial features -- Normal. Skull - Normal.   Eyes  Pupils equal ,round ,react to light and accomidate   Ears, Nose, Throat, Neck  Ears clear no fluid or infection   Neurological  Memory - Normal. Cranial nerves - Cranial nerves II through XII grossly intact.   Lymph Detail  Submental. Submandibular. Anterior cervical. Posterior cervical. Supraclavicular.     Latest Audiogram Result (Hz) Exam performed: 3/28/2025 10:13 AM Last edited by Rekha Bell MS, CCC-A on 3/28/2025 10:26 AM        125 250  1500 2000 3000 4000 6000 8000    Right air:           45    Left air:  5 0  5  5  10 15 15    Right air (masked):  70 70  65  65  70 65     Right mastoid bone (masked):   20  25  40  20         Reliability:  Good    Transducer:  Headphones    Technique:  Conventional Audiometry    Comments:            Latest Speech Audiometry  Last edited by Rekha Bell MS, CCC-A on 3/28/2025 10:26 AM      Comments: DNT                 Latest Acoustic Reflex  Last edited by Rekha Bell MS, CCC-A on 3/28/2025 10:26 AM      Probe/stimulus 500 Hz 1000 Hz 2000 Hz 4000 Hz BBN   Right/right (ipsilateral)             Right/left (contralateral)             Left/left (ipsilateral)              Left/right (contralateral)             Right decay             Left decay               Comments: DNT                  Latest Tympanogram Result       Probe Tone (Hz): Unknown Exam performed: 3/28/2025 10:13 AM Last edited by Rekha Bell MS, CCC-A on 3/28/2025 10:26 AM      Tympanograms  These were drawn by a user, not generated from device data      Right Ear Left Ear                     Right Ear Left Ear    Tympanogram type:      Canal volume (mL): 1.58 1.49    Peak pressure (daPa): -98 -98    Peak amplitude (mL): 0.44 0.86    Tympanogram width (daPa):        Comments:                    Latest Audiogram and Tympanogram Result Text  Last edited by Rekha Bell MS, CCC-A on 3/28/2025 10:26 AM      Study Result                 Narrative & Impression  Patient reports vertigo episodes.  She is noticing a \"zing\" type sound in the left ear.  She is wearing a hearing aid in her right ear.    Audiogram:  No significant changes bilaterally from previous audiogram 8/28/2024.    Tympanogram:  WNL bilaterally.    Back to MD.                 ASSESSMENT AND PLAN:   1. Meniere's disease of right ear  She will continue on diuretic.  She will continue on meclizine or Valium as needed for attacks.  She did have physical therapy a few years ago and she could have a repeat evaluation with physical therapy to see if there is anything they could do with the positional component.      The patient indicates understanding of these issues and agrees to the plan.    No follow-ups on file.    Alfa Angulo MD  7/1/2025  3:23 PM

## 2025-07-07 ENCOUNTER — TELEPHONE (OUTPATIENT)
Facility: LOCATION | Age: 55
End: 2025-07-07

## 2025-07-07 DIAGNOSIS — H81.01 MENIERE'S DISEASE OF RIGHT EAR: Primary | ICD-10-CM

## 2025-07-07 NOTE — TELEPHONE ENCOUNTER
Pt calling and is needing her referral for physical therapy for vestibular therapy through [facility]. Referral has been placed and per Pt wanting it sent via [employer] message.

## 2025-07-15 ENCOUNTER — MED REC SCAN ONLY (OUTPATIENT)
Facility: LOCATION | Age: 55
End: 2025-07-15

## 2025-07-16 RX ORDER — MECLIZINE HYDROCHLORIDE 25 MG/1
25 TABLET ORAL 3 TIMES DAILY PRN
Qty: 90 TABLET | Refills: 0 | Status: SHIPPED | OUTPATIENT
Start: 2025-07-16

## 2025-07-30 PROBLEM — M43.17 SPONDYLOLISTHESIS AT L5-S1 LEVEL: Status: ACTIVE | Noted: 2025-02-26

## 2025-07-30 PROBLEM — R42 VERTIGO: Status: ACTIVE | Noted: 2025-02-26

## 2025-07-30 PROBLEM — N83.209 OVARIAN CYST: Status: ACTIVE | Noted: 2025-02-25

## 2025-07-30 PROBLEM — H83.3X1 NOISE-INDUCED HEARING LOSS OF RIGHT EAR: Status: ACTIVE | Noted: 2025-02-26

## 2025-07-30 PROBLEM — G47.30 SLEEP APNEA WITH USE OF CONTINUOUS POSITIVE AIRWAY PRESSURE (CPAP): Status: ACTIVE | Noted: 2025-02-26

## 2025-08-06 ENCOUNTER — OFFICE VISIT (OUTPATIENT)
Dept: RHEUMATOLOGY | Facility: CLINIC | Age: 55
End: 2025-08-06

## 2025-08-06 VITALS
DIASTOLIC BLOOD PRESSURE: 60 MMHG | HEART RATE: 78 BPM | BODY MASS INDEX: 42.7 KG/M2 | WEIGHT: 244 LBS | SYSTOLIC BLOOD PRESSURE: 120 MMHG | RESPIRATION RATE: 16 BRPM | HEIGHT: 63.5 IN | OXYGEN SATURATION: 97 % | TEMPERATURE: 98 F

## 2025-08-06 DIAGNOSIS — M25.562 CHRONIC PAIN OF BOTH KNEES: ICD-10-CM

## 2025-08-06 DIAGNOSIS — E66.813 OBESITY, CLASS III, BMI 40-49.9 (MORBID OBESITY): Primary | ICD-10-CM

## 2025-08-06 DIAGNOSIS — G47.30 SLEEP APNEA WITH USE OF CONTINUOUS POSITIVE AIRWAY PRESSURE (CPAP): ICD-10-CM

## 2025-08-06 DIAGNOSIS — Z79.1 NSAID LONG-TERM USE: ICD-10-CM

## 2025-08-06 DIAGNOSIS — G89.29 CHRONIC PAIN OF BOTH KNEES: ICD-10-CM

## 2025-08-06 DIAGNOSIS — G43.001 MIGRAINE WITHOUT AURA AND WITH STATUS MIGRAINOSUS, NOT INTRACTABLE: ICD-10-CM

## 2025-08-06 DIAGNOSIS — M79.18 MYOFASCIAL PAIN DYSFUNCTION SYNDROME: ICD-10-CM

## 2025-08-06 DIAGNOSIS — R76.8 ANA POSITIVE: ICD-10-CM

## 2025-08-06 DIAGNOSIS — G89.4 CHRONIC PAIN SYNDROME: ICD-10-CM

## 2025-08-06 DIAGNOSIS — M15.4 EROSIVE OSTEOARTHRITIS OF BOTH HANDS: ICD-10-CM

## 2025-08-06 DIAGNOSIS — M15.0 PRIMARY OSTEOARTHRITIS INVOLVING MULTIPLE JOINTS: ICD-10-CM

## 2025-08-06 DIAGNOSIS — M25.561 CHRONIC PAIN OF BOTH KNEES: ICD-10-CM

## 2025-08-06 PROCEDURE — 3074F SYST BP LT 130 MM HG: CPT | Performed by: INTERNAL MEDICINE

## 2025-08-06 PROCEDURE — 99214 OFFICE O/P EST MOD 30 MIN: CPT | Performed by: INTERNAL MEDICINE

## 2025-08-06 PROCEDURE — 3078F DIAST BP <80 MM HG: CPT | Performed by: INTERNAL MEDICINE

## 2025-08-06 PROCEDURE — 3008F BODY MASS INDEX DOCD: CPT | Performed by: INTERNAL MEDICINE

## 2025-08-06 RX ORDER — HYDROXYCHLOROQUINE SULFATE 200 MG/1
200 TABLET, FILM COATED ORAL 2 TIMES DAILY
Qty: 60 TABLET | Refills: 8 | Status: SHIPPED | OUTPATIENT
Start: 2025-08-06

## 2025-08-06 RX ORDER — HYDROXYCHLOROQUINE SULFATE 200 MG/1
200 TABLET, FILM COATED ORAL 2 TIMES DAILY
Qty: 60 TABLET | Refills: 0 | Status: SHIPPED | OUTPATIENT
Start: 2025-08-06 | End: 2025-08-06

## 2025-08-12 RX ORDER — MECLIZINE HYDROCHLORIDE 25 MG/1
25 TABLET ORAL 3 TIMES DAILY PRN
Qty: 90 TABLET | Refills: 0 | Status: SHIPPED | OUTPATIENT
Start: 2025-08-12

## 2025-08-27 ENCOUNTER — MED REC SCAN ONLY (OUTPATIENT)
Facility: LOCATION | Age: 55
End: 2025-08-27

## (undated) DEVICE — INFLATION DEVICE DISP

## (undated) DEVICE — ENDOSCOPY PACK UPPER: Brand: MEDLINE INDUSTRIES, INC.

## (undated) DEVICE — Device: Brand: DEFENDO AIR/WATER/SUCTION AND BIOPSY VALVE

## (undated) DEVICE — 10FT COMBINED O2 DELIVERY/CO2 MONITORING. FILTER WITH MICROSTREAM TYPE LUER: Brand: DUAL ADULT NASAL CANNULA

## (undated) DEVICE — FORCEP BIOPSY RJ4 LG CAP W/ND

## (undated) DEVICE — 1200CC GUARDIAN II: Brand: GUARDIAN

## (undated) DEVICE — BIOGUARD CLEANING ADAPTER

## (undated) DEVICE — TRAP 4 CPTR CHMBR N EZ INLN

## (undated) DEVICE — BLOCK BITE MAXI 60FR

## (undated) DEVICE — ENDOSCOPY PACK - LOWER: Brand: MEDLINE INDUSTRIES, INC.

## (undated) DEVICE — HERCULES 3 STAGE BALLOON ESOPHAGEAL: Brand: HERCULES

## (undated) DEVICE — 3M™ RED DOT™ MONITORING ELECTRODE WITH FOAM TAPE AND STICKY GEL, 50/BAG, 20/CASE, 72/PLT 2570: Brand: RED DOT™

## (undated) DEVICE — SNARE CAPTIFLEX MICRO-OVL OLY

## (undated) DEVICE — FILTERLINE NASAL ADULT O2/CO2

## (undated) DEVICE — KIT ENDO ORCAPOD 160/180/190

## (undated) NOTE — LETTER
[address]  [phone]          Date: 6/1/2022      Patient Name: Stacie Amaro            To Whom it may concern: The above patient was [facility] for [facility] of a medical condition. This patient should be excused from attending work from 6/1/2022   through 6/5/2022. Patient symptoms are  consistent with COVID-19 with positive test Patient  considered infectious/contagious through 6/5/2022--but able to end quarantine 6/6/2022        The patient may return to work on 6/6/2022. Restrictions: none.     Sincerely,      Jimmy Nunez MD

## (undated) NOTE — LETTER
06/27/22          To Whom It May Concern,    Due to medical illness, please excuse Edelmira Ortiz from lifting over 5 pounds for next 2 weeks.      Sincerely,      Lisa Gomes D.O., FAAFP

## (undated) NOTE — LETTER
07/07/20          To Whom It May Concern,    Please excuse Marymahin Jane from lifting over 10 pounds for the next 2 weeks.     Sincerely,    Viktoria Charles D.O., FAAFP

## (undated) NOTE — LETTER
09/03/19          To Whom It May Concern,    On June 25, 2019, I referred Mario Chavez to physical therapy for treatment of her benign positional vertigo.     Sincerely,    Allison Tidwell D.O., FAAFP

## (undated) NOTE — MR AVS SNAPSHOT
[facility]  [address] [facility] [address]  [phone]               Thank you for choosing us for your health care visit with Lisa Holm DO.   We are glad to serve you and happy to provide you with this summ Referral Details     Referred By    Referred To    Sal Green DO   [address] [address]   Phone:  [phone]   Fax:  [phone]    Diagnoses:  Primary osteoarthritis of both hands   Order:  Physical Therapy Ex For medical emergencies, dial 911. Educational Information     Healthy Diet and Regular Exercise  [employer] for making healthy food choices  -   Enjoy your food, but eat less. Fully enjoy your food when eating.    Don’t

## (undated) NOTE — LETTER
Date & Time: 6/19/2022, 3:28 PM  Patient: Amina Good  Encounter Provider(s):    Vanessa Crystal       To Whom It May Concern:    Hilario Turner was seen and treated in our department on 6/19/2022. She can return to work with these limitations: no lifting or twisting. .    If you have any questions or concerns, please do not hesitate to call.        _____________________________  Physician/[facility] Signature

## (undated) NOTE — LETTER
Patient Name: Jeanna Quinones  YOB: 1970          MRN number:  [id number]  Date:  8/1/2019  Referring Physician:  Ashtyn Crawford    Discharge Summary  Initial Functional Outcome Score 64/100 Risk adjusted  Final Functional Outcome Score 74 Pt to report ability to perform supine<>sit without dizziness.  (6 visits)  8/1/19- GOAL  MET  Pt to report no c/o dizziness with positional changes x 18 days (6 visits)  8/1/19- goal NOT met    Vestibular goals for unilateral, bilateral or central deficits

## (undated) NOTE — LETTER
12/06/19        Law Mares  [address] [facility]      Dear Stacey Rater records indicate that you have outstanding lab work and or testing that was ordered for you and has not yet been completed:  Orders Placed This Encounter

## (undated) NOTE — MR AVS SNAPSHOT
[facility]  [address] [place] [id number]  [phone]               Thank you for choosing us for your health care visit with Bandar Cerda DO.   We are glad to serve you and happy to provide you with this summ discharge instructions in [employer] by going to Visits < Admission Summaries. If you've been to the Emergency Department or your doctor's office, you can view your past visit information in [employer] by going to Visits < Visit Summaries. [employer] questions?